# Patient Record
Sex: MALE | Race: WHITE | URBAN - METROPOLITAN AREA
[De-identification: names, ages, dates, MRNs, and addresses within clinical notes are randomized per-mention and may not be internally consistent; named-entity substitution may affect disease eponyms.]

---

## 2017-01-23 ENCOUNTER — APPOINTMENT (RX ONLY)
Dept: URBAN - METROPOLITAN AREA CLINIC 141 | Facility: CLINIC | Age: 72
Setting detail: DERMATOLOGY
End: 2017-01-23

## 2017-01-23 DIAGNOSIS — D18.0 HEMANGIOMA: ICD-10-CM

## 2017-01-23 DIAGNOSIS — Z85.820 PERSONAL HISTORY OF MALIGNANT MELANOMA OF SKIN: ICD-10-CM

## 2017-01-23 DIAGNOSIS — L21.8 OTHER SEBORRHEIC DERMATITIS: ICD-10-CM

## 2017-01-23 DIAGNOSIS — L81.4 OTHER MELANIN HYPERPIGMENTATION: ICD-10-CM

## 2017-01-23 DIAGNOSIS — D22 MELANOCYTIC NEVI: ICD-10-CM

## 2017-01-23 DIAGNOSIS — L82.1 OTHER SEBORRHEIC KERATOSIS: ICD-10-CM

## 2017-01-23 PROBLEM — L85.3 XEROSIS CUTIS: Status: ACTIVE | Noted: 2017-01-23

## 2017-01-23 PROBLEM — J44.9 CHRONIC OBSTRUCTIVE PULMONARY DISEASE, UNSPECIFIED: Status: ACTIVE | Noted: 2017-01-23

## 2017-01-23 PROBLEM — D18.01 HEMANGIOMA OF SKIN AND SUBCUTANEOUS TISSUE: Status: ACTIVE | Noted: 2017-01-23

## 2017-01-23 PROBLEM — I10 ESSENTIAL (PRIMARY) HYPERTENSION: Status: ACTIVE | Noted: 2017-01-23

## 2017-01-23 PROBLEM — L57.0 ACTINIC KERATOSIS: Status: ACTIVE | Noted: 2017-01-23

## 2017-01-23 PROBLEM — E78.5 HYPERLIPIDEMIA, UNSPECIFIED: Status: ACTIVE | Noted: 2017-01-23

## 2017-01-23 PROBLEM — D22.61 MELANOCYTIC NEVI OF RIGHT UPPER LIMB, INCLUDING SHOULDER: Status: ACTIVE | Noted: 2017-01-23

## 2017-01-23 PROCEDURE — ? COUNSELING

## 2017-01-23 PROCEDURE — ? PRESCRIPTION

## 2017-01-23 PROCEDURE — 99203 OFFICE O/P NEW LOW 30 MIN: CPT

## 2017-01-23 RX ORDER — KETOCONAZOLE 20.5 MG/ML
SHAMPOO, SUSPENSION TOPICAL
Qty: 1 | Refills: 5 | Status: ERX

## 2017-01-23 ASSESSMENT — LOCATION ZONE DERM
LOCATION ZONE: SCALP
LOCATION ZONE: ARM
LOCATION ZONE: FACE
LOCATION ZONE: TRUNK

## 2017-01-23 ASSESSMENT — LOCATION SIMPLE DESCRIPTION DERM
LOCATION SIMPLE: LEFT CLAVICULAR SKIN
LOCATION SIMPLE: RIGHT SHOULDER
LOCATION SIMPLE: RIGHT FOREARM
LOCATION SIMPLE: ABDOMEN
LOCATION SIMPLE: LEFT SCALP
LOCATION SIMPLE: LEFT FOREHEAD

## 2017-01-23 ASSESSMENT — LOCATION DETAILED DESCRIPTION DERM
LOCATION DETAILED: RIGHT POSTERIOR SHOULDER
LOCATION DETAILED: LEFT CLAVICULAR SKIN
LOCATION DETAILED: LEFT MEDIAL FRONTAL SCALP
LOCATION DETAILED: LEFT RIB CAGE
LOCATION DETAILED: RIGHT DISTAL DORSAL FOREARM
LOCATION DETAILED: LEFT SUPERIOR MEDIAL FOREHEAD

## 2017-01-23 NOTE — HPI: EVALUATION OF SKIN LESION(S)
How Severe Are Your Spot(S)?: mild
Have Your Spot(S) Been Treated In The Past?: has not been treated
Hpi Title: Evaluation of Skin Lesions
Location: right forearm
Year Removed: 2013

## 2017-05-01 ENCOUNTER — HOSPITAL ENCOUNTER (OUTPATIENT)
Dept: HOSPITAL 35 - PAIN | Age: 72
Discharge: HOME | End: 2017-05-01
Attending: ANESTHESIOLOGY
Payer: COMMERCIAL

## 2017-05-01 VITALS — WEIGHT: 215.6 LBS | HEIGHT: 64.02 IN | BODY MASS INDEX: 36.81 KG/M2

## 2017-05-01 VITALS — DIASTOLIC BLOOD PRESSURE: 59 MMHG | SYSTOLIC BLOOD PRESSURE: 115 MMHG

## 2017-05-01 DIAGNOSIS — H35.30: ICD-10-CM

## 2017-05-01 DIAGNOSIS — M47.896: ICD-10-CM

## 2017-05-01 DIAGNOSIS — M54.9: Primary | ICD-10-CM

## 2017-05-01 DIAGNOSIS — E11.9: ICD-10-CM

## 2017-05-01 DIAGNOSIS — Z87.311: ICD-10-CM

## 2017-05-01 DIAGNOSIS — J44.9: ICD-10-CM

## 2017-05-01 DIAGNOSIS — Z87.891: ICD-10-CM

## 2017-05-01 DIAGNOSIS — G89.29: ICD-10-CM

## 2017-05-01 DIAGNOSIS — M81.0: ICD-10-CM

## 2017-05-01 DIAGNOSIS — J45.909: ICD-10-CM

## 2017-05-25 ENCOUNTER — APPOINTMENT (RX ONLY)
Dept: URBAN - METROPOLITAN AREA CLINIC 141 | Facility: CLINIC | Age: 72
Setting detail: DERMATOLOGY
End: 2017-05-25

## 2017-05-25 DIAGNOSIS — L57.0 ACTINIC KERATOSIS: ICD-10-CM

## 2017-05-25 DIAGNOSIS — L82.1 OTHER SEBORRHEIC KERATOSIS: ICD-10-CM

## 2017-05-25 DIAGNOSIS — Z85.820 PERSONAL HISTORY OF MALIGNANT MELANOMA OF SKIN: ICD-10-CM

## 2017-05-25 PROCEDURE — 99213 OFFICE O/P EST LOW 20 MIN: CPT

## 2017-05-25 PROCEDURE — ? COUNSELING

## 2017-05-25 ASSESSMENT — LOCATION SIMPLE DESCRIPTION DERM
LOCATION SIMPLE: RIGHT FOREARM
LOCATION SIMPLE: LEFT EAR
LOCATION SIMPLE: LEFT FOREARM

## 2017-05-25 ASSESSMENT — LOCATION DETAILED DESCRIPTION DERM
LOCATION DETAILED: RIGHT DISTAL DORSAL FOREARM
LOCATION DETAILED: RIGHT PROXIMAL DORSAL FOREARM
LOCATION DETAILED: LEFT PROXIMAL DORSAL FOREARM
LOCATION DETAILED: LEFT SUPERIOR CRUS OF ANTIHELIX

## 2017-05-25 ASSESSMENT — LOCATION ZONE DERM
LOCATION ZONE: ARM
LOCATION ZONE: EAR

## 2017-05-25 NOTE — HPI: EVALUATION OF SKIN LESION(S)
How Severe Are Your Spot(S)?: mild
Have Your Spot(S) Been Treated In The Past?: has not been treated
Hpi Title: Evaluation of Skin Lesions
Location: right forearm
Year Removed: 2011

## 2017-06-13 ENCOUNTER — HOSPITAL ENCOUNTER (INPATIENT)
Dept: HOSPITAL 35 - ER | Age: 72
LOS: 5 days | Discharge: HOME HEALTH | DRG: 871 | End: 2017-06-18
Attending: FAMILY MEDICINE | Admitting: FAMILY MEDICINE
Payer: COMMERCIAL

## 2017-06-13 VITALS — BODY MASS INDEX: 37.63 KG/M2 | HEIGHT: 64.02 IN | WEIGHT: 220.4 LBS

## 2017-06-13 VITALS — SYSTOLIC BLOOD PRESSURE: 105 MMHG | DIASTOLIC BLOOD PRESSURE: 66 MMHG

## 2017-06-13 VITALS — DIASTOLIC BLOOD PRESSURE: 52 MMHG | SYSTOLIC BLOOD PRESSURE: 78 MMHG

## 2017-06-13 VITALS — DIASTOLIC BLOOD PRESSURE: 70 MMHG | SYSTOLIC BLOOD PRESSURE: 116 MMHG

## 2017-06-13 VITALS — DIASTOLIC BLOOD PRESSURE: 67 MMHG | SYSTOLIC BLOOD PRESSURE: 127 MMHG

## 2017-06-13 VITALS — DIASTOLIC BLOOD PRESSURE: 67 MMHG | SYSTOLIC BLOOD PRESSURE: 118 MMHG

## 2017-06-13 VITALS — SYSTOLIC BLOOD PRESSURE: 123 MMHG | DIASTOLIC BLOOD PRESSURE: 66 MMHG

## 2017-06-13 VITALS — DIASTOLIC BLOOD PRESSURE: 57 MMHG | SYSTOLIC BLOOD PRESSURE: 102 MMHG

## 2017-06-13 VITALS — DIASTOLIC BLOOD PRESSURE: 69 MMHG | SYSTOLIC BLOOD PRESSURE: 151 MMHG

## 2017-06-13 DIAGNOSIS — F32.9: ICD-10-CM

## 2017-06-13 DIAGNOSIS — J96.01: ICD-10-CM

## 2017-06-13 DIAGNOSIS — Z87.891: ICD-10-CM

## 2017-06-13 DIAGNOSIS — J44.0: ICD-10-CM

## 2017-06-13 DIAGNOSIS — Z98.52: ICD-10-CM

## 2017-06-13 DIAGNOSIS — M48.00: ICD-10-CM

## 2017-06-13 DIAGNOSIS — Z88.6: ICD-10-CM

## 2017-06-13 DIAGNOSIS — Z79.899: ICD-10-CM

## 2017-06-13 DIAGNOSIS — Z88.8: ICD-10-CM

## 2017-06-13 DIAGNOSIS — R65.21: ICD-10-CM

## 2017-06-13 DIAGNOSIS — I12.9: ICD-10-CM

## 2017-06-13 DIAGNOSIS — G89.29: ICD-10-CM

## 2017-06-13 DIAGNOSIS — Z93.3: ICD-10-CM

## 2017-06-13 DIAGNOSIS — A41.9: Primary | ICD-10-CM

## 2017-06-13 DIAGNOSIS — E78.5: ICD-10-CM

## 2017-06-13 DIAGNOSIS — E11.22: ICD-10-CM

## 2017-06-13 DIAGNOSIS — M81.0: ICD-10-CM

## 2017-06-13 DIAGNOSIS — N17.9: ICD-10-CM

## 2017-06-13 DIAGNOSIS — N18.9: ICD-10-CM

## 2017-06-13 DIAGNOSIS — J18.9: ICD-10-CM

## 2017-06-13 DIAGNOSIS — K57.90: ICD-10-CM

## 2017-06-13 DIAGNOSIS — Z87.442: ICD-10-CM

## 2017-06-13 DIAGNOSIS — J44.1: ICD-10-CM

## 2017-06-13 DIAGNOSIS — I25.10: ICD-10-CM

## 2017-06-13 DIAGNOSIS — Z88.1: ICD-10-CM

## 2017-06-13 DIAGNOSIS — Z96.659: ICD-10-CM

## 2017-06-13 DIAGNOSIS — M19.90: ICD-10-CM

## 2017-06-13 LAB
ABG SAMPLE TYPE: (no result)
ALBUMIN SERPL-MCNC: 3.5 G/DL (ref 3.4–5)
ALP SERPL-CCNC: 88 U/L (ref 46–116)
ALT SERPL-CCNC: 30 U/L (ref 30–65)
ANION GAP SERPL CALC-SCNC: 11 MMOL/L (ref 7–16)
AST SERPL-CCNC: 13 U/L (ref 15–37)
BASOPHILS NFR BLD AUTO: 0.7 % (ref 0–2)
BE(VIVO): -1.3 MMOL/L
BILIRUB DIRECT SERPL-MCNC: 0.1 MG/DL
BILIRUB SERPL-MCNC: 0.5 MG/DL
BILIRUB UR-MCNC: NEGATIVE MG/DL
BUN SERPL-MCNC: 40 MG/DL (ref 7–18)
CALCIUM SERPL-MCNC: 8.8 MG/DL (ref 8.5–10.1)
CHLORIDE SERPL-SCNC: 100 MMOL/L (ref 98–107)
CO2 SERPL-SCNC: 25 MMOL/L (ref 21–32)
COLOR UR: YELLOW
CREAT SERPL-MCNC: 2.7 MG/DL (ref 0.7–1.3)
EOSINOPHIL NFR BLD: 1.3 % (ref 0–3)
ERYTHROCYTE [DISTWIDTH] IN BLOOD BY AUTOMATED COUNT: 15.7 % (ref 10.5–14.5)
GLUCOSE SERPL-MCNC: 179 MG/DL (ref 74–106)
GRANULOCYTES NFR BLD MANUAL: 78.4 % (ref 36–66)
HCO3 BLD-SCNC: 23.3 MMOL/L (ref 22–26)
HCT VFR BLD CALC: 34.6 % (ref 42–52)
HGB BLD-MCNC: 11.4 GM/DL (ref 14–18)
KETONES UR STRIP-MCNC: NEGATIVE MG/DL
LACTATE SERPL-SCNC: 3.51 MMOL/L (ref 0.5–2)
LYMPHOCYTES NFR BLD AUTO: 10.5 % (ref 24–44)
MANUAL DIFFERENTIAL PERFORMED BLD QL: NO
MCH RBC QN AUTO: 29.1 PG (ref 26–34)
MCHC RBC AUTO-ENTMCNC: 32.9 G/DL (ref 28–37)
MCV RBC: 88.3 FL (ref 80–100)
MONOCYTES NFR BLD: 9.1 % (ref 1–8)
NEUTROPHILS # BLD: 12.9 THOU/UL (ref 1.4–8.2)
NITRITE UR QL STRIP: NEGATIVE
O2(CT): 16.3 ML/DL (ref 15–23)
PCO2 BLD: 38.8 MMHG (ref 35–45)
PLATELET # BLD: 196 THOU/UL (ref 150–400)
PO2 BLD: 89.3 MMHG (ref 80–100)
POTASSIUM SERPL-SCNC: 4 MMOL/L (ref 3.5–5.1)
PROT SERPL-MCNC: 6.7 G/DL (ref 6.4–8.2)
RBC # BLD AUTO: 3.92 MIL/UL (ref 4.5–6)
RBC # UR STRIP: (no result) /UL
SODIUM SERPL-SCNC: 136 MMOL/L (ref 136–145)
SP GR UR STRIP: 1.02 (ref 1–1.03)
STICK SITE: (no result)
URINE GLUCOSE-RANDOM*: NEGATIVE
URINE PROTEIN (DIPSTICK): NEGATIVE
UROBILINOGEN UR STRIP-ACNC: 0.2 E.U./DL (ref 0.2–1)
WBC # BLD AUTO: 16.4 THOU/UL (ref 4–11)

## 2017-06-13 PROCEDURE — 10078: CPT

## 2017-06-13 PROCEDURE — 10047: CPT

## 2017-06-14 VITALS — DIASTOLIC BLOOD PRESSURE: 57 MMHG | SYSTOLIC BLOOD PRESSURE: 106 MMHG

## 2017-06-14 VITALS — DIASTOLIC BLOOD PRESSURE: 58 MMHG | SYSTOLIC BLOOD PRESSURE: 96 MMHG

## 2017-06-14 VITALS — DIASTOLIC BLOOD PRESSURE: 57 MMHG | SYSTOLIC BLOOD PRESSURE: 112 MMHG

## 2017-06-14 VITALS — DIASTOLIC BLOOD PRESSURE: 58 MMHG | SYSTOLIC BLOOD PRESSURE: 103 MMHG

## 2017-06-14 VITALS — DIASTOLIC BLOOD PRESSURE: 58 MMHG | SYSTOLIC BLOOD PRESSURE: 118 MMHG

## 2017-06-14 VITALS — DIASTOLIC BLOOD PRESSURE: 56 MMHG | SYSTOLIC BLOOD PRESSURE: 93 MMHG

## 2017-06-14 VITALS — DIASTOLIC BLOOD PRESSURE: 56 MMHG | SYSTOLIC BLOOD PRESSURE: 99 MMHG

## 2017-06-14 VITALS — SYSTOLIC BLOOD PRESSURE: 115 MMHG | DIASTOLIC BLOOD PRESSURE: 64 MMHG

## 2017-06-14 VITALS — SYSTOLIC BLOOD PRESSURE: 115 MMHG | DIASTOLIC BLOOD PRESSURE: 56 MMHG

## 2017-06-14 VITALS — SYSTOLIC BLOOD PRESSURE: 111 MMHG | DIASTOLIC BLOOD PRESSURE: 55 MMHG

## 2017-06-14 VITALS — SYSTOLIC BLOOD PRESSURE: 112 MMHG | DIASTOLIC BLOOD PRESSURE: 60 MMHG

## 2017-06-14 VITALS — DIASTOLIC BLOOD PRESSURE: 69 MMHG | SYSTOLIC BLOOD PRESSURE: 118 MMHG

## 2017-06-14 VITALS — SYSTOLIC BLOOD PRESSURE: 106 MMHG | DIASTOLIC BLOOD PRESSURE: 57 MMHG

## 2017-06-14 VITALS — SYSTOLIC BLOOD PRESSURE: 110 MMHG | DIASTOLIC BLOOD PRESSURE: 57 MMHG

## 2017-06-14 VITALS — DIASTOLIC BLOOD PRESSURE: 56 MMHG | SYSTOLIC BLOOD PRESSURE: 127 MMHG

## 2017-06-14 VITALS — DIASTOLIC BLOOD PRESSURE: 48 MMHG | SYSTOLIC BLOOD PRESSURE: 78 MMHG

## 2017-06-14 VITALS — SYSTOLIC BLOOD PRESSURE: 107 MMHG | DIASTOLIC BLOOD PRESSURE: 55 MMHG

## 2017-06-14 VITALS — SYSTOLIC BLOOD PRESSURE: 120 MMHG | DIASTOLIC BLOOD PRESSURE: 62 MMHG

## 2017-06-14 VITALS — DIASTOLIC BLOOD PRESSURE: 85 MMHG | SYSTOLIC BLOOD PRESSURE: 111 MMHG

## 2017-06-14 VITALS — DIASTOLIC BLOOD PRESSURE: 54 MMHG | SYSTOLIC BLOOD PRESSURE: 94 MMHG

## 2017-06-14 VITALS — SYSTOLIC BLOOD PRESSURE: 136 MMHG | DIASTOLIC BLOOD PRESSURE: 66 MMHG

## 2017-06-14 VITALS — DIASTOLIC BLOOD PRESSURE: 54 MMHG | SYSTOLIC BLOOD PRESSURE: 111 MMHG

## 2017-06-14 VITALS — DIASTOLIC BLOOD PRESSURE: 56 MMHG | SYSTOLIC BLOOD PRESSURE: 107 MMHG

## 2017-06-14 LAB
ANION GAP SERPL CALC-SCNC: 10 MMOL/L (ref 7–16)
BUN SERPL-MCNC: 36 MG/DL (ref 7–18)
CALCIUM SERPL-MCNC: 7.7 MG/DL (ref 8.5–10.1)
CHLORIDE SERPL-SCNC: 106 MMOL/L (ref 98–107)
CO2 SERPL-SCNC: 24 MMOL/L (ref 21–32)
CREAT SERPL-MCNC: 2.2 MG/DL (ref 0.7–1.3)
ERYTHROCYTE [DISTWIDTH] IN BLOOD BY AUTOMATED COUNT: 15.4 % (ref 10.5–14.5)
GLUCOSE SERPL-MCNC: 303 MG/DL (ref 74–106)
HCT VFR BLD CALC: 30.5 % (ref 42–52)
HGB BLD-MCNC: 10.2 GM/DL (ref 14–18)
MCH RBC QN AUTO: 29.8 PG (ref 26–34)
MCHC RBC AUTO-ENTMCNC: 33.5 G/DL (ref 28–37)
MCV RBC: 89.1 FL (ref 80–100)
PLATELET # BLD: 160 THOU/UL (ref 150–400)
POTASSIUM SERPL-SCNC: 5.2 MMOL/L (ref 3.5–5.1)
RBC # BLD AUTO: 3.42 MIL/UL (ref 4.5–6)
SODIUM SERPL-SCNC: 140 MMOL/L (ref 136–145)
WBC # BLD AUTO: 12 THOU/UL (ref 4–11)

## 2017-06-15 VITALS — DIASTOLIC BLOOD PRESSURE: 62 MMHG | SYSTOLIC BLOOD PRESSURE: 112 MMHG

## 2017-06-15 VITALS — DIASTOLIC BLOOD PRESSURE: 69 MMHG | SYSTOLIC BLOOD PRESSURE: 128 MMHG

## 2017-06-15 VITALS — SYSTOLIC BLOOD PRESSURE: 126 MMHG | DIASTOLIC BLOOD PRESSURE: 65 MMHG

## 2017-06-15 VITALS — DIASTOLIC BLOOD PRESSURE: 65 MMHG | SYSTOLIC BLOOD PRESSURE: 130 MMHG

## 2017-06-15 VITALS — SYSTOLIC BLOOD PRESSURE: 116 MMHG | DIASTOLIC BLOOD PRESSURE: 56 MMHG

## 2017-06-15 VITALS — DIASTOLIC BLOOD PRESSURE: 65 MMHG | SYSTOLIC BLOOD PRESSURE: 128 MMHG

## 2017-06-15 VITALS — DIASTOLIC BLOOD PRESSURE: 65 MMHG | SYSTOLIC BLOOD PRESSURE: 113 MMHG

## 2017-06-15 VITALS — DIASTOLIC BLOOD PRESSURE: 69 MMHG | SYSTOLIC BLOOD PRESSURE: 132 MMHG

## 2017-06-15 VITALS — SYSTOLIC BLOOD PRESSURE: 116 MMHG | DIASTOLIC BLOOD PRESSURE: 63 MMHG

## 2017-06-15 VITALS — SYSTOLIC BLOOD PRESSURE: 122 MMHG | DIASTOLIC BLOOD PRESSURE: 64 MMHG

## 2017-06-15 VITALS — SYSTOLIC BLOOD PRESSURE: 81 MMHG | DIASTOLIC BLOOD PRESSURE: 70 MMHG

## 2017-06-15 VITALS — SYSTOLIC BLOOD PRESSURE: 132 MMHG | DIASTOLIC BLOOD PRESSURE: 73 MMHG

## 2017-06-15 VITALS — SYSTOLIC BLOOD PRESSURE: 132 MMHG | DIASTOLIC BLOOD PRESSURE: 55 MMHG

## 2017-06-15 VITALS — SYSTOLIC BLOOD PRESSURE: 108 MMHG | DIASTOLIC BLOOD PRESSURE: 65 MMHG

## 2017-06-15 VITALS — DIASTOLIC BLOOD PRESSURE: 67 MMHG | SYSTOLIC BLOOD PRESSURE: 111 MMHG

## 2017-06-15 LAB
ANION GAP SERPL CALC-SCNC: 10 MMOL/L (ref 7–16)
BUN SERPL-MCNC: 28 MG/DL (ref 7–18)
CALCIUM SERPL-MCNC: 7.7 MG/DL (ref 8.5–10.1)
CHLORIDE SERPL-SCNC: 107 MMOL/L (ref 98–107)
CO2 SERPL-SCNC: 25 MMOL/L (ref 21–32)
CREAT SERPL-MCNC: 1.7 MG/DL (ref 0.7–1.3)
GLUCOSE SERPL-MCNC: 297 MG/DL (ref 74–106)
POTASSIUM SERPL-SCNC: 4.9 MMOL/L (ref 3.5–5.1)
SODIUM SERPL-SCNC: 142 MMOL/L (ref 136–145)

## 2017-06-16 VITALS — DIASTOLIC BLOOD PRESSURE: 71 MMHG | SYSTOLIC BLOOD PRESSURE: 123 MMHG

## 2017-06-16 VITALS — SYSTOLIC BLOOD PRESSURE: 116 MMHG | DIASTOLIC BLOOD PRESSURE: 58 MMHG

## 2017-06-16 VITALS — DIASTOLIC BLOOD PRESSURE: 62 MMHG | SYSTOLIC BLOOD PRESSURE: 105 MMHG

## 2017-06-16 VITALS — DIASTOLIC BLOOD PRESSURE: 55 MMHG | SYSTOLIC BLOOD PRESSURE: 121 MMHG

## 2017-06-16 VITALS — DIASTOLIC BLOOD PRESSURE: 58 MMHG | SYSTOLIC BLOOD PRESSURE: 116 MMHG

## 2017-06-17 VITALS — SYSTOLIC BLOOD PRESSURE: 129 MMHG | DIASTOLIC BLOOD PRESSURE: 65 MMHG

## 2017-06-17 VITALS — SYSTOLIC BLOOD PRESSURE: 128 MMHG | DIASTOLIC BLOOD PRESSURE: 76 MMHG

## 2017-06-17 VITALS — SYSTOLIC BLOOD PRESSURE: 149 MMHG | DIASTOLIC BLOOD PRESSURE: 75 MMHG

## 2017-06-17 VITALS — DIASTOLIC BLOOD PRESSURE: 73 MMHG | SYSTOLIC BLOOD PRESSURE: 127 MMHG

## 2017-06-17 VITALS — SYSTOLIC BLOOD PRESSURE: 144 MMHG | DIASTOLIC BLOOD PRESSURE: 76 MMHG

## 2017-06-17 LAB
ANION GAP SERPL CALC-SCNC: 8 MMOL/L (ref 7–16)
BUN SERPL-MCNC: 20 MG/DL (ref 7–18)
CALCIUM SERPL-MCNC: 7.5 MG/DL (ref 8.5–10.1)
CHLORIDE SERPL-SCNC: 111 MMOL/L (ref 98–107)
CO2 SERPL-SCNC: 26 MMOL/L (ref 21–32)
CREAT SERPL-MCNC: 1.3 MG/DL (ref 0.7–1.3)
ERYTHROCYTE [DISTWIDTH] IN BLOOD BY AUTOMATED COUNT: 15.9 % (ref 10.5–14.5)
GLUCOSE SERPL-MCNC: 204 MG/DL (ref 74–106)
HCT VFR BLD CALC: 27.2 % (ref 42–52)
HGB BLD-MCNC: 9.3 GM/DL (ref 14–18)
MCH RBC QN AUTO: 30.1 PG (ref 26–34)
MCHC RBC AUTO-ENTMCNC: 34.2 G/DL (ref 28–37)
MCV RBC: 88.2 FL (ref 80–100)
PLATELET # BLD: 164 THOU/UL (ref 150–400)
POTASSIUM SERPL-SCNC: 3.6 MMOL/L (ref 3.5–5.1)
RBC # BLD AUTO: 3.09 MIL/UL (ref 4.5–6)
SODIUM SERPL-SCNC: 145 MMOL/L (ref 136–145)
WBC # BLD AUTO: 8.5 THOU/UL (ref 4–11)

## 2017-06-18 VITALS — DIASTOLIC BLOOD PRESSURE: 52 MMHG | SYSTOLIC BLOOD PRESSURE: 122 MMHG

## 2017-06-18 VITALS — SYSTOLIC BLOOD PRESSURE: 116 MMHG | DIASTOLIC BLOOD PRESSURE: 58 MMHG

## 2017-06-18 VITALS — SYSTOLIC BLOOD PRESSURE: 121 MMHG | DIASTOLIC BLOOD PRESSURE: 69 MMHG

## 2017-06-27 ENCOUNTER — HOSPITAL ENCOUNTER (OUTPATIENT)
Dept: HOSPITAL 35 - RAD | Age: 72
End: 2017-06-27
Attending: FAMILY MEDICINE
Payer: COMMERCIAL

## 2017-06-27 DIAGNOSIS — R05: Primary | ICD-10-CM

## 2017-07-01 ENCOUNTER — HOSPITAL ENCOUNTER (OUTPATIENT)
Dept: HOSPITAL 35 - ER | Age: 72
Setting detail: OBSERVATION
LOS: 2 days | Discharge: HOME HEALTH | End: 2017-07-03
Attending: FAMILY MEDICINE | Admitting: FAMILY MEDICINE
Payer: COMMERCIAL

## 2017-07-01 VITALS — SYSTOLIC BLOOD PRESSURE: 118 MMHG | DIASTOLIC BLOOD PRESSURE: 54 MMHG

## 2017-07-01 VITALS — SYSTOLIC BLOOD PRESSURE: 122 MMHG | DIASTOLIC BLOOD PRESSURE: 45 MMHG

## 2017-07-01 VITALS — HEIGHT: 64.02 IN | BODY MASS INDEX: 36.7 KG/M2 | WEIGHT: 215 LBS

## 2017-07-01 DIAGNOSIS — I95.1: ICD-10-CM

## 2017-07-01 DIAGNOSIS — E78.5: ICD-10-CM

## 2017-07-01 DIAGNOSIS — I12.9: ICD-10-CM

## 2017-07-01 DIAGNOSIS — E11.22: ICD-10-CM

## 2017-07-01 DIAGNOSIS — N18.9: ICD-10-CM

## 2017-07-01 DIAGNOSIS — E86.0: Primary | ICD-10-CM

## 2017-07-01 DIAGNOSIS — M19.90: ICD-10-CM

## 2017-07-01 DIAGNOSIS — Z23: ICD-10-CM

## 2017-07-01 DIAGNOSIS — J44.9: ICD-10-CM

## 2017-07-01 DIAGNOSIS — N17.9: ICD-10-CM

## 2017-07-01 LAB
ANION GAP SERPL CALC-SCNC: 8 MMOL/L (ref 7–16)
BUN SERPL-MCNC: 36 MG/DL (ref 7–18)
CALCIUM SERPL-MCNC: 8.8 MG/DL (ref 8.5–10.1)
CHLORIDE SERPL-SCNC: 103 MMOL/L (ref 98–107)
CO2 SERPL-SCNC: 26 MMOL/L (ref 21–32)
CREAT SERPL-MCNC: 2.2 MG/DL (ref 0.7–1.3)
ERYTHROCYTE [DISTWIDTH] IN BLOOD BY AUTOMATED COUNT: 15.8 % (ref 10.5–14.5)
GLUCOSE SERPL-MCNC: 191 MG/DL (ref 74–106)
HCT VFR BLD CALC: 26.5 % (ref 42–52)
HGB BLD-MCNC: 9.1 GM/DL (ref 14–18)
MCH RBC QN AUTO: 30.2 PG (ref 26–34)
MCHC RBC AUTO-ENTMCNC: 34.3 G/DL (ref 28–37)
MCV RBC: 88.2 FL (ref 80–100)
PLATELET # BLD: 155 THOU/UL (ref 150–400)
POTASSIUM SERPL-SCNC: 4.5 MMOL/L (ref 3.5–5.1)
RBC # BLD AUTO: 3.01 MIL/UL (ref 4.5–6)
SODIUM SERPL-SCNC: 137 MMOL/L (ref 136–145)
TROPONIN I SERPL-MCNC: < 0.04 NG/ML
WBC # BLD AUTO: 10.1 THOU/UL (ref 4–11)

## 2017-07-02 VITALS — DIASTOLIC BLOOD PRESSURE: 56 MMHG | SYSTOLIC BLOOD PRESSURE: 100 MMHG

## 2017-07-02 VITALS — SYSTOLIC BLOOD PRESSURE: 90 MMHG | DIASTOLIC BLOOD PRESSURE: 51 MMHG

## 2017-07-02 VITALS — DIASTOLIC BLOOD PRESSURE: 50 MMHG | SYSTOLIC BLOOD PRESSURE: 90 MMHG

## 2017-07-02 VITALS — SYSTOLIC BLOOD PRESSURE: 113 MMHG | DIASTOLIC BLOOD PRESSURE: 57 MMHG

## 2017-07-02 VITALS — DIASTOLIC BLOOD PRESSURE: 65 MMHG | SYSTOLIC BLOOD PRESSURE: 130 MMHG

## 2017-07-03 VITALS — SYSTOLIC BLOOD PRESSURE: 102 MMHG | DIASTOLIC BLOOD PRESSURE: 50 MMHG

## 2017-07-03 VITALS — SYSTOLIC BLOOD PRESSURE: 127 MMHG | DIASTOLIC BLOOD PRESSURE: 78 MMHG

## 2017-07-03 VITALS — DIASTOLIC BLOOD PRESSURE: 70 MMHG | SYSTOLIC BLOOD PRESSURE: 119 MMHG

## 2017-07-03 VITALS — DIASTOLIC BLOOD PRESSURE: 51 MMHG | SYSTOLIC BLOOD PRESSURE: 97 MMHG

## 2017-07-03 LAB
ANION GAP SERPL CALC-SCNC: 6 MMOL/L (ref 7–16)
BUN SERPL-MCNC: 23 MG/DL (ref 7–18)
CALCIUM SERPL-MCNC: 7.6 MG/DL (ref 8.5–10.1)
CHLORIDE SERPL-SCNC: 107 MMOL/L (ref 98–107)
CO2 SERPL-SCNC: 28 MMOL/L (ref 21–32)
CREAT SERPL-MCNC: 1.7 MG/DL (ref 0.7–1.3)
ERYTHROCYTE [DISTWIDTH] IN BLOOD BY AUTOMATED COUNT: 16.5 % (ref 10.5–14.5)
GLUCOSE SERPL-MCNC: 130 MG/DL (ref 74–106)
HCT VFR BLD CALC: 25.2 % (ref 42–52)
HGB BLD-MCNC: 8.6 GM/DL (ref 14–18)
MCH RBC QN AUTO: 30.3 PG (ref 26–34)
MCHC RBC AUTO-ENTMCNC: 34.3 G/DL (ref 28–37)
MCV RBC: 88.3 FL (ref 80–100)
PLATELET # BLD: 158 THOU/UL (ref 150–400)
POTASSIUM SERPL-SCNC: 4.2 MMOL/L (ref 3.5–5.1)
RBC # BLD AUTO: 2.85 MIL/UL (ref 4.5–6)
SODIUM SERPL-SCNC: 141 MMOL/L (ref 136–145)
WBC # BLD AUTO: 7.3 THOU/UL (ref 4–11)

## 2017-08-17 ENCOUNTER — HOSPITAL ENCOUNTER (OUTPATIENT)
Dept: HOSPITAL 35 - PAIN | Age: 72
Discharge: HOME | End: 2017-08-17
Attending: ANESTHESIOLOGY
Payer: COMMERCIAL

## 2017-08-17 VITALS — DIASTOLIC BLOOD PRESSURE: 56 MMHG | SYSTOLIC BLOOD PRESSURE: 109 MMHG

## 2017-08-17 DIAGNOSIS — M54.9: ICD-10-CM

## 2017-08-17 DIAGNOSIS — G89.29: ICD-10-CM

## 2017-08-17 DIAGNOSIS — Z79.891: ICD-10-CM

## 2017-08-17 DIAGNOSIS — Z76.0: Primary | ICD-10-CM

## 2017-08-17 DIAGNOSIS — Z88.8: ICD-10-CM

## 2017-08-17 DIAGNOSIS — M47.896: ICD-10-CM

## 2017-08-17 DIAGNOSIS — M19.90: ICD-10-CM

## 2017-08-17 DIAGNOSIS — E11.319: ICD-10-CM

## 2017-08-17 DIAGNOSIS — J44.9: ICD-10-CM

## 2017-08-17 DIAGNOSIS — H35.30: ICD-10-CM

## 2017-09-26 ENCOUNTER — HOSPITAL ENCOUNTER (OUTPATIENT)
Dept: HOSPITAL 35 - RAD | Age: 72
End: 2017-09-26
Attending: FAMILY MEDICINE
Payer: COMMERCIAL

## 2017-09-26 DIAGNOSIS — J44.9: ICD-10-CM
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
## 2017-09-26 DIAGNOSIS — J18.9: ICD-10-CM

## 2017-09-26 DIAGNOSIS — S22.32XA: Primary | ICD-10-CM
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
## 2017-09-26 DIAGNOSIS — J98.11: ICD-10-CM
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
## 2017-09-30 ENCOUNTER — HOSPITAL ENCOUNTER (INPATIENT)
Dept: HOSPITAL 35 - ER | Age: 72
LOS: 4 days | Discharge: HOME HEALTH | DRG: 542 | End: 2017-10-04
Attending: FAMILY MEDICINE | Admitting: FAMILY MEDICINE
Payer: COMMERCIAL

## 2017-09-30 VITALS — SYSTOLIC BLOOD PRESSURE: 88 MMHG | DIASTOLIC BLOOD PRESSURE: 45 MMHG

## 2017-09-30 VITALS — SYSTOLIC BLOOD PRESSURE: 119 MMHG | DIASTOLIC BLOOD PRESSURE: 53 MMHG

## 2017-09-30 VITALS — DIASTOLIC BLOOD PRESSURE: 43 MMHG | SYSTOLIC BLOOD PRESSURE: 119 MMHG

## 2017-09-30 VITALS — DIASTOLIC BLOOD PRESSURE: 43 MMHG | SYSTOLIC BLOOD PRESSURE: 97 MMHG

## 2017-09-30 VITALS — HEIGHT: 62.99 IN | WEIGHT: 212 LBS | BODY MASS INDEX: 37.56 KG/M2

## 2017-09-30 VITALS — DIASTOLIC BLOOD PRESSURE: 47 MMHG | SYSTOLIC BLOOD PRESSURE: 122 MMHG

## 2017-09-30 VITALS — DIASTOLIC BLOOD PRESSURE: 61 MMHG | SYSTOLIC BLOOD PRESSURE: 132 MMHG

## 2017-09-30 DIAGNOSIS — Z88.8: ICD-10-CM

## 2017-09-30 DIAGNOSIS — M19.90: ICD-10-CM

## 2017-09-30 DIAGNOSIS — Z93.3: ICD-10-CM

## 2017-09-30 DIAGNOSIS — M80.08XA: Primary | ICD-10-CM

## 2017-09-30 DIAGNOSIS — Z85.828: ICD-10-CM

## 2017-09-30 DIAGNOSIS — F32.9: ICD-10-CM

## 2017-09-30 DIAGNOSIS — G89.29: ICD-10-CM

## 2017-09-30 DIAGNOSIS — Z88.5: ICD-10-CM

## 2017-09-30 DIAGNOSIS — Z87.442: ICD-10-CM

## 2017-09-30 DIAGNOSIS — Z88.1: ICD-10-CM

## 2017-09-30 DIAGNOSIS — H91.93: ICD-10-CM

## 2017-09-30 DIAGNOSIS — Z87.01: ICD-10-CM

## 2017-09-30 DIAGNOSIS — Z23: ICD-10-CM

## 2017-09-30 DIAGNOSIS — I10: ICD-10-CM

## 2017-09-30 DIAGNOSIS — Z98.52: ICD-10-CM

## 2017-09-30 DIAGNOSIS — J44.9: ICD-10-CM

## 2017-09-30 DIAGNOSIS — N17.0: ICD-10-CM

## 2017-09-30 DIAGNOSIS — Z79.52: ICD-10-CM

## 2017-09-30 DIAGNOSIS — E78.5: ICD-10-CM

## 2017-09-30 DIAGNOSIS — H54.8: ICD-10-CM
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
## 2017-09-30 DIAGNOSIS — T38.0X5A: ICD-10-CM

## 2017-09-30 DIAGNOSIS — E11.9: ICD-10-CM

## 2017-09-30 LAB
ANION GAP SERPL CALC-SCNC: 8 MMOL/L (ref 7–16)
ANISOCYTOSIS BLD QL SMEAR: (no result)
BASOPHILS NFR BLD AUTO: 1 % (ref 0–2)
BUN SERPL-MCNC: 38 MG/DL (ref 7–18)
CALCIUM SERPL-MCNC: 8.8 MG/DL (ref 8.5–10.1)
CHLORIDE SERPL-SCNC: 100 MMOL/L (ref 98–107)
CO2 SERPL-SCNC: 24 MMOL/L (ref 21–32)
CREAT SERPL-MCNC: 2.3 MG/DL (ref 0.7–1.3)
EOSINOPHIL NFR BLD: 8 % (ref 0–3)
ERYTHROCYTE [DISTWIDTH] IN BLOOD BY AUTOMATED COUNT: 15.4 % (ref 10.5–14.5)
GLUCOSE SERPL-MCNC: 103 MG/DL (ref 74–106)
GRANULOCYTES NFR BLD MANUAL: 63 % (ref 36–66)
HCT VFR BLD CALC: 31.3 % (ref 42–52)
HGB BLD-MCNC: 10.5 GM/DL (ref 14–18)
LYMPHOCYTES NFR BLD AUTO: 15 % (ref 24–44)
MANUAL DIFFERENTIAL PERFORMED BLD QL: YES
MCH RBC QN AUTO: 29.5 PG (ref 26–34)
MCHC RBC AUTO-ENTMCNC: 33.5 G/DL (ref 28–37)
MCV RBC: 88.2 FL (ref 80–100)
METAMYELOCYTES NFR BLD: 1 %
MONOCYTES NFR BLD: 11 % (ref 1–8)
NEUTROPHILS # BLD: 8.9 THOU/UL (ref 1.4–8.2)
NEUTS BAND NFR BLD: 1 % (ref 0–8)
PLATELET # BLD: 256 THOU/UL (ref 150–400)
POLYCHROMASIA BLD QL SMEAR: (no result)
POTASSIUM SERPL-SCNC: 4.7 MMOL/L (ref 3.5–5.1)
RBC # BLD AUTO: 3.55 MIL/UL (ref 4.5–6)
SODIUM SERPL-SCNC: 132 MMOL/L (ref 136–145)
TOTAL CELL COUNT: 100
TROPONIN I SERPL-MCNC: < 0.04 NG/ML
WBC # BLD AUTO: 13.9 THOU/UL (ref 4–11)

## 2017-09-30 PROCEDURE — 10790: CPT

## 2017-10-01 VITALS — SYSTOLIC BLOOD PRESSURE: 111 MMHG | DIASTOLIC BLOOD PRESSURE: 43 MMHG

## 2017-10-01 VITALS — DIASTOLIC BLOOD PRESSURE: 56 MMHG | SYSTOLIC BLOOD PRESSURE: 107 MMHG

## 2017-10-01 VITALS — DIASTOLIC BLOOD PRESSURE: 52 MMHG | SYSTOLIC BLOOD PRESSURE: 97 MMHG

## 2017-10-01 VITALS — SYSTOLIC BLOOD PRESSURE: 97 MMHG | DIASTOLIC BLOOD PRESSURE: 52 MMHG

## 2017-10-01 LAB
ANION GAP SERPL CALC-SCNC: 7 MMOL/L (ref 7–16)
ANISOCYTOSIS BLD QL SMEAR: SLIGHT
BUN SERPL-MCNC: 38 MG/DL (ref 7–18)
CALCIUM SERPL-MCNC: 8.6 MG/DL (ref 8.5–10.1)
CHLORIDE SERPL-SCNC: 103 MMOL/L (ref 98–107)
CO2 SERPL-SCNC: 26 MMOL/L (ref 21–32)
CREAT SERPL-MCNC: 2.1 MG/DL (ref 0.7–1.3)
EOSINOPHIL NFR BLD: 6 % (ref 0–3)
ERYTHROCYTE [DISTWIDTH] IN BLOOD BY AUTOMATED COUNT: 14.9 % (ref 10.5–14.5)
GLUCOSE SERPL-MCNC: 123 MG/DL (ref 74–106)
GRANULOCYTES NFR BLD MANUAL: 75 % (ref 36–66)
HCT VFR BLD CALC: 28.5 % (ref 42–52)
HGB BLD-MCNC: 9.5 GM/DL (ref 14–18)
LYMPHOCYTES NFR BLD AUTO: 8 % (ref 24–44)
MANUAL DIFFERENTIAL PERFORMED BLD QL: YES
MCH RBC QN AUTO: 29.2 PG (ref 26–34)
MCHC RBC AUTO-ENTMCNC: 33.3 G/DL (ref 28–37)
MCV RBC: 87.6 FL (ref 80–100)
MONOCYTES NFR BLD: 11 % (ref 1–8)
NEUTROPHILS # BLD: 8.5 THOU/UL (ref 1.4–8.2)
PLATELET # BLD: 227 THOU/UL (ref 150–400)
POTASSIUM SERPL-SCNC: 4.7 MMOL/L (ref 3.5–5.1)
RBC # BLD AUTO: 3.25 MIL/UL (ref 4.5–6)
SODIUM SERPL-SCNC: 136 MMOL/L (ref 136–145)
TOTAL CELL COUNT: 100
WBC # BLD AUTO: 11.3 THOU/UL (ref 4–11)

## 2017-10-02 VITALS — SYSTOLIC BLOOD PRESSURE: 125 MMHG | DIASTOLIC BLOOD PRESSURE: 47 MMHG

## 2017-10-02 VITALS — SYSTOLIC BLOOD PRESSURE: 120 MMHG | DIASTOLIC BLOOD PRESSURE: 62 MMHG

## 2017-10-02 VITALS — SYSTOLIC BLOOD PRESSURE: 163 MMHG | DIASTOLIC BLOOD PRESSURE: 75 MMHG

## 2017-10-02 VITALS — DIASTOLIC BLOOD PRESSURE: 60 MMHG | SYSTOLIC BLOOD PRESSURE: 114 MMHG

## 2017-10-02 VITALS — DIASTOLIC BLOOD PRESSURE: 55 MMHG | SYSTOLIC BLOOD PRESSURE: 121 MMHG

## 2017-10-02 LAB
ANION GAP SERPL CALC-SCNC: 5 MMOL/L (ref 7–16)
ANISOCYTOSIS BLD QL SMEAR: (no result)
BASOPHILS NFR BLD AUTO: 0 % (ref 0–2)
BUN SERPL-MCNC: 34 MG/DL (ref 7–18)
CALCIUM SERPL-MCNC: 8.7 MG/DL (ref 8.5–10.1)
CHLORIDE SERPL-SCNC: 103 MMOL/L (ref 98–107)
CO2 SERPL-SCNC: 29 MMOL/L (ref 21–32)
CREAT SERPL-MCNC: 1.6 MG/DL (ref 0.7–1.3)
EOSINOPHIL NFR BLD: 5 % (ref 0–3)
ERYTHROCYTE [DISTWIDTH] IN BLOOD BY AUTOMATED COUNT: 14.8 % (ref 10.5–14.5)
GLUCOSE SERPL-MCNC: 133 MG/DL (ref 74–106)
GRANULOCYTES NFR BLD MANUAL: 70 % (ref 36–66)
HCT VFR BLD CALC: 27.5 % (ref 42–52)
HGB BLD-MCNC: 9.4 GM/DL (ref 14–18)
LYMPHOCYTES NFR BLD AUTO: 10 % (ref 24–44)
MANUAL DIFFERENTIAL PERFORMED BLD QL: YES
MCH RBC QN AUTO: 29.7 PG (ref 26–34)
MCHC RBC AUTO-ENTMCNC: 34.2 G/DL (ref 28–37)
MCV RBC: 86.8 FL (ref 80–100)
METAMYELOCYTES NFR BLD: 2 %
MONOCYTES NFR BLD: 13 % (ref 1–8)
NEUTROPHILS # BLD: 6.9 THOU/UL (ref 1.4–8.2)
NEUTS BAND NFR BLD: 0 % (ref 0–8)
OVALOCYTES BLD QL SMEAR: (no result)
PLATELET # BLD: 235 THOU/UL (ref 150–400)
POTASSIUM SERPL-SCNC: 4.4 MMOL/L (ref 3.5–5.1)
RBC # BLD AUTO: 3.17 MIL/UL (ref 4.5–6)
SODIUM SERPL-SCNC: 137 MMOL/L (ref 136–145)
TOTAL CELL COUNT: 100
WBC # BLD AUTO: 9.8 THOU/UL (ref 4–11)

## 2017-10-03 VITALS — DIASTOLIC BLOOD PRESSURE: 62 MMHG | SYSTOLIC BLOOD PRESSURE: 137 MMHG

## 2017-10-03 VITALS — DIASTOLIC BLOOD PRESSURE: 54 MMHG | SYSTOLIC BLOOD PRESSURE: 132 MMHG

## 2017-10-03 VITALS — SYSTOLIC BLOOD PRESSURE: 152 MMHG | DIASTOLIC BLOOD PRESSURE: 58 MMHG

## 2017-10-03 VITALS — SYSTOLIC BLOOD PRESSURE: 124 MMHG | DIASTOLIC BLOOD PRESSURE: 63 MMHG

## 2017-10-04 VITALS — DIASTOLIC BLOOD PRESSURE: 62 MMHG | SYSTOLIC BLOOD PRESSURE: 139 MMHG

## 2017-10-04 VITALS — DIASTOLIC BLOOD PRESSURE: 60 MMHG | SYSTOLIC BLOOD PRESSURE: 145 MMHG

## 2017-10-04 VITALS — SYSTOLIC BLOOD PRESSURE: 126 MMHG | DIASTOLIC BLOOD PRESSURE: 59 MMHG

## 2017-10-04 VITALS — SYSTOLIC BLOOD PRESSURE: 145 MMHG | DIASTOLIC BLOOD PRESSURE: 60 MMHG

## 2017-10-12 ENCOUNTER — HOSPITAL ENCOUNTER (INPATIENT)
Dept: HOSPITAL 35 - SPEC | Age: 72
LOS: 7 days | Discharge: NURSING, REHAB OR LONG-TERM CARE | DRG: 515 | End: 2017-10-19
Attending: FAMILY MEDICINE | Admitting: FAMILY MEDICINE
Payer: COMMERCIAL

## 2017-10-12 VITALS — DIASTOLIC BLOOD PRESSURE: 47 MMHG | SYSTOLIC BLOOD PRESSURE: 130 MMHG

## 2017-10-12 VITALS — WEIGHT: 208.01 LBS | BODY MASS INDEX: 35.51 KG/M2 | HEIGHT: 64 IN

## 2017-10-12 VITALS — DIASTOLIC BLOOD PRESSURE: 53 MMHG | SYSTOLIC BLOOD PRESSURE: 126 MMHG

## 2017-10-12 VITALS — DIASTOLIC BLOOD PRESSURE: 62 MMHG | SYSTOLIC BLOOD PRESSURE: 139 MMHG

## 2017-10-12 DIAGNOSIS — M81.0: ICD-10-CM

## 2017-10-12 DIAGNOSIS — Z96.659: ICD-10-CM

## 2017-10-12 DIAGNOSIS — E87.6: ICD-10-CM

## 2017-10-12 DIAGNOSIS — Z87.01: ICD-10-CM

## 2017-10-12 DIAGNOSIS — I13.0: ICD-10-CM

## 2017-10-12 DIAGNOSIS — J96.21: ICD-10-CM

## 2017-10-12 DIAGNOSIS — Z93.3: ICD-10-CM

## 2017-10-12 DIAGNOSIS — Z83.3: ICD-10-CM

## 2017-10-12 DIAGNOSIS — M19.90: ICD-10-CM

## 2017-10-12 DIAGNOSIS — Z88.8: ICD-10-CM

## 2017-10-12 DIAGNOSIS — Z85.828: ICD-10-CM

## 2017-10-12 DIAGNOSIS — Z84.1: ICD-10-CM

## 2017-10-12 DIAGNOSIS — F32.9: ICD-10-CM

## 2017-10-12 DIAGNOSIS — J96.22: ICD-10-CM

## 2017-10-12 DIAGNOSIS — I27.81: ICD-10-CM

## 2017-10-12 DIAGNOSIS — N17.9: ICD-10-CM

## 2017-10-12 DIAGNOSIS — D64.9: ICD-10-CM

## 2017-10-12 DIAGNOSIS — G89.29: ICD-10-CM

## 2017-10-12 DIAGNOSIS — Z79.899: ICD-10-CM

## 2017-10-12 DIAGNOSIS — N18.9: ICD-10-CM

## 2017-10-12 DIAGNOSIS — K21.9: ICD-10-CM

## 2017-10-12 DIAGNOSIS — Z87.442: ICD-10-CM

## 2017-10-12 DIAGNOSIS — Z88.6: ICD-10-CM

## 2017-10-12 DIAGNOSIS — I50.43: ICD-10-CM

## 2017-10-12 DIAGNOSIS — M48.54XA: Primary | ICD-10-CM

## 2017-10-12 DIAGNOSIS — Z97.4: ICD-10-CM

## 2017-10-12 DIAGNOSIS — E78.5: ICD-10-CM

## 2017-10-12 DIAGNOSIS — Z88.1: ICD-10-CM

## 2017-10-12 DIAGNOSIS — J44.9: ICD-10-CM

## 2017-10-12 DIAGNOSIS — E86.0: ICD-10-CM

## 2017-10-12 DIAGNOSIS — Z87.891: ICD-10-CM

## 2017-10-12 DIAGNOSIS — E11.22: ICD-10-CM

## 2017-10-12 DIAGNOSIS — H54.8: ICD-10-CM

## 2017-10-12 DIAGNOSIS — Z91.81: ICD-10-CM

## 2017-10-12 DIAGNOSIS — Z98.52: ICD-10-CM

## 2017-10-12 DIAGNOSIS — M10.9: ICD-10-CM

## 2017-10-12 DIAGNOSIS — J98.11: ICD-10-CM

## 2017-10-12 LAB
ABG SAMPLE TYPE: (no result)
ALBUMIN SERPL-MCNC: 3.2 G/DL (ref 3.4–5)
ALP SERPL-CCNC: 88 U/L (ref 46–116)
ALT SERPL-CCNC: 23 U/L (ref 30–65)
ANION GAP SERPL CALC-SCNC: 6 MMOL/L (ref 7–16)
ANION GAP SERPL CALC-SCNC: 8 MMOL/L (ref 7–16)
APTT BLD: 26.1 SECONDS (ref 24.5–32.8)
AST SERPL-CCNC: 21 U/L (ref 15–37)
BE(VIVO): 2.7 MMOL/L
BILIRUB SERPL-MCNC: 0.3 MG/DL
BUN SERPL-MCNC: 10 MG/DL (ref 7–18)
BUN SERPL-MCNC: 10 MG/DL (ref 7–18)
CALCIUM SERPL-MCNC: 8.8 MG/DL (ref 8.5–10.1)
CALCIUM SERPL-MCNC: 9.2 MG/DL (ref 8.5–10.1)
CHLORIDE SERPL-SCNC: 101 MMOL/L (ref 98–107)
CHLORIDE SERPL-SCNC: 102 MMOL/L (ref 98–107)
CO2 SERPL-SCNC: 26 MMOL/L (ref 21–32)
CO2 SERPL-SCNC: 30 MMOL/L (ref 21–32)
CREAT SERPL-MCNC: 1.4 MG/DL (ref 0.7–1.3)
CREAT SERPL-MCNC: 1.4 MG/DL (ref 0.7–1.3)
ERYTHROCYTE [DISTWIDTH] IN BLOOD BY AUTOMATED COUNT: 14.4 % (ref 10.5–14.5)
ERYTHROCYTE [DISTWIDTH] IN BLOOD BY AUTOMATED COUNT: 14.5 % (ref 10.5–14.5)
GLUCOSE SERPL-MCNC: 105 MG/DL (ref 74–106)
GLUCOSE SERPL-MCNC: 108 MG/DL (ref 74–106)
HCO3 BLD-SCNC: 29.3 MMOL/L (ref 22–26)
HCT VFR BLD CALC: 29 % (ref 42–52)
HCT VFR BLD CALC: 30.4 % (ref 42–52)
HGB BLD-MCNC: 10.2 GM/DL (ref 14–18)
HGB BLD-MCNC: 9.8 GM/DL (ref 14–18)
INR PPP: 1
LACTATE SERPL-SCNC: 1.32 MMOL/L (ref 0.5–2)
MCH RBC QN AUTO: 28.9 PG (ref 26–34)
MCH RBC QN AUTO: 29.3 PG (ref 26–34)
MCHC RBC AUTO-ENTMCNC: 33.4 G/DL (ref 28–37)
MCHC RBC AUTO-ENTMCNC: 33.8 G/DL (ref 28–37)
MCV RBC: 86.5 FL (ref 80–100)
MCV RBC: 86.7 FL (ref 80–100)
O2(CT): 14.9 ML/DL (ref 15–23)
PCO2 BLD: 54.9 MMHG (ref 35–45)
PLATELET # BLD: 265 THOU/UL (ref 150–400)
PLATELET # BLD: 275 THOU/UL (ref 150–400)
PO2 BLD: 66.8 MMHG (ref 80–100)
POTASSIUM SERPL-SCNC: 4.2 MMOL/L (ref 3.5–5.1)
POTASSIUM SERPL-SCNC: 4.4 MMOL/L (ref 3.5–5.1)
PROT SERPL-MCNC: 6.4 G/DL (ref 6.4–8.2)
PROTHROMBIN TIME: 10.2 SECONDS (ref 9.3–11.4)
RBC # BLD AUTO: 3.34 MIL/UL (ref 4.5–6)
RBC # BLD AUTO: 3.52 MIL/UL (ref 4.5–6)
SODIUM SERPL-SCNC: 136 MMOL/L (ref 136–145)
SODIUM SERPL-SCNC: 137 MMOL/L (ref 136–145)
STICK SITE: (no result)
WBC # BLD AUTO: 8.4 THOU/UL (ref 4–11)
WBC # BLD AUTO: 8.8 THOU/UL (ref 4–11)

## 2017-10-12 PROCEDURE — 62110: CPT

## 2017-10-12 PROCEDURE — 10102: CPT

## 2017-10-12 PROCEDURE — 70005: CPT

## 2017-10-13 VITALS — SYSTOLIC BLOOD PRESSURE: 119 MMHG | DIASTOLIC BLOOD PRESSURE: 56 MMHG

## 2017-10-13 VITALS — DIASTOLIC BLOOD PRESSURE: 85 MMHG | SYSTOLIC BLOOD PRESSURE: 147 MMHG

## 2017-10-13 VITALS — DIASTOLIC BLOOD PRESSURE: 56 MMHG | SYSTOLIC BLOOD PRESSURE: 119 MMHG

## 2017-10-13 VITALS — DIASTOLIC BLOOD PRESSURE: 40 MMHG | SYSTOLIC BLOOD PRESSURE: 90 MMHG

## 2017-10-13 VITALS — DIASTOLIC BLOOD PRESSURE: 50 MMHG | SYSTOLIC BLOOD PRESSURE: 114 MMHG

## 2017-10-13 LAB
ABG SAMPLE TYPE: (no result)
BE(VIVO): 2.8 MMOL/L
HCO3 BLD-SCNC: 29.5 MMOL/L (ref 22–26)
LACTATE SERPL-SCNC: 1.32 MMOL/L (ref 0.5–2)
O2(CT): 14.9 ML/DL (ref 15–23)
PCO2 BLD: 55.8 MMHG (ref 35–45)
PO2 BLD: 73.7 MMHG (ref 80–100)
STICK SITE: (no result)

## 2017-10-14 VITALS — DIASTOLIC BLOOD PRESSURE: 50 MMHG | SYSTOLIC BLOOD PRESSURE: 109 MMHG

## 2017-10-14 VITALS — SYSTOLIC BLOOD PRESSURE: 124 MMHG | DIASTOLIC BLOOD PRESSURE: 84 MMHG

## 2017-10-14 VITALS — SYSTOLIC BLOOD PRESSURE: 102 MMHG | DIASTOLIC BLOOD PRESSURE: 46 MMHG

## 2017-10-14 VITALS — SYSTOLIC BLOOD PRESSURE: 120 MMHG | DIASTOLIC BLOOD PRESSURE: 53 MMHG

## 2017-10-15 VITALS — SYSTOLIC BLOOD PRESSURE: 96 MMHG | DIASTOLIC BLOOD PRESSURE: 43 MMHG

## 2017-10-15 VITALS — DIASTOLIC BLOOD PRESSURE: 45 MMHG | SYSTOLIC BLOOD PRESSURE: 107 MMHG

## 2017-10-15 VITALS — SYSTOLIC BLOOD PRESSURE: 91 MMHG | DIASTOLIC BLOOD PRESSURE: 41 MMHG

## 2017-10-15 VITALS — SYSTOLIC BLOOD PRESSURE: 106 MMHG | DIASTOLIC BLOOD PRESSURE: 49 MMHG

## 2017-10-15 VITALS — DIASTOLIC BLOOD PRESSURE: 47 MMHG | SYSTOLIC BLOOD PRESSURE: 99 MMHG

## 2017-10-15 LAB
ANION GAP SERPL CALC-SCNC: 9 MMOL/L (ref 7–16)
BUN SERPL-MCNC: 27 MG/DL (ref 7–18)
CALCIUM SERPL-MCNC: 8.7 MG/DL (ref 8.5–10.1)
CHLORIDE SERPL-SCNC: 101 MMOL/L (ref 98–107)
CO2 SERPL-SCNC: 29 MMOL/L (ref 21–32)
CREAT SERPL-MCNC: 3.1 MG/DL (ref 0.7–1.3)
GLUCOSE SERPL-MCNC: 120 MG/DL (ref 74–106)
POTASSIUM SERPL-SCNC: 3.7 MMOL/L (ref 3.5–5.1)
SODIUM SERPL-SCNC: 139 MMOL/L (ref 136–145)

## 2017-10-16 VITALS — SYSTOLIC BLOOD PRESSURE: 123 MMHG | DIASTOLIC BLOOD PRESSURE: 47 MMHG

## 2017-10-16 VITALS — SYSTOLIC BLOOD PRESSURE: 118 MMHG | DIASTOLIC BLOOD PRESSURE: 61 MMHG

## 2017-10-16 VITALS — DIASTOLIC BLOOD PRESSURE: 60 MMHG | SYSTOLIC BLOOD PRESSURE: 114 MMHG

## 2017-10-16 VITALS — DIASTOLIC BLOOD PRESSURE: 90 MMHG | SYSTOLIC BLOOD PRESSURE: 136 MMHG

## 2017-10-16 VITALS — SYSTOLIC BLOOD PRESSURE: 136 MMHG | DIASTOLIC BLOOD PRESSURE: 90 MMHG

## 2017-10-16 LAB
ANION GAP SERPL CALC-SCNC: 8 MMOL/L (ref 7–16)
BUN SERPL-MCNC: 29 MG/DL (ref 7–18)
CALCIUM SERPL-MCNC: 7.8 MG/DL (ref 8.5–10.1)
CHLORIDE SERPL-SCNC: 100 MMOL/L (ref 98–107)
CO2 SERPL-SCNC: 31 MMOL/L (ref 21–32)
CREAT SERPL-MCNC: 3.3 MG/DL (ref 0.7–1.3)
GLUCOSE SERPL-MCNC: 99 MG/DL (ref 74–106)
POTASSIUM SERPL-SCNC: 3.7 MMOL/L (ref 3.5–5.1)
SODIUM SERPL-SCNC: 139 MMOL/L (ref 136–145)

## 2017-10-17 VITALS — DIASTOLIC BLOOD PRESSURE: 59 MMHG | SYSTOLIC BLOOD PRESSURE: 128 MMHG

## 2017-10-17 VITALS — SYSTOLIC BLOOD PRESSURE: 115 MMHG | DIASTOLIC BLOOD PRESSURE: 58 MMHG

## 2017-10-17 VITALS — SYSTOLIC BLOOD PRESSURE: 139 MMHG | DIASTOLIC BLOOD PRESSURE: 64 MMHG

## 2017-10-17 VITALS — SYSTOLIC BLOOD PRESSURE: 130 MMHG | DIASTOLIC BLOOD PRESSURE: 65 MMHG

## 2017-10-17 LAB
ALBUMIN SERPL-MCNC: 2.8 G/DL (ref 3.4–5)
ANION GAP SERPL CALC-SCNC: 9 MMOL/L (ref 7–16)
BUN SERPL-MCNC: 31 MG/DL (ref 7–18)
CALCIUM SERPL-MCNC: 7.9 MG/DL (ref 8.5–10.1)
CHLORIDE SERPL-SCNC: 101 MMOL/L (ref 98–107)
CO2 SERPL-SCNC: 29 MMOL/L (ref 21–32)
CREAT SERPL-MCNC: 2.3 MG/DL (ref 0.7–1.3)
GLUCOSE SERPL-MCNC: 106 MG/DL (ref 74–106)
PHOSPHATE SERPL-MCNC: 4.1 MG/DL (ref 2.5–4.9)
POTASSIUM SERPL-SCNC: 4.1 MMOL/L (ref 3.5–5.1)
SODIUM SERPL-SCNC: 139 MMOL/L (ref 136–145)

## 2017-10-18 VITALS — DIASTOLIC BLOOD PRESSURE: 71 MMHG | SYSTOLIC BLOOD PRESSURE: 143 MMHG

## 2017-10-18 VITALS — DIASTOLIC BLOOD PRESSURE: 76 MMHG | SYSTOLIC BLOOD PRESSURE: 149 MMHG

## 2017-10-18 VITALS — SYSTOLIC BLOOD PRESSURE: 122 MMHG | DIASTOLIC BLOOD PRESSURE: 66 MMHG

## 2017-10-18 VITALS — DIASTOLIC BLOOD PRESSURE: 66 MMHG | SYSTOLIC BLOOD PRESSURE: 122 MMHG

## 2017-10-18 VITALS — SYSTOLIC BLOOD PRESSURE: 110 MMHG | DIASTOLIC BLOOD PRESSURE: 49 MMHG

## 2017-10-18 LAB
ALBUMIN SERPL-MCNC: 2.9 G/DL (ref 3.4–5)
ANION GAP SERPL CALC-SCNC: 8 MMOL/L (ref 7–16)
BUN SERPL-MCNC: 26 MG/DL (ref 7–18)
CALCIUM SERPL-MCNC: 8.2 MG/DL (ref 8.5–10.1)
CHLORIDE SERPL-SCNC: 102 MMOL/L (ref 98–107)
CO2 SERPL-SCNC: 33 MMOL/L (ref 21–32)
CREAT SERPL-MCNC: 2 MG/DL (ref 0.7–1.3)
GLUCOSE SERPL-MCNC: 114 MG/DL (ref 74–106)
PHOSPHATE SERPL-MCNC: 3.2 MG/DL (ref 2.5–4.9)
POTASSIUM SERPL-SCNC: 3.4 MMOL/L (ref 3.5–5.1)
SODIUM SERPL-SCNC: 143 MMOL/L (ref 136–145)

## 2017-10-19 VITALS — SYSTOLIC BLOOD PRESSURE: 126 MMHG | DIASTOLIC BLOOD PRESSURE: 67 MMHG

## 2017-10-19 VITALS — DIASTOLIC BLOOD PRESSURE: 52 MMHG | SYSTOLIC BLOOD PRESSURE: 118 MMHG

## 2017-10-19 LAB
ALBUMIN SERPL-MCNC: 2.9 G/DL (ref 3.4–5)
ANION GAP SERPL CALC-SCNC: 4 MMOL/L (ref 7–16)
BUN SERPL-MCNC: 24 MG/DL (ref 7–18)
CALCIUM SERPL-MCNC: 8.7 MG/DL (ref 8.5–10.1)
CHLORIDE SERPL-SCNC: 105 MMOL/L (ref 98–107)
CO2 SERPL-SCNC: 35 MMOL/L (ref 21–32)
CREAT SERPL-MCNC: 1.6 MG/DL (ref 0.7–1.3)
GLUCOSE SERPL-MCNC: 169 MG/DL (ref 74–106)
PHOSPHATE SERPL-MCNC: 2.5 MG/DL (ref 2.5–4.9)
POTASSIUM SERPL-SCNC: 4.2 MMOL/L (ref 3.5–5.1)
SODIUM SERPL-SCNC: 144 MMOL/L (ref 136–145)

## 2017-10-25 ENCOUNTER — HOSPITAL ENCOUNTER (INPATIENT)
Dept: HOSPITAL 35 - ER | Age: 72
LOS: 6 days | Discharge: HOME HEALTH | DRG: 871 | End: 2017-10-31
Attending: FAMILY MEDICINE | Admitting: FAMILY MEDICINE
Payer: COMMERCIAL

## 2017-10-25 VITALS — HEIGHT: 64 IN | BODY MASS INDEX: 34.54 KG/M2 | WEIGHT: 202.3 LBS

## 2017-10-25 VITALS — SYSTOLIC BLOOD PRESSURE: 116 MMHG | DIASTOLIC BLOOD PRESSURE: 62 MMHG

## 2017-10-25 VITALS — DIASTOLIC BLOOD PRESSURE: 62 MMHG | SYSTOLIC BLOOD PRESSURE: 114 MMHG

## 2017-10-25 VITALS — SYSTOLIC BLOOD PRESSURE: 145 MMHG | DIASTOLIC BLOOD PRESSURE: 94 MMHG

## 2017-10-25 VITALS — DIASTOLIC BLOOD PRESSURE: 48 MMHG | SYSTOLIC BLOOD PRESSURE: 99 MMHG

## 2017-10-25 VITALS — SYSTOLIC BLOOD PRESSURE: 115 MMHG | DIASTOLIC BLOOD PRESSURE: 47 MMHG

## 2017-10-25 VITALS — SYSTOLIC BLOOD PRESSURE: 116 MMHG | DIASTOLIC BLOOD PRESSURE: 64 MMHG

## 2017-10-25 VITALS — DIASTOLIC BLOOD PRESSURE: 56 MMHG | SYSTOLIC BLOOD PRESSURE: 110 MMHG

## 2017-10-25 DIAGNOSIS — Z96.659: ICD-10-CM

## 2017-10-25 DIAGNOSIS — M81.0: ICD-10-CM

## 2017-10-25 DIAGNOSIS — Z93.3: ICD-10-CM

## 2017-10-25 DIAGNOSIS — M54.9: ICD-10-CM

## 2017-10-25 DIAGNOSIS — Z87.311: ICD-10-CM

## 2017-10-25 DIAGNOSIS — Z98.52: ICD-10-CM

## 2017-10-25 DIAGNOSIS — J44.1: ICD-10-CM

## 2017-10-25 DIAGNOSIS — I10: ICD-10-CM

## 2017-10-25 DIAGNOSIS — E11.9: ICD-10-CM

## 2017-10-25 DIAGNOSIS — K21.9: ICD-10-CM

## 2017-10-25 DIAGNOSIS — J18.9: ICD-10-CM

## 2017-10-25 DIAGNOSIS — J44.0: ICD-10-CM

## 2017-10-25 DIAGNOSIS — G47.33: ICD-10-CM

## 2017-10-25 DIAGNOSIS — H91.90: ICD-10-CM

## 2017-10-25 DIAGNOSIS — G89.29: ICD-10-CM

## 2017-10-25 DIAGNOSIS — Z88.1: ICD-10-CM

## 2017-10-25 DIAGNOSIS — E78.5: ICD-10-CM

## 2017-10-25 DIAGNOSIS — H54.8: ICD-10-CM

## 2017-10-25 DIAGNOSIS — J96.22: ICD-10-CM

## 2017-10-25 DIAGNOSIS — M19.90: ICD-10-CM

## 2017-10-25 DIAGNOSIS — Z87.442: ICD-10-CM

## 2017-10-25 DIAGNOSIS — Z88.6: ICD-10-CM

## 2017-10-25 DIAGNOSIS — J96.21: ICD-10-CM

## 2017-10-25 DIAGNOSIS — Z90.49: ICD-10-CM

## 2017-10-25 DIAGNOSIS — Z88.8: ICD-10-CM

## 2017-10-25 DIAGNOSIS — F32.9: ICD-10-CM

## 2017-10-25 DIAGNOSIS — Z85.828: ICD-10-CM

## 2017-10-25 DIAGNOSIS — E66.9: ICD-10-CM

## 2017-10-25 DIAGNOSIS — A41.9: Primary | ICD-10-CM

## 2017-10-25 LAB
ABG SAMPLE TYPE: (no result)
ANION GAP SERPL CALC-SCNC: 2 MMOL/L (ref 7–16)
BASOPHILS NFR BLD AUTO: 1 % (ref 0–2)
BE(VIVO): 7.8 MMOL/L
BUN SERPL-MCNC: 17 MG/DL (ref 7–18)
CALCIUM SERPL-MCNC: 9.1 MG/DL (ref 8.5–10.1)
CHLORIDE SERPL-SCNC: 100 MMOL/L (ref 98–107)
CO2 SERPL-SCNC: 37 MMOL/L (ref 21–32)
CREAT SERPL-MCNC: 1.9 MG/DL (ref 0.7–1.3)
EOSINOPHIL NFR BLD: 14 % (ref 0–3)
ERYTHROCYTE [DISTWIDTH] IN BLOOD BY AUTOMATED COUNT: 14.3 % (ref 10.5–14.5)
GLUCOSE SERPL-MCNC: 128 MG/DL (ref 74–106)
GRANULOCYTES NFR BLD MANUAL: 46 % (ref 36–66)
HCO3 BLD-SCNC: 35.6 MMOL/L (ref 22–26)
HCT VFR BLD CALC: 31 % (ref 42–52)
HGB BLD-MCNC: 10 GM/DL (ref 14–18)
LACTATE SERPL-SCNC: 1.01 MMOL/L (ref 0.5–2)
LYMPHOCYTES NFR BLD AUTO: 16 % (ref 24–44)
MANUAL DIFFERENTIAL PERFORMED BLD QL: YES
MCH RBC QN AUTO: 28 PG (ref 26–34)
MCHC RBC AUTO-ENTMCNC: 32.2 G/DL (ref 28–37)
MCV RBC: 87 FL (ref 80–100)
METAMYELOCYTES NFR BLD: 1 %
MONOCYTES NFR BLD: 20 % (ref 1–8)
NEUTROPHILS # BLD: 5.6 THOU/UL (ref 1.4–8.2)
NEUTS BAND NFR BLD: 2 % (ref 0–8)
O2(CT): 14.6 ML/DL (ref 15–23)
PCO2 BLD: 67.9 MMHG (ref 35–45)
PLATELET # BLD: 333 THOU/UL (ref 150–400)
PO2 BLD: 75.6 MMHG (ref 80–100)
POTASSIUM SERPL-SCNC: 4.4 MMOL/L (ref 3.5–5.1)
RBC # BLD AUTO: 3.56 MIL/UL (ref 4.5–6)
RBC MORPH BLD: NORMAL
SODIUM SERPL-SCNC: 139 MMOL/L (ref 136–145)
STICK SITE: (no result)
TOTAL CELL COUNT: 100
TROPONIN I SERPL-MCNC: < 0.04 NG/ML
WBC # BLD AUTO: 11.6 THOU/UL (ref 4–11)

## 2017-10-25 PROCEDURE — 5A09557 ASSISTANCE WITH RESPIRATORY VENTILATION, GREATER THAN 96 CONSECUTIVE HOURS, CONTINUOUS POSITIVE AIRWAY PRESSURE: ICD-10-PCS | Performed by: FAMILY MEDICINE

## 2017-10-25 PROCEDURE — 10081 I&D PILONIDAL CYST COMP: CPT

## 2017-10-26 VITALS — DIASTOLIC BLOOD PRESSURE: 62 MMHG | SYSTOLIC BLOOD PRESSURE: 127 MMHG

## 2017-10-26 VITALS — DIASTOLIC BLOOD PRESSURE: 68 MMHG | SYSTOLIC BLOOD PRESSURE: 108 MMHG

## 2017-10-26 VITALS — DIASTOLIC BLOOD PRESSURE: 52 MMHG | SYSTOLIC BLOOD PRESSURE: 116 MMHG

## 2017-10-26 VITALS — DIASTOLIC BLOOD PRESSURE: 63 MMHG | SYSTOLIC BLOOD PRESSURE: 97 MMHG

## 2017-10-26 VITALS — SYSTOLIC BLOOD PRESSURE: 104 MMHG | DIASTOLIC BLOOD PRESSURE: 57 MMHG

## 2017-10-27 VITALS — SYSTOLIC BLOOD PRESSURE: 114 MMHG | DIASTOLIC BLOOD PRESSURE: 55 MMHG

## 2017-10-27 VITALS — SYSTOLIC BLOOD PRESSURE: 126 MMHG | DIASTOLIC BLOOD PRESSURE: 71 MMHG

## 2017-10-27 VITALS — DIASTOLIC BLOOD PRESSURE: 56 MMHG | SYSTOLIC BLOOD PRESSURE: 111 MMHG

## 2017-10-27 VITALS — SYSTOLIC BLOOD PRESSURE: 100 MMHG | DIASTOLIC BLOOD PRESSURE: 54 MMHG

## 2017-10-27 VITALS — SYSTOLIC BLOOD PRESSURE: 122 MMHG | DIASTOLIC BLOOD PRESSURE: 59 MMHG

## 2017-10-27 LAB
ANION GAP SERPL CALC-SCNC: 7 MMOL/L (ref 7–16)
BUN SERPL-MCNC: 25 MG/DL (ref 7–18)
CALCIUM SERPL-MCNC: 9 MG/DL (ref 8.5–10.1)
CHLORIDE SERPL-SCNC: 98 MMOL/L (ref 98–107)
CO2 SERPL-SCNC: 36 MMOL/L (ref 21–32)
CREAT SERPL-MCNC: 2.1 MG/DL (ref 0.7–1.3)
ERYTHROCYTE [DISTWIDTH] IN BLOOD BY AUTOMATED COUNT: 14.6 % (ref 10.5–14.5)
GLUCOSE SERPL-MCNC: 155 MG/DL (ref 74–106)
HCT VFR BLD CALC: 29.6 % (ref 42–52)
HGB BLD-MCNC: 9.6 GM/DL (ref 14–18)
MAGNESIUM SERPL-MCNC: 1.7 MG/DL (ref 1.8–2.4)
MCH RBC QN AUTO: 27.7 PG (ref 26–34)
MCHC RBC AUTO-ENTMCNC: 32.5 G/DL (ref 28–37)
MCV RBC: 85.1 FL (ref 80–100)
PLATELET # BLD: 371 THOU/UL (ref 150–400)
POTASSIUM SERPL-SCNC: 4 MMOL/L (ref 3.5–5.1)
RBC # BLD AUTO: 3.47 MIL/UL (ref 4.5–6)
SODIUM SERPL-SCNC: 141 MMOL/L (ref 136–145)
WBC # BLD AUTO: 11.7 THOU/UL (ref 4–11)

## 2017-10-28 VITALS — SYSTOLIC BLOOD PRESSURE: 110 MMHG | DIASTOLIC BLOOD PRESSURE: 55 MMHG

## 2017-10-28 VITALS — DIASTOLIC BLOOD PRESSURE: 59 MMHG | SYSTOLIC BLOOD PRESSURE: 101 MMHG

## 2017-10-28 VITALS — DIASTOLIC BLOOD PRESSURE: 63 MMHG | SYSTOLIC BLOOD PRESSURE: 104 MMHG

## 2017-10-28 VITALS — SYSTOLIC BLOOD PRESSURE: 129 MMHG | DIASTOLIC BLOOD PRESSURE: 69 MMHG

## 2017-10-28 VITALS — SYSTOLIC BLOOD PRESSURE: 124 MMHG | DIASTOLIC BLOOD PRESSURE: 60 MMHG

## 2017-10-28 VITALS — SYSTOLIC BLOOD PRESSURE: 147 MMHG | DIASTOLIC BLOOD PRESSURE: 74 MMHG

## 2017-10-28 LAB
ABG SAMPLE TYPE: (no result)
BE(VIVO): 10.2 MMOL/L
HCO3 BLD-SCNC: 35.3 MMOL/L (ref 22–26)
LACTATE SERPL-SCNC: 1.91 MMOL/L (ref 0.5–2)
O2(CT): 15.1 ML/DL (ref 15–23)
O2/TOTAL GAS SETTING VFR VENT: 40 %
PCO2 BLD: 50 MMHG (ref 35–45)
PO2 BLD: 100.4 MMHG (ref 80–100)
STICK SITE: (no result)

## 2017-10-29 VITALS — SYSTOLIC BLOOD PRESSURE: 120 MMHG | DIASTOLIC BLOOD PRESSURE: 64 MMHG

## 2017-10-29 VITALS — SYSTOLIC BLOOD PRESSURE: 120 MMHG | DIASTOLIC BLOOD PRESSURE: 53 MMHG

## 2017-10-29 VITALS — DIASTOLIC BLOOD PRESSURE: 73 MMHG | SYSTOLIC BLOOD PRESSURE: 120 MMHG

## 2017-10-29 VITALS — DIASTOLIC BLOOD PRESSURE: 59 MMHG | SYSTOLIC BLOOD PRESSURE: 99 MMHG

## 2017-10-29 VITALS — DIASTOLIC BLOOD PRESSURE: 58 MMHG | SYSTOLIC BLOOD PRESSURE: 103 MMHG

## 2017-10-30 VITALS — SYSTOLIC BLOOD PRESSURE: 148 MMHG | DIASTOLIC BLOOD PRESSURE: 78 MMHG

## 2017-10-30 VITALS — DIASTOLIC BLOOD PRESSURE: 63 MMHG | SYSTOLIC BLOOD PRESSURE: 121 MMHG

## 2017-10-30 VITALS — SYSTOLIC BLOOD PRESSURE: 91 MMHG | DIASTOLIC BLOOD PRESSURE: 52 MMHG

## 2017-10-30 VITALS — SYSTOLIC BLOOD PRESSURE: 113 MMHG | DIASTOLIC BLOOD PRESSURE: 59 MMHG

## 2017-10-30 VITALS — SYSTOLIC BLOOD PRESSURE: 110 MMHG | DIASTOLIC BLOOD PRESSURE: 56 MMHG

## 2017-10-30 LAB
ABG SAMPLE TYPE: (no result)
ANION GAP SERPL CALC-SCNC: 6 MMOL/L (ref 7–16)
BE(VIVO): 6 MMOL/L
BUN SERPL-MCNC: 45 MG/DL (ref 7–18)
CALCIUM SERPL-MCNC: 8.7 MG/DL (ref 8.5–10.1)
CHLORIDE SERPL-SCNC: 105 MMOL/L (ref 98–107)
CO2 SERPL-SCNC: 32 MMOL/L (ref 21–32)
CREAT SERPL-MCNC: 2.2 MG/DL (ref 0.7–1.3)
ERYTHROCYTE [DISTWIDTH] IN BLOOD BY AUTOMATED COUNT: 15.3 % (ref 10.5–14.5)
GLUCOSE SERPL-MCNC: 154 MG/DL (ref 74–106)
HCO3 BLD-SCNC: 29.9 MMOL/L (ref 22–26)
HCT VFR BLD CALC: 30 % (ref 42–52)
HGB BLD-MCNC: 9.9 GM/DL (ref 14–18)
LACTATE SERPL-SCNC: 2.15 MMOL/L (ref 0.5–2)
MAGNESIUM SERPL-MCNC: 2.3 MG/DL (ref 1.8–2.4)
MCH RBC QN AUTO: 28.2 PG (ref 26–34)
MCHC RBC AUTO-ENTMCNC: 32.8 G/DL (ref 28–37)
MCV RBC: 85.9 FL (ref 80–100)
O2(CT): 14.8 ML/DL (ref 15–23)
O2/TOTAL GAS SETTING VFR VENT: 40 %
PCO2 BLD: 40.7 MMHG (ref 35–45)
PLATELET # BLD: 311 THOU/UL (ref 150–400)
PO2 BLD: 92 MMHG (ref 80–100)
POTASSIUM SERPL-SCNC: 3.6 MMOL/L (ref 3.5–5.1)
RBC # BLD AUTO: 3.49 MIL/UL (ref 4.5–6)
SODIUM SERPL-SCNC: 143 MMOL/L (ref 136–145)
STICK SITE: (no result)
WBC # BLD AUTO: 14.5 THOU/UL (ref 4–11)

## 2017-10-31 VITALS — SYSTOLIC BLOOD PRESSURE: 98 MMHG | DIASTOLIC BLOOD PRESSURE: 60 MMHG

## 2017-10-31 VITALS — DIASTOLIC BLOOD PRESSURE: 64 MMHG | SYSTOLIC BLOOD PRESSURE: 123 MMHG

## 2017-10-31 VITALS — SYSTOLIC BLOOD PRESSURE: 123 MMHG | DIASTOLIC BLOOD PRESSURE: 64 MMHG

## 2017-10-31 VITALS — DIASTOLIC BLOOD PRESSURE: 57 MMHG | SYSTOLIC BLOOD PRESSURE: 1052 MMHG

## 2017-10-31 VITALS — SYSTOLIC BLOOD PRESSURE: 116 MMHG | DIASTOLIC BLOOD PRESSURE: 67 MMHG

## 2017-12-14 ENCOUNTER — APPOINTMENT (RX ONLY)
Dept: URBAN - METROPOLITAN AREA CLINIC 141 | Facility: CLINIC | Age: 72
Setting detail: DERMATOLOGY
End: 2017-12-14

## 2017-12-14 DIAGNOSIS — L82.1 OTHER SEBORRHEIC KERATOSIS: ICD-10-CM

## 2017-12-14 DIAGNOSIS — L57.0 ACTINIC KERATOSIS: ICD-10-CM

## 2017-12-14 DIAGNOSIS — D22 MELANOCYTIC NEVI: ICD-10-CM

## 2017-12-14 DIAGNOSIS — L82.0 INFLAMED SEBORRHEIC KERATOSIS: ICD-10-CM

## 2017-12-14 DIAGNOSIS — Z85.820 PERSONAL HISTORY OF MALIGNANT MELANOMA OF SKIN: ICD-10-CM

## 2017-12-14 DIAGNOSIS — L81.4 OTHER MELANIN HYPERPIGMENTATION: ICD-10-CM

## 2017-12-14 PROBLEM — D22.5 MELANOCYTIC NEVI OF TRUNK: Status: ACTIVE | Noted: 2017-12-14

## 2017-12-14 PROCEDURE — ? LIQUID NITROGEN

## 2017-12-14 PROCEDURE — 17000 DESTRUCT PREMALG LESION: CPT | Mod: 59

## 2017-12-14 PROCEDURE — 17003 DESTRUCT PREMALG LES 2-14: CPT

## 2017-12-14 PROCEDURE — 99214 OFFICE O/P EST MOD 30 MIN: CPT | Mod: 25

## 2017-12-14 PROCEDURE — 17110 DESTRUCTION B9 LES UP TO 14: CPT

## 2017-12-14 PROCEDURE — ? COUNSELING

## 2017-12-14 ASSESSMENT — LOCATION DETAILED DESCRIPTION DERM
LOCATION DETAILED: LEFT SUPERIOR LATERAL FOREHEAD
LOCATION DETAILED: LEFT SUPERIOR FOREHEAD
LOCATION DETAILED: RIGHT SUPERIOR TEMPLE
LOCATION DETAILED: RIGHT POSTERIOR NECK
LOCATION DETAILED: LEFT CENTRAL FRONTAL SCALP
LOCATION DETAILED: RIGHT MEDIAL UPPER BACK
LOCATION DETAILED: RIGHT SUPERIOR LATERAL MALAR CHEEK
LOCATION DETAILED: SUPERIOR THORACIC SPINE
LOCATION DETAILED: RIGHT LATERAL FOREHEAD
LOCATION DETAILED: LEFT SUPERIOR ANTERIOR NECK
LOCATION DETAILED: SUPERIOR LUMBAR SPINE
LOCATION DETAILED: RIGHT VENTRAL DISTAL FOREARM
LOCATION DETAILED: LEFT SUPERIOR MEDIAL FOREHEAD

## 2017-12-14 ASSESSMENT — LOCATION SIMPLE DESCRIPTION DERM
LOCATION SIMPLE: LOWER BACK
LOCATION SIMPLE: POSTERIOR NECK
LOCATION SIMPLE: LEFT ANTERIOR NECK
LOCATION SIMPLE: LEFT FOREHEAD
LOCATION SIMPLE: UPPER BACK
LOCATION SIMPLE: RIGHT TEMPLE
LOCATION SIMPLE: RIGHT CHEEK
LOCATION SIMPLE: RIGHT FOREHEAD
LOCATION SIMPLE: RIGHT FOREARM
LOCATION SIMPLE: LEFT SCALP
LOCATION SIMPLE: RIGHT UPPER BACK

## 2017-12-14 ASSESSMENT — LOCATION ZONE DERM
LOCATION ZONE: ARM
LOCATION ZONE: FACE
LOCATION ZONE: SCALP
LOCATION ZONE: TRUNK
LOCATION ZONE: NECK

## 2017-12-14 ASSESSMENT — PAIN INTENSITY VAS: HOW INTENSE IS YOUR PAIN 0 BEING NO PAIN, 10 BEING THE MOST SEVERE PAIN POSSIBLE?: NO PAIN

## 2017-12-14 NOTE — PROCEDURE: LIQUID NITROGEN
Consent: The patient's consent was obtained including but not limited to risks of crusting, scabbing, blistering, scarring, darker or lighter pigmentary change, recurrence, incomplete removal and infection.
Number Of Freeze-Thaw Cycles: 2 freeze-thaw cycles
Render Post-Care Instructions In Note?: yes
Post-Care Instructions: I reviewed with the patient in detail post-care instructions. Patient is to wear sunprotection, and avoid picking at any of the treated lesions. Pt may apply Vaseline to crusted or scabbing areas.
Duration Of Freeze Thaw-Cycle (Seconds): 10
Detail Level: Detailed
Detail Level: Zone
Total Number Of Lesions Treated: 5
Medical Necessity Information: It is in your best interest to select a reason for this procedure from the list below. All of these items fulfill various CMS LCD requirements except the new and changing color options.
Include Z78.9 (Other Specified Conditions Influencing Health Status) As An Associated Diagnosis?: No
Medical Necessity Clause: This procedure was medically necessary because the lesions that were treated were:painful , bleeding on the patient face and worring the patient

## 2018-11-19 ENCOUNTER — APPOINTMENT (RX ONLY)
Dept: URBAN - METROPOLITAN AREA CLINIC 141 | Facility: CLINIC | Age: 73
Setting detail: DERMATOLOGY
End: 2018-11-19

## 2018-11-19 DIAGNOSIS — D18.0 HEMANGIOMA: ICD-10-CM

## 2018-11-19 DIAGNOSIS — L57.0 ACTINIC KERATOSIS: ICD-10-CM

## 2018-11-19 DIAGNOSIS — L82.0 INFLAMED SEBORRHEIC KERATOSIS: ICD-10-CM

## 2018-11-19 DIAGNOSIS — L81.4 OTHER MELANIN HYPERPIGMENTATION: ICD-10-CM

## 2018-11-19 DIAGNOSIS — Z85.820 PERSONAL HISTORY OF MALIGNANT MELANOMA OF SKIN: ICD-10-CM

## 2018-11-19 DIAGNOSIS — D22 MELANOCYTIC NEVI: ICD-10-CM

## 2018-11-19 DIAGNOSIS — L72.0 EPIDERMAL CYST: ICD-10-CM

## 2018-11-19 DIAGNOSIS — L82.1 OTHER SEBORRHEIC KERATOSIS: ICD-10-CM

## 2018-11-19 PROBLEM — D18.01 HEMANGIOMA OF SKIN AND SUBCUTANEOUS TISSUE: Status: ACTIVE | Noted: 2018-11-19

## 2018-11-19 PROBLEM — D22.5 MELANOCYTIC NEVI OF TRUNK: Status: ACTIVE | Noted: 2018-11-19

## 2018-11-19 PROCEDURE — 17000 DESTRUCT PREMALG LESION: CPT | Mod: 59

## 2018-11-19 PROCEDURE — 17110 DESTRUCTION B9 LES UP TO 14: CPT

## 2018-11-19 PROCEDURE — 99214 OFFICE O/P EST MOD 30 MIN: CPT | Mod: 25

## 2018-11-19 PROCEDURE — ? COUNSELING

## 2018-11-19 PROCEDURE — 17003 DESTRUCT PREMALG LES 2-14: CPT

## 2018-11-19 PROCEDURE — ? LIQUID NITROGEN

## 2018-11-19 ASSESSMENT — LOCATION SIMPLE DESCRIPTION DERM
LOCATION SIMPLE: RIGHT FOREARM
LOCATION SIMPLE: ABDOMEN
LOCATION SIMPLE: LEFT EAR
LOCATION SIMPLE: RIGHT ANTERIOR NECK
LOCATION SIMPLE: LEFT UPPER BACK
LOCATION SIMPLE: LEFT FOREARM
LOCATION SIMPLE: RIGHT SCALP
LOCATION SIMPLE: RIGHT CHEEK
LOCATION SIMPLE: LEFT CLAVICULAR SKIN

## 2018-11-19 ASSESSMENT — LOCATION ZONE DERM
LOCATION ZONE: NECK
LOCATION ZONE: ARM
LOCATION ZONE: EAR
LOCATION ZONE: TRUNK
LOCATION ZONE: FACE
LOCATION ZONE: SCALP

## 2018-11-19 ASSESSMENT — LOCATION DETAILED DESCRIPTION DERM
LOCATION DETAILED: RIGHT VENTRAL PROXIMAL FOREARM
LOCATION DETAILED: LEFT DISTAL DORSAL FOREARM
LOCATION DETAILED: LEFT CLAVICULAR SKIN
LOCATION DETAILED: LEFT MEDIAL UPPER BACK
LOCATION DETAILED: LEFT CRUS OF HELIX
LOCATION DETAILED: RIGHT CENTRAL FRONTAL SCALP
LOCATION DETAILED: PERIUMBILICAL SKIN
LOCATION DETAILED: RIGHT INFERIOR LATERAL NECK
LOCATION DETAILED: RIGHT INFERIOR CENTRAL MALAR CHEEK
LOCATION DETAILED: LEFT INFERIOR MEDIAL UPPER BACK

## 2018-11-19 ASSESSMENT — PAIN INTENSITY VAS: HOW INTENSE IS YOUR PAIN 0 BEING NO PAIN, 10 BEING THE MOST SEVERE PAIN POSSIBLE?: NO PAIN

## 2018-11-19 NOTE — PROCEDURE: LIQUID NITROGEN
Render Post-Care Instructions In Note?: yes
Duration Of Freeze Thaw-Cycle (Seconds): 10
Number Of Freeze-Thaw Cycles: 2 freeze-thaw cycles
Detail Level: Detailed
Post-Care Instructions: I reviewed with the patient in detail post-care instructions. Patient is to wear sunprotection, and avoid picking at any of the treated lesions. Pt may apply Vaseline to crusted or scabbing areas.
Consent: The patient's consent was obtained including but not limited to risks of crusting, scabbing, blistering, scarring, darker or lighter pigmentary change, recurrence, incomplete removal and infection.
Total Number Of Aks Treated: 2
Add 52 Modifier (Optional): no
Medical Necessity Information: It is in your best interest to select a reason for this procedure from the list below. All of these items fulfill various CMS LCD requirements except the new and changing color options.
Medical Necessity Clause: This procedure was medically necessary because the lesions that were treated were:
Detail Level: Simple

## 2019-01-01 ENCOUNTER — APPOINTMENT (RX ONLY)
Dept: URBAN - METROPOLITAN AREA CLINIC 39 | Facility: CLINIC | Age: 74
Setting detail: DERMATOLOGY
End: 2019-01-01

## 2019-01-01 DIAGNOSIS — L82.1 OTHER SEBORRHEIC KERATOSIS: ICD-10-CM

## 2019-01-01 DIAGNOSIS — Z85.820 PERSONAL HISTORY OF MALIGNANT MELANOMA OF SKIN: ICD-10-CM

## 2019-01-01 DIAGNOSIS — L57.0 ACTINIC KERATOSIS: ICD-10-CM

## 2019-01-01 PROCEDURE — 17003 DESTRUCT PREMALG LES 2-14: CPT

## 2019-01-01 PROCEDURE — ? COUNSELING

## 2019-01-01 PROCEDURE — 99214 OFFICE O/P EST MOD 30 MIN: CPT | Mod: 25

## 2019-01-01 PROCEDURE — ? LIQUID NITROGEN

## 2019-01-01 PROCEDURE — 17000 DESTRUCT PREMALG LESION: CPT

## 2019-01-01 ASSESSMENT — LOCATION SIMPLE DESCRIPTION DERM
LOCATION SIMPLE: LEFT SCALP
LOCATION SIMPLE: SUPERIOR FOREHEAD
LOCATION SIMPLE: LEFT CHEEK
LOCATION SIMPLE: LEFT PRETIBIAL REGION
LOCATION SIMPLE: RIGHT SCALP
LOCATION SIMPLE: LEFT TEMPLE
LOCATION SIMPLE: RIGHT CHEEK

## 2019-01-01 ASSESSMENT — LOCATION ZONE DERM
LOCATION ZONE: LEG
LOCATION ZONE: SCALP
LOCATION ZONE: FACE

## 2019-01-01 ASSESSMENT — PAIN INTENSITY VAS
HOW INTENSE IS YOUR PAIN 0 BEING NO PAIN, 10 BEING THE MOST SEVERE PAIN POSSIBLE?: 3/10 PAIN
HOW INTENSE IS YOUR PAIN 0 BEING NO PAIN, 10 BEING THE MOST SEVERE PAIN POSSIBLE?: NO PAIN

## 2019-01-01 ASSESSMENT — LOCATION DETAILED DESCRIPTION DERM
LOCATION DETAILED: LEFT INFERIOR CENTRAL MALAR CHEEK
LOCATION DETAILED: LEFT LATERAL TEMPLE
LOCATION DETAILED: RIGHT INFERIOR MEDIAL MALAR CHEEK
LOCATION DETAILED: RIGHT CENTRAL FRONTAL SCALP
LOCATION DETAILED: LEFT SUPERIOR LATERAL MALAR CHEEK
LOCATION DETAILED: LEFT PROXIMAL PRETIBIAL REGION
LOCATION DETAILED: SUPERIOR MID FOREHEAD
LOCATION DETAILED: LEFT CENTRAL FRONTAL SCALP
LOCATION DETAILED: RIGHT LATERAL FRONTAL SCALP

## 2019-02-26 NOTE — NUR
VSS REMAINS ST/BBB, -120 DEPENDING ON ACTIVITY. LUNGS DIMINISHED WITH
INTERMITTENT WHEEZES, PT BREATHING LABORED, O2 SAT HOME DOSE/4L IS 94% PT DOES
GET MILLER WITH MINIMAL EXERTION. PT UP TO CHAIR WITH PT WITH WALKER AND CONTACT
GUARD ASSIST, STEADY BUT INCREASE IN HR AND MILLER.WILL CONTINUE TO MONITER AND
CARE FOR PTPER PLANOF CARE

## 2019-02-26 NOTE — NUR
ASSESSMENTS AS CHARTED. PATIENT ARRIVED ON UNIT AFTER SHIFT CHANGE FROM THE
ED. PATIENT IS SHORT OF BREATH AND VERY WEAK. C/O CHRONIC PAIN REQUESTING PAIN
MEDS.  PATIENT ADMITTED INTO THE COMPUTER, SETTLED INTO BED. PLAN OF CARE TO
DIURESE, GIVE BREATHING TREATMENTS AND STEROIDS.

## 2019-02-26 NOTE — NUR
Nutrition: Pt seen due to high risk screen for poor intake, weight loss.
Admit with SOB x 1 week, COPD. UBW reported as 210#. Current 208# on lasix.
Follow trends post diuresis. Pt admits to eating TV dinners at home and
understands these are high sodium. Voices no questions on diet. Encouraged
smaller more frequent meals due to breathing difficulties. Follow for improved
intake of meals with diuresis. Consider low nutrition risk at this time.

## 2019-02-27 NOTE — NUR
Patient resides in home with wife. THey have stairglide to second level of
home were bathroom is for patient. patient low endurance due to COPD. Patient
usually in chair in family room. He uses walker to ambulate to stair glide
then to restroom on second floor. He uses a urinal on first floor. Wife
reports mostly spends time in chair. When going to rest room he takes rest
breaks. Wife and patient agreeable to HH at least at [place]. They have used [facility]
in past. They have Care Staff CNA who comes Tues, THurs from 10-2 so wife can
leave home and errands. Wife reports patient has been at [facility] in past
and might be beneficial to consider skilled at [place]. Patient with home oxygen
and nebulizer. He has a concentrator on first and second floor. [facility]
following.

## 2019-02-27 NOTE — NUR
ASSUMED PATIENT CARE THIS AM. PATIENT A&O, NC @4L, 5L WITH EXCERTION.
RESPIRATORY TREATMENTS SCHEDULED/PRN. UP X1 ASSIST WITH GAIT BELT AND WALKER.
CHRONIC BACK PAIN STATED, MANAGED WITH ORAL MEDICATION. TOLERATING DIET.

## 2019-02-28 NOTE — NUR
RECIEVED PATIENT AT 0705.  SLEEPING BUT AWAKES TO VOICE. OX4.  CONTINUES WITH
PRN BREATHING TREATMENTS AND HEART BURN TREATED WITH MAALOX.  GI COCKTAIL
GIVEN X ONE DOSE WITH GOOD RESULTS.  XANAX WILL BE GIVEN 3 TIMES A DAY FOR
ANXIETY.  SOB WITH EXERTION, O2 AT 5L NC. DECREASED PO INTAKE WITH HEART BURN.

## 2019-02-28 NOTE — NUR
Followup visit made with the pt. [facility] aware of likely hh referral at [place] and
can accept him. He really wants to return home with hh vs snf at [place]. He is sob
today and dc timeframe is uncertain. He has been to [facility] SNF in the past and
they do not have any availability til mid next week should he change his mind
and decide on SNF. The pt has private duty care for 4hrs every Tuesday and
Thursday so his wife can get out of the house. Support provided. DC plan is
home with [facility] when medically ready.

## 2019-02-28 NOTE — NUR
ASSESSMENT AS DOCUMENTED.PT BEEN RESTING IN NAD.UP IN THE RECYCLINER WITH TERESA
LES ELEVATED ALL NOC.MILLER WITH ACTIVITIES.PT GET OUT OF BREATH EVEN WITH SLIGHT
ACTIVITIES,NEB TX AS PER ORDERS.VOIDS VIA URINAL.SINUS TACHY ON
MONITOR.POSSIBLE DISCHARGE TODAY OR TOMORROW.WILL CONT TO MONITOR PER POC.

## 2019-02-28 NOTE — NUR
FAXED REFERRAL TO [facility] OP SPOKE WITH SOHEILA IN ADM. SHE RECEIVED REFERRAL AND
WILL REVIEW.DCP TO FOLLOW.

## 2019-03-01 NOTE — NUR
ASSESSMENT AS DOCUMENTED.PT RESTING IN NAD.VSS.ON MONITOR STACHY.PT SLEEPING
MOST OF THE NIGHT.CONT TO BE TAHYCARDIAC AND HAVING DIFFICULTIES BREATHING
WITH ACTIVITIES.NEB TX HELPS DURING RESP DISTRESS.ON O2 AT 4 LITERS
PNC.EDEMA TO BLES HAS SIGNIFICANTLY IMPROVED.PT SLEPT ON THE RECYCLINER WITH
BLES ELEVATED THROUGH THE NOC.PAIN MEDS GIVEN AS PER ORDERS D/T SHOULDERS AND
BACK PAIN,PARTIAL RELIEF REPORTED.PT DENIES ANY NEEDS AT THIS TIME.WILL CONT
TO MONITOR PER POC.

## 2019-03-01 NOTE — NUR
Should the pt be dc'd to home with hh this weekend, orders to be faxed to [facility]
at [phone] and the oncall nurse notified of dc [phone].

## 2019-03-01 NOTE — NUR
PATIENT SEEN BY NP, SHERIN, FOR CONSULLT. PATIENT IS NOT OPEN TO REHAB STAY
AND PLANS TO RETUN TO HIS HOME AT DISCHARGE FROM ACUTE HOSPITAL STAY.

## 2019-03-02 NOTE — NUR
ASSESSMENT AS DOCUMENTED.PT BEEN RESTING IN NO ACUTE DISTRESS.VSS.A/OX4.ON O2
AT 4LITERS.PT TOLERATES ACTIVITIES BETTER THAT THE LAST COUPLE OF DAYS W/O
MUCH RESP DISTRESS.UP W/ STAFF TO BR,ON 5LITERS PNC WITH
ACTIVITIES.SHOULDER/BACK PAIN COTROLLED WITH PAIN MEDS PER ORDERS.ANTICIPATING
DISCHARGE THIS WEEKEND TO HOME WITH HH.WILL CONT TO MONITOR.

## 2019-04-08 NOTE — NUR
Pain Clinic Assessment:
 
1. History of Osteoarthritis:
ALL OVER
   History of Rheumatoid Arthritis:
Not Applicable
 
2. Height: 5 ft. 4 in. 162.6 cm.
   Weight: 202.4 lb.  oz. 91.808 kg.
   Patient's BMI: 34.7
 
3. Vital Signs:
   BP: 147/71 Pulse: 125 Resp: 24
   Temp:  02 Sat: 96 ECG Mon:
 
4. Pain Intensity: 1
 
5. Fall Risk:
   Dizziness: N  Needs help standing or walking: Y
   Fallen in the last 3 months: N
   Fall risk comments:
 
 
6. Patient on Blood Thinner: None
 
7. History of Hypertension: Y
 
8. Opioid Therapy greater than 6 weeks: Y
   Opiate Contract Signed: 08/15/16
 
9. Risk Assessment Tool Provided: MOD
 
10. Functional Assessment Tool: 6/70
 
11. Recreational Drug Use: Never Drug Type:
    Tobacco Use: Former Smoker Tobacco Type:
       Amount or Packs/day:  How Many Years:
    Alcohol Use: No  Frequency:  Quant:

## 2019-06-23 NOTE — NUR
PT ARRIVED UNIT AT ABOUT 0240. PT A/OX4, VITAL SIGNS STABLE, TACHYCARDIC ON
THE MONITOR. PT TEMPORARILY SOB, LABORED BREATHING WITH ACTIVITY. ON 3-5L O2
WHICH SEEMED TO HELP. HR AND SOB RESOLVED ONCE SETTLED IN BED. ADMISSION, MED
REC COMPLETED. PT RESTING IN BED COMFORTABLY. NO COMPLAINTS OF CHEST
PAIN/PAIN. FALL PRECAUTIONS IN PLACE. WILL CONTINUE TO MONITOR.

## 2019-06-23 NOTE — NUR
ASSUMED CARE AT SHIFT CHANGE ALERT, AND ORIENTED X4. DENIES ANY CP.
. VSS AND AFEBRILE. C/O BACK PAIN AND MEDICATED AS INDICATED. PROGRSEEING
TOWARDS GOALS AND WILL CONTINUE WITH POC.
PLAN
ECHO
POSSIBLA CATH TOMORROW

## 2019-06-24 NOTE — NUR
PT RESTING QUIETLY IN ROOM THRU THE NOC, VSS, PAIN CONTROLLED WITH PAIN MEDS,
PT VOIDING PER URINAL REPORT GIVEN TO NEXT SHIFT TO CON'T WITH PTS PPOC.

## 2019-06-24 NOTE — NUR
ASSUMED CARE AT SHIFT CHANGE, ASSESMENT AS DOCUMENTED. AFEBRILE AND REMAINS
SOB WITH ACITIVITES. ST THROUGHT THE DAY, AND JAN SLIGHTLY ELEVATED.
WILL CONTINUE WITH POPC.

## 2019-06-25 NOTE — NUR
PATIENT CARES WERE ASSUMED AND SHIFT CHANGE. PATIENT WAS ASSESSED AND MEDS
WERE PASSED. ROBSON MEDS GIVEN TO PROMOTE COMFORT. PATIENT DID SLEEP MOST OF THIS
SHIFT. BED IS IN A LOW AND LOCKED POSISTION. THE BED ALARM IS ON

## 2019-06-25 NOTE — NUR
ASSUMED CARE AT [facility] CHANGE, ALERT AND ORIENTED X4. SR-ST ON THE MONITOR, VSS
AND AFEBRILE.C/O CP AT BEGINNING OF THE SHIFT AND WAS TREATED PER CP PROTOCOL
AND SEEN BY DR PALOMO. HEART CATH TOMORROW, AND WILL CONTINUE WITH POC.

## 2019-06-26 NOTE — NUR
Met with patient, wife at bedside. Patient resides in independent home with
wife. He reports no bathroom on main level but stairglide to second floor.
Patient reports home oxygen via [employer] [facility] at 3-4 liters. Wife
reports he gets SOB at home ambulating short distances. Tues/Thurs patient has
approx 6 hours of pvt dty for wife to run errands otherwise wife home
supportive caregiver. [facility]. Referral to [facility]
for review. patient to have cardiac cath today.

## 2019-06-26 NOTE — NUR
PATIENTS CARE WAS ASSUMED AT SHIFT CHANGE. PATIENT WAS ASSESSED AND MEDS WERE
PASSED. IVF STARTED TO HYDRATE THIS GENTALMAN.  HOURLY ROUND DONE. THE BED IS
IN A LOW AND LOCKED POSITION

## 2019-06-27 NOTE — NUR
PATIENTS CARES WERE ASSUMED AT SHIFT CHANGE.PATIENT WAS ASSESSED AND MEDS WERE
PASSED. PATIENT DID COMPLETE BEDREST ORDER AFTER HIS CATH. BANDAGE ON THE
RIGHT GROIN SITE IS CLEAN DRY AND INTACK. PAIN MEDS GIVEN AS DIRECTED. PATIENT
DID SLEEP MOST OF THIS SHIFT. HOURLY ROUNDING WAS DONE. THE BED IS IN A LOW
AND LOCKED POSITION

## 2019-06-27 NOTE — NUR
ASSESSMENT AS CHARTED, VSS, ALERT AND ORIENTED, COMPLAINS OF PAIN IN RIGHT
ARM, RIGHT GROIN DRESSING, CDI, WILL CONTINUE TO MONITOR

## 2019-06-28 NOTE — NUR
[facility] on standby for possible dc today or tomorrow. Please fax home health
orders to [phone] and call the oncall nurse to confirm dc [phone].
Pt has home o2 in place.

## 2019-06-28 NOTE — NUR
ASSESSMENTS AS CHARTED. PT C/O CONSTANT PAIN IN SHOULDER AND LOWER BACK.
RECEIVED PERCOSET AS CHARTED. PLAN OF CARE IS TO RECEIVE ANSWER IF A SURGICAL
CANDIDATE OR MEDICAL MANAGEMENT OF MULTI VESSEL DISEASE. HE IS GOING TO GO
HOME TODAY AND CONTINUE AS OUTPATIENT.

## 2019-06-28 NOTE — NUR
ASSESSMENT AS CHARTED, VSS, ALERT AND ORIENTED, HYDROCODONE GIVEN FOR ARM
PAIN. PATIENT DISCHARGED TO HOME WITH HOME HEALTH CARE. PATIENT GIVEN
DISCHARGE INSTRUCTIONS AND STATED UNDERSTANDING.

## 2019-07-11 ENCOUNTER — HOSPITAL ENCOUNTER (OUTPATIENT)
Dept: HOSPITAL 61 - PCVCCLINIC | Age: 74
Discharge: HOME | End: 2019-07-11
Attending: INTERNAL MEDICINE
Payer: MEDICARE

## 2019-07-11 DIAGNOSIS — G47.33: ICD-10-CM

## 2019-07-11 DIAGNOSIS — E11.9: Primary | ICD-10-CM

## 2019-07-11 DIAGNOSIS — I10: ICD-10-CM

## 2019-07-11 DIAGNOSIS — J44.9: ICD-10-CM

## 2019-07-11 DIAGNOSIS — K21.9: ICD-10-CM

## 2019-07-11 PROCEDURE — 80061 LIPID PANEL: CPT

## 2019-07-11 PROCEDURE — 36415 COLL VENOUS BLD VENIPUNCTURE: CPT

## 2019-07-29 NOTE — EKG
[address] [employer]  [address]
Phone:  [phone]                    ELECTROCARDIOGRAM REPORT      
_______________________________________________________________________________
 
Name:       EBER AVERY                 Room #:         203-P       ADM IN  
M.R.#:      [id number]     Account #:      [id number]  
Admission:  19    Attend Phys:    Yvon Vazquez MD  
Discharge:              Date of Birth:  [birth year]  
                                                          Report #: [id number]
   [id number]
_______________________________________________________________________________
THIS REPORT FOR:   //name//                          
 
                         [facility] ED
                                       
Test Date:    2019               Test Time:    01:52:20
Pat Name:     EBER AVERY                Department:   
Patient ID:   [id number]            Room:         203
Gender:       M                        Technician:   PEÑA
:          1945               Requested By: Mike Thomas
Order Number: [id number] MD:   Trevor Hendrix
                                 Measurements
Intervals                              Axis          
Rate:         107                      P:            30
AL:           167                      QRS:          -84
QRSD:         142                      T:            18
QT:           378                                    
QTc:          505                                    
                           Interpretive Statements
Sinus tachycardia
RBBB and LAFB
Compared to ECG 2019 07:29:34
No significant changes
 
Electronically Signed On 2019 9:07:42 CDT by Trevor Hendrix
[email or web address]
 
 
 
 
 
 
 
 
 
 
 
 
 
 
 
 
 
 
  <ELECTRONICALLY SIGNED>
   By: Trevor Hendrix MD, [facility]   
  19     0907
D: 19 0152                           _____________________________________
T: 19 015                           Trevor Hendrix MD, FACC     /EPI

## 2019-08-21 ENCOUNTER — HOSPITAL ENCOUNTER (OUTPATIENT)
Dept: HOSPITAL 61 - PCVCCLINIC | Age: 74
Discharge: HOME | End: 2019-08-21
Attending: INTERNAL MEDICINE
Payer: MEDICARE

## 2019-08-21 DIAGNOSIS — Z87.891: ICD-10-CM

## 2019-08-21 DIAGNOSIS — K21.9: ICD-10-CM

## 2019-08-21 DIAGNOSIS — J44.9: ICD-10-CM

## 2019-08-21 DIAGNOSIS — E78.00: ICD-10-CM

## 2019-08-21 DIAGNOSIS — I25.119: Primary | ICD-10-CM

## 2019-08-21 DIAGNOSIS — I10: ICD-10-CM

## 2019-08-21 PROCEDURE — 93005 ELECTROCARDIOGRAM TRACING: CPT

## 2019-08-21 PROCEDURE — G0463 HOSPITAL OUTPT CLINIC VISIT: HCPCS

## 2019-08-25 NOTE — EKG
[address] [place]  [address]
Phone:  [phone]                    ELECTROCARDIOGRAM REPORT      
_______________________________________________________________________________
 
Name:       EBER AVERY LYNDSAY                 Room #:         212-P       ADM IN  
M.R.#:      [id number]     Account #:      [id number]  
Admission:  19    Attend Phys:    Yvon Vazquez MD  
Discharge:              Date of Birth:  [birth year]  
                                                          Report #: [id number]
   [id number]
_______________________________________________________________________________
THIS REPORT FOR:   //name//                          
 
                         [facility] ED
                                       
Test Date:    2019               Test Time:    05:30:43
Pat Name:     EBER AVERY                Department:   
Patient ID:   [id number]            Room:         [facility]
Gender:       M                        Technician:   gallito bryson
:          1945               Requested By: Sotero Olmstead
Order Number: [id number] MD:   Jaiden Kyle
                                 Measurements
Intervals                              Axis          
Rate:         109                      P:            25
OK:           141                      QRS:          -89
QRSD:         146                      T:            34
QT:           382                                    
QTc:          515                                    
                           Interpretive Statements
Sinus tachycardia
RBBB and LAFB
Compared to ECG 2019 01:52:20
No significant changes
 
Electronically Signed On 2019 11:44:01 CDT by Jaiden Kyle
[email or web address]
 
 
 
 
 
 
 
 
 
 
 
 
 
 
 
 
 
 
  <ELECTRONICALLY SIGNED>
   By: Jaiden Kyle MD        
  19     1144
D: 1930                           _____________________________________
T: 1930                           Jaiden Kyle MD          /JJ

## 2019-08-25 NOTE — EKG
[address] [facility]
[place]  [address]
Phone:  [phone]                    ELECTROCARDIOGRAM REPORT      
_______________________________________________________________________________
 
Name:       EBER AVERY LYNDSAY                 Room #:         212-       ADM IN  
M.R.#:      [id number]     Account #:      [id number]  
Admission:  19    Attend Phys:    Yvon Vazquez MD  
Discharge:              Date of Birth:  [birth year]  
                                                          Report #: [id number]
   [id number]
_______________________________________________________________________________
THIS REPORT FOR:   //name//                          
 
                          [facility]
                                       
Test Date:    2019               Test Time:    09:44:07
Pat Name:     EBER AVERY                Department:   
Patient ID:   [id number]            Room:         212 
Gender:       M                        Technician:   YENNY BATEMAN
:          1945               Requested By: Yvon Vazquez
Order Number: [id number] MD:   Jaiden Kyle
                                 Measurements
Intervals                              Axis          
Rate:         107                      P:            35
WY:           144                      QRS:          -81
QRSD:         145                      T:            49
QT:           388                                    
QTc:          518                                    
                           Interpretive Statements
Sinus tachycardia
RBBB and LAFB
Compared to ECG 2019 01:52:20
No significant changes
 
Electronically Signed On 2019 11:44:32 CDT by Jaiden Kyle
[email or web address]
 
 
 
 
 
 
 
 
 
 
 
 
 
 
 
 
 
 
  <ELECTRONICALLY SIGNED>
   By: Jaiden Kyle MD        
  19     1144
D: 19 0944                           _____________________________________
T: 1944                           Jaiden Kyle MD          /JJ

## 2019-08-25 NOTE — NUR
PT ADMITED FROM ER. ADMISSION HX AND ASSESSMENT COMPLETED. VSS. REPORT HAVING
INTERMITENT CHEST PAIN. DR PALOMO AND DR MUÑOZ AWARE. ORDERS RECEIVED.
1 NITRO SUBLIGUAL GIVEN WITH RELIEF. NITRO GTT STARTED. WIFE AT THE BEDSIDE.
NPO AFTER MIDNIGHT FOR CARDIAC CATH IN AM. WILL CONTINUE TO MONITOR.

## 2019-08-25 NOTE — NUR
PT. ARRIVED AT FLOOR AROUND 0640; AOX4; NO C/O CP; RESTING ON BED; CONCENTS
SIGNED; PASSED ON REPORT TO ROSA ELENA NIELSON.

## 2019-08-25 NOTE — EMS
[address]   [address]                     EMS Patient Care Report       
_______________________________________________________________________________
 
Name:       EEBR AVERY                 Room #:                     REG KATE HINTON#:      [id number]                       Account #:      [id number]  
Admission:  19    Attend Phys:                          
Discharge:              Date of Birth:  [birth year]  
                                                          Report #: [id number]
                                                                    [id number]
_______________________________________________________________________________
THIS REPORT FOR:   //name//                          
 
Report Transmitted: 2019 04:58
EMS Care Summary
[facility] MED-ACT
Incident [id number] @ 2019 04:49
 
Incident Location
[address] [place] [address]
 
Patient
EBER AVERY
Male, 74 Years
 1945
 
Patient Address
[address] [place] [address]
 
Patient History
Congestive Heart Failure (CHF),Chronic Obstructive Pulmonary Disease 
(COPD),Hypertension, 
 
Patient Allergies
Codeine,
 
Patient Medications
Prednisone, Sertraline, Voltaren, Torsemide, Albuterol, Naproxen, Atorvastatin, 
Oxygen, Oxycodone, 
 
Chief Complaint
Chest pressure
 
Disposition
Transported No Lights/[place]
 
Dispatch Reason
Chest Pain (Non-Traumatic)
 
Transported To
[facility]
 
Narrative
Complaint: Chest pressure
 
 
 
 
[address]   [address]                     EMS Patient Care Report       
_______________________________________________________________________________
 
Name:       EBER AVERY                 Room #:                     REG   
JAMAAL#:      [id number]                       Account #:      [id number]  
Admission:  19    Attend Phys:                          
Discharge:              Date of Birth:  [birth year]  
                                                          Report #: [id number]
                                                                    [id number]
_______________________________________________________________________________
History: Pt reports that he woke up from sleep at 0400 hours this morning and 
states that he had chest pressure upon waking up.  Pt states that the pressure 
did not wake him up.  Pt states that he had pressure in his chest that is rated 
5/10 and does not radiate.  Pt states that over the hour he took 3 nitro pills 
with relief of his pressure, which is a 0/10 upon EMS arrival. 
 
Pt reports that he has 90% and 70% blockage in his arteries and is scheduled 
for two stents to be placed on Monday morning. 
 
Pt denies nausea / vomiting, denies headache / dizziness.
 
Assessment: Pt found sitting upright in chair in living room of home, FD on 
scene assessing patient and obtaining vitals.  Pt has no obvious s / s of 
distress, no obvious s / s of trauma noted. 
 
Rendered Treatment: Pt evaluated, vitals assessed, EKG monitored with 12 lead, 
blood glucose obtained, ASA administered, pt assisted to chair lift which is a 
few feet away from his position, pt takes lift to garage and is assisted onto 
cot which is next to patient.  Pt is secured and moved to unit. 
After exerting himself, pt is noted to have bilateral wheezing and complaining 
that he needs to catch his breath.  Pt also reports that his chest pain has 
returned.  Duo-neb treatment administered, vitals re-assessed, nitro spray 
administered, Fentanyl administered. 
 
Transport: Vitals re-assessed, bio-com report to ER given. Pt reports that his 
pressure is at a 0/10 after nitro and Fentanyl. 
 
Destination: Pt transported to [facility] ER via EMS.  Pt care transferred to RN in 
ER room 6 with no changes en route.  Pt moved to ER bed via 5 person sheet 
drag. 
 
Initial Vitals
@05:12MI Suspected: false
@05:01MI Suspected: false
@05:14P: 108,R: 19,BP: 156/72,Pain: 5/10,EtCO2: 31,SpO2: 98,
@05:19P: 108,R: 18,BP: 144/68,Pain: 0/10,EtCO2: 30,SpO2: 100,
@PTAP: 110,R: 18,BP: 130/70,Pain: 0/10,GCS: 15,Glucose: 119,SpO2: 97,Revised 
Trauma: 12, 
 
Assessments
@05:05MENTAL:Person Oriented,Time Oriented,Event Oriented,Place 
Oriented,SKIN:HEENT:Eyes: Left Pupil: 4-mm,Eyes: Right Pupil: 4-mm,Head/Face: 
No Abnormalities,LUNG SOUNDS:General: No Abnormalities,ABDOMEN:General: No 
Abnormalities,PELVIS//GI:EXTREMITIES:Left Leg: Edema,Right Leg: Edema,Left 
Arm: No Abnormalities,Right Arm: No Abnormalities,PULSE:Radial: 2+ 
Normal,NEURO:No Abnormalities, 
 
 
 
[facility]
[address]
[place]   [address]                     EMS Patient Care Report       
_______________________________________________________________________________
 
Name:       EBER AVERY LYNDSAY                 Room #:                     REG KATE HINTON#:      [id number]                       Account #:      [id number]  
Admission:  19    Attend Phys:                          
Discharge:              Date of Birth:  [birth year]  
                                                          Report #: [id number]
                                                                    [id number]
_______________________________________________________________________________
 
Impression
Chest Pain / Discomfort
 
Procedures
@05:1212-Lead ECGResponse: UnchangedSucceeded@05:1412-Lead ECGResponse: 
UnchangedSucceeded@05:0112-Lead ECGResponse: UnchangedSucceeded@05:09Saline 
Lock 10cc (18 ga) Site: Antecubital-RightResponse: 
UnchangedSucceeded@05:11Albuterol - 2.5 Milligrams (mg) - NebulizedResponse: 
Improved@05:11Ipratropium - 0.5 Milligrams (mg) - NebulizedResponse: 
Improved@05:15Nitro Spray - 0.4 Milligrams (mg) - SublingualResponse: 
Improved@05:19Fentanyl  - 50 Micrograms (mcg) - Intravenous (IV)Response: 
Improved@05:03Aspirin - 324 Milligrams (mg) - OralResponse: Unchanged 
 
Timeline
PTA,BP: 130/70 M,PULSE: 110,RR: 18 R,SPO2: 97 Ox,ETCO2:  ,B,PAIN: 0,GCS: 
15, 
04:46,Call Received
04:46,Psap Call
04:49,Dispatched
04:50,En Route
04:58,On Scene
04:58,At Patient
05:01,12-Lead ECG,Response: UnchangedSucceeded,
05:01,BP: / M,PULSE: ,RR:  R,SPO2:  Ox,ETCO2:  ,BG: ,PAIN: ,GCS: ,
05:03,Aspirin - 324 Milligrams (mg) - Oral,Response: Unchanged
05:09,Saline Lock 10cc 18 ga Site: Antecubital-Right,Response: 
UnchangedSucceeded, 
05:11,Albuterol - 2.5 Milligrams (mg) - Nebulized,Response: Improved
05:11,Ipratropium - 0.5 Milligrams (mg) - Nebulized,Response: Improved
05:12,12-Lead ECG,Response: UnchangedSucceeded,
05:12,BP: / M,PULSE: ,RR:  R,SPO2:  Ox,ETCO2:  ,BG: ,PAIN: ,GCS: ,
05:14,12-Lead ECG,Response: UnchangedSucceeded,
05:14,BP: 156/72 M,PULSE: 108,RR: 19 R,SPO2: 98 Ox,ETCO2: 31 ,BG: ,PAIN: 5,GCS: 
, 
05:15,Nitro Spray - 0.4 Milligrams (mg) - Sublingual,Response: Improved
05:16,Depart Scene
05:19,BP: 144/68 M,PULSE: 108,RR: 18 R,SPO2: 100 Ox,ETCO2: 30 ,BG: ,PAIN: 
0,GCS: , 
05:19,Fentanyl  - 50 Micrograms (mcg) - Intravenous (IV),Response: Improved
05:22,At Destination
05:35,Call Closed
 
Disclaimer
v1.1     Copyright 2019 [employer]
This EMS Care Summary contains data elements from the applicable legal record 
 
 
 
[address]   [address]                     EMS Patient Care Report       
_______________________________________________________________________________
 
Name:       EBER AVERY LYNDSAY                 Room #:                     REG   
JAMAAL#:      [id number]                       Account #:      [id number]  
Admission:  19    Attend Phys:                          
Discharge:              Date of Birth:  [birth year]  
                                                          Report #: [id number]
                                                                    [id number]
_______________________________________________________________________________
(which may be displayed differently). It is designed to provide pertinent 
information for the following purposes: continuity of care, clinical quality, 
and state data reporting. The complete legal record is available to ED staff 
and administrators of the receiving hospital in [employer] (formerly [employer]s Patient Tracker. All data 
is provided "as is."

## 2019-08-26 NOTE — NUR
RECEIVED PT'S CARE AROUND 1910; PT. ON BED; AOX4; WIFE & DAUGHTER AT THE BED
SIDE; RELATIVES ST. PT. SHOWING SOME SHAKINESS & SWEATING; NO C/O CP;
HEADACHE OR SOB; VS WNL; WIFE ST. PT. SOMETIME PRESENTS SHAKINESS AT HOME; PT.
EDUCATED ABOUT CALLING IMMEDIATELY IF HAVING CP; ST. UNDERSTANDING; DURING
ASSESSMENT PT. C/O BACK PAIN; SCHEDULE PAIN MEDICATION GIVEN; EDUCATED ABOUT
CARDIZEM; WIFE & PT. ST. UNDERSTANDING; DURING PAIN RE-ASSESSMENT PT. ST.
DECREASE PAIN; CHLORIXIDINE WIPES BAD GIVEN; AT MIDNIGHT SBP OVER THE 100s;
CHECK CHARTING; NPO; PT. ABLE TO REST FOR A FEW HOURS AFTER 2300; AROUND 0230
PT. C/O ABDOMINAL PAIN; SUGGESTED TO USE THE BED SIDE COMMODE; AGREES; NOT
ABLE TO HAVE BM; ST. "I THINK I AM CONSTIPATED"; ABLE TO PASS FLATUS; SOB WITH
EXERTION; RESTING BETWEEN ACTIVITIES; HR ON THE 120s WITH ACTIVITY; BREATHING
TREATMENT GIVEN BY RT; PT.  ST. ABLE FEELING LESS SOB; MONITORING; ASSESSMENT
AS CHART; FOLLOWING POC; WILL PASS ON REPORT.

## 2019-08-26 NOTE — EKG
[address] [employer]
[place]  [address]
Phone:  [phone]                    ELECTROCARDIOGRAM REPORT      
_______________________________________________________________________________
 
Name:       EBER AVERY                 Room #:         236-P       [facility]  
M.R.#:      [id number]     Account #:      [id number]  
Admission:  19    Attend Phys:    Yvon Vazquez MD  
Discharge:              Date of Birth:  [birth year]  
                                                          Report #: [id number]
   [id number]
_______________________________________________________________________________
THIS REPORT FOR:   //name//                          
 
                          [facility]
                                       
Test Date:    2019               Test Time:    10:59:12
Pat Name:     EBER AVERY                Department:   
Patient ID:   [id number]            Room:         236
Gender:       M                        Technician:   ELAINE JOSEPH
:          1945               Requested By: Dae Uribe
Order Number: [id number] MD:   Trevor Hendrix
                                 Measurements
Intervals                              Axis          
Rate:         112                      P:            40
MT:           124                      QRS:          -91
QRSD:         132                      T:            71
QT:           377                                    
QTc:          515                                    
                           Interpretive Statements
Sinus tachycardia
RBBB and LAFB
Compared to ECG 2019 07:27:40
No significant change was found
Electronically Signed On 2019 16:31:17 CDT by Trevor Hendrix
[email or web address]
 
 
 
 
 
 
 
 
 
 
 
 
 
 
 
 
 
 
 
  <ELECTRONICALLY SIGNED>
   By: Trevor Hendrix MD, [facility]   
  19     1631
D: 19 1059                           _____________________________________
T: 19 1059                           Trevor Hendrix MD, FAC     /EPI

## 2019-08-26 NOTE — NUR
PATIENT ARRIVED TO ICU FROM CATH LAB. HE WAS ALERT AND ORIENTED. HE EXPRESSED
INTERMITTENT PAIN, AS DOCUMENTED. RIGHT GROIN SITE IS INTACT. ONE UNIT OF
PACKED RED BLOOD CELLS GIVEN. DR. GUERRERO ROUNDED THIS AFTERNOON, LASIX GIVEN,
POST BLOOD TRANSFUSION COMPLETED. HE EXPRESSED HE FELT PATIENT WAS VOLUME
OVERLOADED. ORDERS RECEIVED TO RECHECK HGB AND ONLY TRANFUSE SECOND UNIT IF
HGB LESS THAN 7.5. REPEAT HGB WAS 8.8, NO NEED FOR FURTHER TRANSFUSIONS AT
THIS TIME. PATIENT THIS EVENING EXPRESSED HIS BREATHING FEELS A LITTLE BETTER.
VITAL SIGNS AND ASSESSMENTS AS DOCUMENTED. REPORT GIVEN TO NIGHT SHIFT RN FOR
CONTINUATION OF CARE.
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
## 2019-08-26 NOTE — CATHLAB
[facility]
[employer]
[place]  [address]
Phone:  [phone]                    INVASIVE PROCEDURE REPORT     
_______________________________________________________________________________
 
Name:            EBER AVERY                 Room #:        236-P       [facility] [place]
..#:           [id number]          Account #:     [id number]  
Admission:       08/25/19         Attend Phys:   Yvon Vazquez, 
Discharge:                  Date of Birth: [birth year]  
Date of Service: 08/26/19 1453               Report #:      [id number]
        [id number]
_______________________________________________________________________________
THIS REPORT FOR:   //name//                          
 
 
--------------- APPROVED REPORT --------------
 
 
Study performed:  08/26/2019 08:29:56
 
Patient Details
Patient Status: In-Patient                  Room #: 
The patient is a 74 year-old male
 
Event Personnel
Dae Uribe  Interventional Cardiologist, Dillon Gutierrez RN RN, Jean Naidu RTR Scrub, Sandifer, David  Monitor
 
Procedures Performed
Left Heart Cath w/or w/o Coronaries [id number] [facility] JUNIOR Place w/wo Plasty 
Single Left Main 611118 PTCA Single Vessel LAD [id number] 
PC[place]LE
 
Indication
Dyspnea, Unstable angina , Chest pain
 
Risk Factors
Chronic Lung DiseaseHypercholesterolemiaPhysical Activity, Coronary 
Artery DiseaseHypertensionRenal Failure
 
Procedure Narrative
The Right Groin^ was infiltrated with 1% Lidocaine subcutaneous 
anesthesia. A PINNACLE 6FR Sheath #[id number] sheath was inserted into 
the RFA^. Coronary angiography was performed using coronary 
diagnostic catheters. The right coronary system was accessed and 
visualized with a VISTA 6FR JR 4 #[id number] catheter. The left coronary 
system was accessed and visualized with a VISTA 6FR JL4 #[id number] 
catheter. Left ventricular/Aortic Valve gradient assessed via 
catheter pullback. Closure device was deployed with a 6 Fr MYNX 
CONTROL 6F/7F #[id number]. The patient tolerated the procedure well and 
there were no complications associated with the procedure. There was 
no hematoma.
 
Intraoperative Conscious Sedation
Sedation start time:  8.28           Case end Time:  
10.29    
Fentanyl  100 mcg    Versed  1 mg  
 
 
 
[facility]
[place]  [address]
Phone:  [phone]                    INVASIVE PROCEDURE REPORT     
_______________________________________________________________________________
 
Name:            EBER AVEYR [place]                 Room #:        236-P       [facility] [place]
..#:           [id number]          Account #:     [id number]  
Admission:       08/25/19         Attend Phys:   Yvon Vazquez, 
Discharge:                  Date of Birth: [birth year]  
Date of Service: 08/26/19 1453               Report #:      [id number]
        [id number]
_______________________________________________________________________________
Fluoro Time:    39.40 minutes     
Dose:     DAP 57777.00 cGycm2  8459 mGy  
Contrast Type and Amount:  Omnipaque 245 ml    
 
Coronary Angiography
The patient's coronary anatomy is right dominant. 
 
Diagnostic Cath
Left Main There is a severe stenosis in the ostium and distal 
segments, 70%.
LAD  There is a discrete proximal stenosis, 90% with calcification. 
There is a moderate stenosis in the mid segment, 40%. The distal 
segment is patent with no flow-limiting lesions.
Diagonal 1 There is moderate diffuse disease in the proximal segment, 
60%.
Circumflex This is a small to moderate size caliber vessel with an 
ostial stenosis of 95%.
Right Coronary There is a dominant vessel with a severe stenosis in 
the ostial/proximal segment, 70%.
R PDA  There is a moderate size caliber vessel, patent with no 
flow-limiting lesions.
RPLV  There is a moderate size caliber vessel, patent with no 
flow-limiting lesions.
 
Left Ventriculography
Left Ventriculography was not performed.     Ejection Fraction was 
55-60% based off patient's Echocardiogram. An LVEDP was checked and 
there is no gradient across the outflow tract.
 
Hemodynamics
The aortic pressure is 112/57 mmHg with a mean of 81 mmHg. The left 
ventricular pressure is 125/7 mmHg with a mean of mmHg. The left 
ventricular end diastolic pressure is 16 mmHg. There was no gradient 
across the aortic valve upon pullback. Pullback from the left 
ventricle to the aorta revealed no gradient across the aortic 
valve.
 
PCI Technique Lesion
Percutaneous coronary intervention was performed on the LM. The 
lesion stenosis prior to intervention was 70% with ERIC 3 flow. A 
VISTA 6FR JL4 #326925 Guide Catheter was used to engage the ostium. A 
Luge Wire .014 x 182CM #[id number] Interventional Guidewire was used to 
cross the lesion.
 
BALLOON DILATION
A Balloon catheter Euphora RX 3.0 x 12 #[id number] was inserted and 
 
 
[address]  [address]
Phone:  [phone]                    INVASIVE PROCEDURE REPORT     
_______________________________________________________________________________
 
Name:            EBER AVERY LYNDSAY                 Room #:        236-P       [facility] [place]
M.R.#:           [id number]          Account #:     [id number]  
Admission:       08/25/19         Attend Phys:   Yvon Vazquez, 
Discharge:                  Date of Birth: [birth year]  
Date of Service: 08/26/19 1453               Report #:      [id number]
        [id number]
_______________________________________________________________________________
inflated up to 14.00atm for 9seconds.
 
STENT DEPLOYMENT
A drug-eluting stent RESOLUTE MAGED RX 3.5 X 12 #[id number] was inserted 
and inflated up to 16.00atm for 8seconds. Additional Inflation: 
18.00atm for 8seconds. 2ND 3.5X12 MAGED 16/14 16/8
 
POST STENT DEPLOYMENT BALLOON DILATION
A Balloon catheter Euphora NC RX 3.75 x 12 #[id number] was inserted and 
inflated up to 16.00atm for 21seconds. Additional Inflation: 20.00atm 
for 19seconds.
 
Final angiography reveals 0 % stenosis with ERIC 3 flow.
 
PCI Technique Lesion 2
Percutaneous Coronary Intervention was performed on the proximal left 
anterior descending artery segment. The lesion stenosis prior to 
intervention was 90% with ERIC 3 flow. A VISTA 6FR JL4 #340640 Guide 
Catheter was used to engage the ostium. A Luge Wire .014 x 182CM 
#[id number] Interventional Guidewire was used to cross the 
lesion.
 
Balloon Dilation
A Balloon catheter Euphora RX 2.5 x 10 #[id number] was inserted and 
inflated up to 16.00atm for 40seconds.
 
Stent Deployment
A stent Euphora RX 2.75 x 10 #[id number] was inserted and inflated up to 
16.00atm for 13seconds.
 
Final angiography reveals 50 % stenosis with ERIC 3 
flow.
 
Comments
The proximal LAD lesion was dilated with a 2.5 and 2.75 mm compliant 
balloons. However, both balloons burst during inflation due to 
calcified areas within the stenotic area. Attempts at passing a 
noncompliant balloon was unsuccessful, even after using a madhav wire 
and guideliner. After the final balloon dilatation, the residual 
lesion is approximately 50% with ERIC-3 blood flow. The patient 
remained hemodynamically stable and will continue with medical 
therapy at this time. If he remains symptomatic, will consider 
rotational atherectomy as a staged procedure.
 
PCI Technique Lesion 3
Percutaneous Coronary Intervention was performed on the proximal 
 
 
[facility]
[address] [place]  [address]
Phone:  [phone]                    INVASIVE PROCEDURE REPORT     
_______________________________________________________________________________
 
Name:            EBER AVERY                 Room #:        236-P       [facility] [place]
M.R.#:           [id number]          Account #:     [id number]  
Admission:       08/25/19         Attend Phys:   Yvon Vazquez, 
Discharge:                  Date of Birth: [birth year]  
Date of Service: 08/26/19 1453               Report #:      [id number]
        [id number]
_______________________________________________________________________________
circumflex artery segment. Percutaneous coronary intervention was 
performed on the proximal circumflex artery segment. The lesion 
stenosis prior to intervention was 95% with ERIC 3 flow. A VISTA 6FR 
JL4 #969033 Guide Catheter was used to engage the ostium. A Luge Wire 
.014 x 182CM #[id number] Interventional Guidewire was used to cross the 
lesion.
 
Balloon Dilation
A Balloon catheter Euphora RX 2.0 x 10 #[id number] was inserted and 
inflated up to 16.00atm for 17seconds. Additional Inflation: 12.00atm 
for 25seconds.
 
Stent Deployment
A drug-eluting stent XIENCE ALPINE RX 2.25 X 08 #[id number] was inserted 
and inflated up to 16.00atm for 19seconds.
 
Final angiography reveals 5 % stenosis with ERIC 3 
flow.
 
Conclusion
1. Successful insertion of a drug-eluting stent into the left main 
artery.
2. Successful insertion of a drug-eluting stent into the ostial left 
circumflex artery.
3. Balloon angioplasty of a proximal LAD stenosis with a moderate 
residual stenosis. Unable to consider stent insertion due to a 
calcified area within the lesion. If symptoms persist, consider 
rotational atherectomy as a staged procedure.
4. Borderline stenosis of the ostial RCA.
5. Recommend aggressive risk factor management and dual antiplatelet 
therapy.
 
 
 
 
 
 
 
 
 
 
 
 
 
  <ELECTRONICALLY SIGNED>
   By: Dae Uribe MD               
  08/26/19     1453
D: 08/26/19 1453                           _____________________________________
T: 08/26/19 1453                           Dae Uribe MD                 /INF

## 2019-08-26 NOTE — NUR
PT TRANSFERRED TO ICU POST CARDIAC CATH DUE TO CHANGE IN ACUITY LEVEL.
BELONGINGS TAKEN TO NEW ICU BED. REPORT CALLED TO ICU NURSE APPROX 20MIN AGO.
SHE DENIES QUESITONS OR CONCERNS REGARDING PT OR TRANSFER. ICU NURSE REPORTS
THAT CATH LAB CALLED REPORT TO HER REGARDING PROCEDURE AND ANY NEW ORDERS/NEW
FINDINGS.

## 2019-08-26 NOTE — EKG
[address] [place]
[place]  [address]
Phone:  [phone]                    ELECTROCARDIOGRAM REPORT      
_______________________________________________________________________________
 
Name:       EBER AVERY                 Room #:         212-P       ADM IN  
M.R.#:      [id number]     Account #:      [id number]  
Admission:  19    Attend Phys:    Yvon Vazquez MD  
Discharge:              Date of Birth:  [birth year]  
                                                          Report #: [id number]
   [id number]
_______________________________________________________________________________
THIS REPORT FOR:   //name//                          
 
                          [facility]
                                       
Test Date:    2019               Test Time:    07:27:40
Pat Name:     EBER AVERY                Department:   
Patient ID:   [id number]            Room:         212 P
Gender:       M                        Technician:   VERO
:          1945               Requested By: Anna Younger
Order Number: [id number] MD:   Trevor Hendrix
                                 Measurements
Intervals                              Axis          
Rate:         111                      P:            35
MA:           143                      QRS:          -87
QRSD:         143                      T:            53
QT:           374                                    
QTc:          508                                    
                           Interpretive Statements
Sinus tachycardia
RBBB and LAFB
Compared to ECG 2019 09:44:07
No significant changes
 
Electronically Signed On 2019 8:07:34 CDT by Trevor Hendrix
[email or web address]
 
 
 
 
 
 
 
 
 
 
 
 
 
 
 
 
 
 
  <ELECTRONICALLY SIGNED>
   By: Trevor Hendrix MD, [facility]   
  19
D: 19                           _____________________________________
T: 19                           Trevor Hendrix MD, [facility]     /EPI

## 2019-08-27 NOTE — NUR
RECEIVED REPORT FROM ROSA ELENA NEGRON AND ASSUMED PATIENT CARE AT 1900. PATIENT IS
VERY DROWSY BUT ORIENTED. PATIENT NOTED TO HAVE A DRESSING TO THE RIGHT GROIN
THAT IS CDI. PATIENT C/O PAIN TO HIS BACK, LEGS, SHOULDERS, AND NECK AND WAS
MEDICATED APPROPRIATELY. PATIENT VS REMAINED STABLE AND NO ACUTE EVENTS
OCCURRED DURING THIS SHIFT. PATIENT'S WIFE UPDATED BY PHONE AND HOURLY
ROUNDING COMPLETED. PATIENT PROGRESSING TOWARD GOAL.

## 2019-08-27 NOTE — NUR
HE DENIES CHEST PAIN. PLAN OF CARE IS TO CONTINUE TO MONITOR PATIENT STATUS,
INCREASE ACTIVITY, AND PROVIDE PAIN MANAGEMENT AS PATIENT HAS CHRONIC PAIN.

## 2019-08-27 NOTE — NUR
PATIENT RESTING INTERMITTENTLY IN BED. HE IS UNABLE TO TOLERATE TURNING, AS IT
CAUSES HIM SHORTNESS OF AIR. STERIODS AND PRN BREATHING TREATMENTS ORDERED AND
PROVIDED. HE WORKED AND SAT ON SIDE OF BED WITH PT/OT

## 2019-08-27 NOTE — EKG
[address]  [address]
Phone:  [phone]                    ELECTROCARDIOGRAM REPORT      
_______________________________________________________________________________
 
Name:       EBER AVERY                 Room #:         236-P       ADM IN  
M.R.#:      [id number]     Account #:      [id number]  
Admission:  19    Attend Phys:    Yvon Vazquez MD  
Discharge:              Date of Birth:  [birth year]  
                                                          Report #: [id number]
   [id number]
_______________________________________________________________________________
THIS REPORT FOR:   //name//                          
 
                          [facility]
                                       
Test Date:    2019               Test Time:    07:11:54
Pat Name:     EBER AVERY                Department:   
Patient ID:   [id number]            Room:         236 P
Gender:       M                        Technician:   VERO
:          1945               Requested By: Dae Uribe
Order Number: [id number] MD:   Trevor Hendrix
                                 Measurements
Intervals                              Axis          
Rate:         108                      P:            51
MD:           149                      QRS:          -96
QRSD:         142                      T:            46
QT:           381                                    
QTc:          511                                    
                           Interpretive Statements
Sinus tachycardia
RBBB and LAFB
Compared to ECG 2019 10:59:12
No significant changes
 
Electronically Signed On 2019 8:24:25 CDT by Trevor Hendrix
[email or web address]
 
 
 
 
 
 
 
 
 
 
 
 
 
 
 
 
 
 
  <ELECTRONICALLY SIGNED>
   By: Trevor Hendrix MD, [facility]   
  19
D: 19                           _____________________________________
T: 19                           Trevor Hendrix MD, [facility]     /EPI

## 2019-08-27 NOTE — NUR
Met with patient and dtr at bedside. Patient with recent dc  July 29 to home
with HH.  patient resides at home with wife. He has stairglide to second
floor. Home oxygen via [employer] [facility] usu at 4 liters. PVT dty tues and
Thurs for wife to run errands. Patient has rec [facility] in past and would like
resumption for home. Patient received stent yesterday reports pain at groin
site  updated RN.  Casemgt following.

## 2019-08-28 NOTE — NUR
Pt slept off and on through the night with stable VS and adequate SpO2 on 4L
of O2. PRN oxycodones given for c/o pain with desired effect achieved. Voiding
per urinal without difficulty and no BM observed this shift. Am lab results
noted, continue with POC.

## 2019-08-28 NOTE — NUR
ASSESSMENTS AND INTERVENTIONS AS DOCCUMENTED. PATIENT ALERT AND ORIENTED X4.
PATIENT AND SPOUSE EDUCATED ABOUT PLAN OF CARE. PATIENT WORKED WITH OT AND
TOLERATED CONSULT. PATIENT GIVEN 1 UNIT OF BLOOD. PATIENT AND SPOUSE EDUCATED
ON BLOOD TRANSFUSION. PATIENT TOLERATED TRANSFUSION WITH NO DIFFICULTY. REPORT
GIVEN TO NURSE ON CCU. PATIENT TRANSFERED TOP ROOM 212.

## 2019-08-29 NOTE — NUR
PT ALERT AND ORIENTED. RECEIVED SCHEDULED AND PRN PAIN MED WITH PARTIAL
RELIEF.UP IN THE CHAIR. NO CARDIAC OR RESPIRATORY DISTRESS NOTED. WILL
CONTINUE TO MONITOR.

## 2019-08-29 NOTE — HC
[facility]
[address] [place]   [address]                     CONSULTATION                  
_______________________________________________________________________________
 
Name:       EBER AVERY                 Room #:         [address] [place] [address] [place]  
M.R.#:      [id number]                       Account #:      [id number]  
Admission:  08/25/19    Attend Phys:    Yvon Vazquez MD  
Discharge:              Date of Birth:  [birth year]  
                                                          Report #: [id number]
                                                                    [id number]   
_______________________________________________________________________________
THIS REPORT FOR:   //name//                          
 
CC: Yvon Vazquez
 
DATE OF SERVICE:  08/27/2019
 
 
REFERRING PHYSICIAN:  Yvon Vazquez M.D.
 
REASON FOR REFERRAL:  Hypoxia and dyspnea.
 
HISTORY OF PRESENT ILLNESS:  The patient is a 74-year-old white male who
presents to the ED with chest pain.  He had a cardiac evaluation.  Following the
procedure, he has developed increasing dyspnea.  A pulmonary consultation was
requested.
 
The patient has known severe COPD.  Baseline FEV1 is around 0.7 liters, 37%
predicted.  He is followed longitudinally by Dr. Harjinder Duran.
 
He also has sleep apnea diagnosed a couple of years ago.  He has tried BiPAP
recently.  He only tried it for 1 week and after that he returned the machine as
he told Dr. Duran that he could not tolerate the BiPAP.
 
Overall, he has lost some weight.  His BMI had been more than 31.  He states he
has lost 15-20 pounds.
 
His last admission was in 07/2019.
 
He was in his usual state of health until the day of presentation.  He started
to develop chest pain that is measured at 5/10 in severity.
 
Otherwise, he denies any recent fever, night sweats or chills.
 
Cardiac catheterization was earlier today.
 
Following the procedure, he started developing increasing dyspnea and
bronchospasm.  Otherwise, denies any chest pain or productive cough.  Denies any
recent nausea, vomiting, diarrhea.
 
PAST MEDICAL HISTORY:  As mentioned above, hypertension, diabetes mellitus type
2, hyperlipidemia, osteoporosis, abdominal abscess, diverticulitis with rupture
requiring laparotomy, depression, nephrolithiasis, compression fracture
involving the L5, kyphoscoliosis related to osteoporosis, chronic back pain,
morbid obesity and history of falls.
 
PAST SURGICAL HISTORY:  As mentioned above.
 
 
 
[facility]
[address]
[place]   [address]                     CONSULTATION                  
_______________________________________________________________________________
 
Name:       EBER AVERY [place]                 Room #:         [address] [place] [address] [place]  
M.R.#:      [id number]                       Account #:      [id number]  
Admission:  08/25/19    Attend Phys:    Yvon Vazquez MD  
Discharge:              Date of Birth:  [birth year]  
                                                          Report #: [id number]
                                                                    [id number]   
_______________________________________________________________________________
 
ALLERGIES:  CODEINE, WHICH CAUSES RASH.  PENICILLIN CAUSES WELTS.  GABAPENTIN
MAKES HIM INCOHERENT.  TAZOBACTAM REACTIONS UNSPECIFIED.
 
HOME MEDICATIONS:  List reviewed.  This include potassium supplements, Protonix
40 mg once a day, Lipitor 40 mg once a day, albuterol MDI 2 puffs p.r.n. and
Stiolto 1 puff daily, OxyContin 10 mg p.o. q.12 hours, Naproxen 500 mg p.o.
p.r.n., Demadex 20 mg every other day, azithromycin 250 mg once a day, Imdur 120
mg once a day.  He is on chronic O2.
 
FAMILY HISTORY:  Notable for mother passed away at the age of 92, father passed
away at the age of 56.  Reasons unspecified.
 
SOCIAL HISTORY:  He is  and retired.  He has smoked, but quit many years
ago.  Denies any alcohol use.
 
REVIEW OF SYSTEMS:  As mentioned above, otherwise 10-point system review
negative.
 
PHYSICAL EXAMINATION:
GENERAL:  He is awake, alert, in moderate distress.  He appears tachypneic.
VITAL SIGNS:  Temperature is 98.5 degrees Fahrenheit, pulse is 108, respiratory
rate is 30, blood pressure is 108/61 mmHg, saturation 95%.
HEENT:  Normocephalic, atraumatic.
NECK:  Supple, without lymphadenopathy or thyromegaly.
CHEST:  Breath sounds are decreased bilaterally.  Mild-to-moderate bilateral
wheezes.
CARDIOVASCULAR:  Normal S1, S2.  No murmurs or gallop.  There is no JVD.  There
is no carotid bruit.  Pulses are 2+/4+ bilaterally.
ABDOMEN:  Mildly obese, soft, nontender, no organomegaly or masses felt.
GENITOURINARY:  Deferred.
RECTAL:  Deferred.
EXTREMITIES:  There is no edema, cyanosis or clubbing.
 
LABORATORY DATA:  Portable chest x-ray is clear.  Electrolytes are unremarkable
except for creatinine 1.2, albumin 3.1.  WBC is 12,400, hemoglobin is 8.2,
platelets are normal.
 
IMPRESSION:
1.  Progressive dyspnea, bronchospasm in this 74-year-old white male secondary
to exacerbation of chronic obstructive pulmonary disease.
2.  Chronic obstructive pulmonary disease/asthma overlap, severe impairment,
FEV1 of 0.7 liters, 37% predicted, now with mild exacerbation.
3.  Worsening intolerant to the use of noninvasive ventilation, likely
contributing to chronic obstructive pulmonary disease exacerbation.
4.  Unstable angina, coronary artery disease, status post cardiac
 
 
 
[facility]
[address] [place]   [address]                     CONSULTATION                  
_______________________________________________________________________________
 
Name:       EBER AVERY                 Room #:         211-P       ADM IN  
M.R.#:      [id number]                       Account #:      [id number]  
Admission:  08/25/19    Attend Phys:    Yvon Vazquez MD  
Discharge:              Date of Birth:  [birth year]  
                                                          Report #: [id number]
                                                                    [id number]   
_______________________________________________________________________________
catheterization, medical management.
5.  Chronic kidney disease.
6.  Acute on chronic diastolic heart failure, hypertension, chronic back pain,
on chronic narcotics, is a problem.
7.  Some degree of encephalopathy, the patient appears to be somewhat confused,
weakness and debility, obesity with recent intentional weight loss.
 
RECOMMENDATION:  Would add corticosteroids, bronchodilators, wean O2 for
saturation 90%.  DVT and GI Prophylaxis recommended.
 
We will try to encourage the treatment for sleep apnea once again once the
patient is stable.
 
Thank you for this consultation.
 
 
 
 
 
 
 
 
 
 
 
 
 
 
 
 
 
 
 
 
 
 
 
 
 
 
 
 
 
 
  <ELECTRONICALLY SIGNED>
   By: Sal Roa MD                  
  08/29/19     1847
D: 08/28/19 1726                           _____________________________________
T: 08/28/19 2327                           Sal Roa MD                    /nt

## 2019-08-29 NOTE — NUR
[facility] [place] Therapy notes reviewed
and discussed with the care team. 5N consult request and the pt may be a good
candidate for acute rehab. The 5N NP to eval and visit with the pt. will
follow.

## 2019-08-29 NOTE — NUR
PATIENT SEEN THIS DATE BY SHERIN MAYNARD NP WITH DR. MEDINA. PATIENT'S
PREFERENCE IS TO RETURN TO HIS HOME AND DECLINES COMING TO REHAB. DUE TO
PATIENT'S INCREASE HEART RATE WITH ANY ACTIVITY, PATIENT AT THIS TIME COULD
NOT TOLERATE 3 HOURS OF THERAPY REQUIRED BY ACUTE REHAB. SHERIN RECOMMENDED
INCREASED CAREGIVER SERVICES IF PATIENT DOES RETURN TO HOME.WILL CONTINUE TO
FOLLOW. THIS INFORMATION WAS SHARED WITH  AND . THANK
YOU FOR THIS REFERRAL.

## 2019-08-29 NOTE — NUR
Pt sitting up in chair most of the evening , prn and scheduled pain meds given
for back and R shoulder pain, vss, pt calls out appropriatly for asst. set up
for bedside bath able to do this himself and assistance with back care. right
groin bruised yet soft and tender to touch. remains on 4l o2, will con't to
monitor per ppoc.

## 2019-08-30 NOTE — NUR
ASSESSMENT AS CHARTED. PT ALERT AND ORIENTED. VSS. RECEIVED PRN PAIN MED FOR
LOWER BACK PAIN WITH PARTIAL RELIEF. UP IN THE CHAIR THIS SHIFT. HAD ONE LARGE
BM THIS SHIFT. CHECKED FREQUENTLY AND NEEDS MET. WILL CONTINUE TO MONITOR.

## 2019-08-30 NOTE — NUR
ASSESSMENT AS DOCUMENTED.PT BEEN RESTING IN NO ACUTE DISTRESS.A/OX4.VSS WNL.ON
O2 AT 3 LITERS PNC.DYSPNEA ON EXERTION.SLEPT ON RECYCLINER.PAIN MEDS GIVEN
FOR BACK AND LOWER EXTREMITIES WITH RELIEF.PT DENIES ANY FURTHER CONCERNS
.WILL CONT TO MONITOR PER POC.

## 2019-08-30 NOTE — NUR
Pt is not interested in rehab or snf stay. He would like to go directly home
at dc but would be okay with hh referral. [facility] alerted to possible dc over the
holiday wkend.

## 2019-08-31 NOTE — NUR
PATIENT ALERT AND ORIENTED X4. COOPERATIVE AND PLEASANT. SITTING IN TIMBO-CHAIR
THROUGHOUT THE NIGHT. 02NC IN PLACE WITH 4L. SOME SOA WITH EXERTION. VERY [place]
AND LEGALY BLIND. VOIDING [facility] URINE VIA URINAL. THIS NURSE RECEIVED PATIENT
AT APPROX 2300 FROM ANOTHER RN ON THIS FLOOR.  AT HS AND INSULIN GIVEN
PER ORDER. POSITIVE OCCULT STOOL FROM AM SHIFT TODAY. WILL RECEIVE IRON IV X5
DAYS. POSSIBLE D/C ON TUESDAY. WILL MONITOR.

## 2019-08-31 NOTE — NUR
AAOX4. VSS. SR/ST PER TELE. MEDICATED FOR PAIN AS ORDERED. BG ELEVATED ON
SOLUMEDROL, SS AS ORDERED. WIFE AT BEDSIDE. APPETITE BRISK. FREQUENT CHECKS;
WILL CONTINUE TO MONITOR.

## 2019-09-01 NOTE — NUR
NO CHANGES OVERNIGHT. PT A&O, AND SLEPT IN THE RECLINER. PT C/O EARLIER IN THE
NIGHT AND WAS GIVEN OXYCONTIN AND OXYCODONE. PT HAS DENIED PAIN SINCE. PT
REMAINS ON 4L O2 NC. VSS. WILL CONTINUE TO MONITOR.

## 2019-09-02 NOTE — NUR
AAOX4. VERY [place] AND LEGALLY BLIND. MEDICATED FOR CHRONIC BACK PAIN AS ORDERED
WITH GOOD EFFECT. IRON INFUSED AS ORDERED. DOZING IN BEDSIDE CHAIR. WILL
CONTINUE TO FOLLOW.

## 2019-09-02 NOTE — NUR
ASSESSMENTS AS CHARTED. PATIENT DOES NOT SEE CLEARLY AND ACTIONS NEED TO BE
EXPLAINED PRIOR TO PERFORMING ACTIVITY.  PATIENT RESTING IN RECLINER WITH FEET
UP DURING SHIFT.  AMBULATED IN ROOM WITH POOR ENDURANCE. IN SINUS TACH DURING
SHIFT. C/O PAIN IN RIGHT KNEE, SHOULDER AND BACK. ON SCHEDULED PAIN MEDS AND
BREAKTHROUGH PAIN MEDS. FALL PRECAUTIONS IN PLACE.

## 2019-09-03 NOTE — NUR
[facility] AND SHE RECEIVED DC ORDERS AND WILL NOTIFY PT TIME OF VISITS.

## 2019-09-03 NOTE — NUR
patient to dc home today. Dr Vazquez wants patient to work with PT once more
before home. If patient not ready for home then possible stay one more day to
work with therapy. Offered skilled post acute care and patient doesnt want
skilled at [facility] has been there before and not good experience. [facility] accepting
of home with HH.

## 2019-09-03 NOTE — NUR
ASSUMED CARE AT SHIFT CHANGE, PATIENT ALERT, ORIENTED, AND FORGETFUL.
ASSESMENT AS CHARTED, AND VSS. PATIENT STATED THATS HE WANTS TO STAY ANOTHER
DAY TO GET STRONGER, WILL INFORM DR PALOMO AND WILL CONTINUE WITH POC.

## 2019-09-03 NOTE — HC
[facility]
[address]
[place]   [address]                     CONSULTATION                  
_______________________________________________________________________________
 
Name:       EBER AVERY                 Room #:         211-P       ADM IN  
M.R.#:      [id number]                       Account #:      [id number]  
Admission:  08/25/19    Attend Phys:    Yvon Vazquez MD  
Discharge:              Date of Birth:  [birth year]  
                                                          Report #: [id number]
                                                                    [id number]   
_______________________________________________________________________________
THIS REPORT FOR:   //name//                          
 
CC: Dae Duran MD
    _____ _____ 
 
DATE OF SERVICE:  08/30/2019
 
 
REASON FOR CONSULTATION:  Anemia.
 
HISTORY OF PRESENT ILLNESS:  The patient is a very pleasant 74-year-old
gentleman who was admitted back in July.  At that time, he was found to be
severely iron deficient with an iron of 21, TIBC 367, percent saturation 6 and
ferritin of 23.  I do not see Hemoccult.  His urine really had no blood.  He had
described dark stools.  The GI doctor has seen him, were considering future
scope, but the patient had a recent drug-eluting stent and was placed on Plavix
and since he did not have overt bleeding, I think we were leaning towards not
scoping him at this time.  The patient admits to only eating red meat once or
twice per week.  He has been on a proton pump inhibitor for quite some time.  He
does have a history of having anemia before and I think it is back when he had
diverticulosis and had a partial colon resection and reanastomosis by Dr. Robertson
about 12 years ago.
 
The patient recently admitted for shortness of breath and chest discomfort, had
stents placed by Dr. Saud floyd.  The patient has occasional headaches related
to medications.  No swallowing trouble, does have dry mouth.  No new vision
troubles.  Does have trouble hearing.  Does have macular degeneration.  Does
have trouble breathing and coughs quite a bit.  No recent new ankle swelling. 
He had about, by the patient description, about 45-pound intentional weight
loss.  No recent fevers or chills.
 
PAST MEDICAL HISTORY:  Quite extensive and includes the recent coronary artery
disease with drug-eluting stent placement, also COPD, obstructive sleep apnea,
trial of CPAP, but poorly tolerated in the past.  Also, history of hypertension,
hyperlipidemia, chronic kidney disease, possible heart failure, arthritis,
obesity, also some osteoporosis, skin cancers, arthritis, mood disorder, kidney
stones, kyphoplasty, cataracts, I think he has had some knee surgery.
 
FAMILY HISTORY:  Father was an alcoholic.  Mother lived to be age 90.  I think
there were 3 brothers and one sister, no specific health issues.  He has 2
daughters, 1 son.  No specific illnesses.
 
 
 
 
[address]   [address]                     CONSULTATION                  
_______________________________________________________________________________
 
Name:       EBER AVERY Sebastian                 Room #:         [facility] [place]  
M.R.#:      [id number]                       Account #:      [id number]  
Admission:  08/25/19    Attend Phys:    Yvon Vazquez MD  
Discharge:              Date of Birth:  [birth year]  
                                                          Report #: [id number]
                                                                    [id number]   
_______________________________________________________________________________
SOCIAL HISTORY:  He is originally from [place], met his wife I think up in
[place] or [place].  He was in the , specifically air [employer] as a
 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  He
is a former smoker, stopped greater than one year ago.  No significant alcohol,
no street drugs.  He and his wife do have a dog at home with some mixed
Chihuahua and rat terrier.
 
ALLERGIES:  Medicines with troubles include a rash with CODEINE, bumps with his
PIPERACILLIN, TAZOBACTAM cause unspecific difficulties, GABAPENTIN cause mental
status changes.
 
PHYSICAL EXAMINATION:
GENERAL:  The patient appears his stated age.  He was woken up on a Med/Surg
floor.  Mood, he is pleasant, was coughing quite a bit until his lungs cleared.
VITAL SIGNS:  ____, temperature 97.6 orally.
HEENT:  Face is symmetrical.
LUNGS:  Slightly distant, have a few soft rhonchi that finally clear after
coughing quite a bit.
HEART:  Regular rate.
LYMPHATICS:  No enlarged lymph nodes in the supraclavicular, cervical, axillary
or inguinal region.
ABDOMEN:  Quite obese.
EXTREMITIES:  Without clubbing, cyanosis.  There is some edema.
 
LABORATORY REVIEW:  Notable for the low iron back on 07/11/2019.  The patient
did have 3 transfusions, one on 07/11/2019, then one on 08/26/2019, one on
08/28/2019.  Creatinine recently 1.6.  Hemoglobin recently had been 7.2, bumped
with transfusion and then also down to 7.0, has bumped up to 8, currently 8
today.  White count has been in the normal range of 8.2 to 10-12.4 range,
platelets have been in the 293 range, lately.  Note that her differential has
included some abnormal cells including ____ back in 2003, it may have been
reactive in nature.  Kidney functions I mentioned had a recent creatinine of
1.6.  Liver functions normal.  Total bili not done recently, but had been
normal.  UA did not show significant red cells, do not see Hemoccult test.
 
CURRENT MEDICATIONS IN THE HOSPITAL:  Include diltiazem 180 daily, digoxin 0.125
daily, isosorbide 60 daily, furosemide 40 daily IV, potassium chloride 20 mEq
daily, aspirin 81 daily, atorvastatin 40 mg daily, clopidogrel 75 mg daily,
azithromycin 250 daily, ipratropium and albuterol respiratory therapy,
pantoprazole 40 daily, methylprednisolone 40 q.i.d., insulin on a sliding scale,
oxycodone controlled 10 b.i.d., Zoloft 100 at bedtime, Percocet p.r.n., fentanyl
p.r.n., Zofran p.r.n.
 
ASSESSMENT AND PLAN:
1.  Iron deficiency anemia, most likely this is a combination of poor dietary
input, a leak and poor absorption.  Note that his last colonoscopy was in 2007,
 
 
 
[facility]
[address] [place]   [address]                     CONSULTATION                  
_______________________________________________________________________________
 
Name:       EBER AVERY LYNDSAY                 Room #:         [address] [place] [address] [place]  
[facility].#:      [id number]                       Account #:      [id number]  
Admission:  08/25/19    Attend Phys:    Yvon Vazquez MD  
Discharge:              Date of Birth:  [birth year]  
                                                          Report #: [id number]
                                                                    [id number]   
_______________________________________________________________________________
the patient has been seen by GI ____ should consider scoping at some point when
his cardiac is more stable.  We will discontinue oral iron and give the patient
IV iron.  We will also check erythropoietin as the patient has chronic kidney
disease and also check vitamin B12, folate and TSH and peripheral smear.  I
doubt the metamyelocytes mean much, the fact that he has normal white count and
platelet count would argue against a severe bone marrow deficiency, but if
things do not improve, we might need to consider bone marrow biopsy.
2.  Chronic kidney disease.  We will check an EPO level to see if it is
inappropriately low.
3.  Coronary artery disease with recent stents, on Plavix and aspirin.
4.  Hypertension and cardiac issues, diltiazem and a number of other drugs.
5.  Hyperlipidemia, atorvastatin.
6.  Chronic obstructive pulmonary disease, oxygen and aerosol agents.
7.  Obstructive sleep apnea, was intolerant of CPAP/BiPAP in the past.
8.  Oxygen dependency, provide oxygen.
9.  Congestive heart failure issues.  Has diuretics and digoxin on board.
10.  Obesity has intentionally lost 40 pounds.  Encouraged continued weight
loss.
11.  Macular degeneration per others.
12.  Hardness of hearing per others.  We will follow with you.
 
 
 
 
 
 
 
 
 
 
 
 
 
 
 
 
 
 
 
 
 
 
 
 
  <ELECTRONICALLY SIGNED>
   By: Albert Le MD  
  09/03/19     0715
D: 08/30/19 0802                           _____________________________________
T: 08/30/19 1012                           Albert Le MD    /nt

## 2019-09-03 NOTE — NUR
ASSESSMENTS AS CHARTED. PATIENT RESTING IN CHAIR DURING SHIFT. HAD TWO BOWEL
MOVEMENTS DURING SHIFT. C/O PAIN IN JOINTS AND SORE THROAT. ORDERED LOZENGERS.
FALL PRECAUTIONS IN PLACE. PLAN OF CARE IS TO RETURN HOME TODAY WITH HOME
HEALTH.

## 2019-09-04 NOTE — NUR
ASSESSMENTS AS CHARTED. PATIENT IN RECLINER DURING SHIFT. GOT SCRIPT FOR
VOLTAREN GEL FOR HIS RIGHT SHOULDER. FEET ELEVATED TO HELP REDUCE SWELLING. ON
OXYGEN DURING SHIFT. PLAN OF CARE IS TO WORK WITH PT PRIOR TO BEING DISCHARGED
TODAY.

## 2019-09-04 NOTE — NUR
patient to dc home today. Plan home with [facility]. Updated [facility] [place]. they have
rec orders from yesterday no further needs.

## 2019-09-04 NOTE — NUR
ASSUMED CARE AT SHIFT CHANGE, ALERT AND ORIENTED X3 AND FORGETFUL. DENIES ANY
DISCOMFORT AND VSS, SR-ST ON THE MONITOR. REMIANS ON 4L NASAL CONUALA.
DISCHARGE AND MEDICATION INSRUCTIONS GIVEN TO PATIENT AND SPOUSE AND THEY
VERBALIZED UNDERSTANDING.

## 2019-09-07 NOTE — NUR
ASSUMED CARE AT SHIFT CHANGE, ALERT AND ORIENTED X4 AND FORGETFUL. PATIENT C/O
SOB, O2 AST % AND DR CASAREZ NOTIFIED. BREATHING TX ORDERED AND WAS
GIVEN TX AS INDICATED. VSS AND AFEBRILE. SR-ST WITH BBB, AND
WILL CONTINUE WITH POC.

## 2019-09-07 NOTE — NUR
PT WAS AN ER ADMIT AT 0510 WHO COMES TO THE ER WITH CHEST PAIN. PT IS COMES TO
THE UNIT WITH SPOUSE. ADMISSION ASSESSMENT AND EDUCATION COMPLETED. CONSENTS
FORM SIGNED BY SPOUSE. PT IS STABLE. NO SIGN OF DISTRESS NOTED IN PT. PT IS
SLEEPY ANG AND ADMISSION IS COMPLETED WITH SPOUSE. VITAL SIGNS STABLE. FALL
PRECAUTION IN PLACE. NO FURTHER NEEDS REQUESTED AT THIS TIME.

## 2019-09-08 NOTE — NUR
ASSUMED CARE AT SHIFT CHANGE, ALERT AND ORIENTED X4. PATIENT SLEEPING MOST OF
THE DAY, FAMILY AT BEDSIDE AND SHE SPOKE WITH DR PALOMO ABOUT HER FATHER'S
CARE. PATIENT TOOK ALL 6 HOURS TO TAKE MEDICATION. C/O BACK PAIN AND MEDICATED
WITH SCHEDULLED PAIN MEDS. WILL CONTINUE WITH POC.

## 2019-09-08 NOTE — NUR
RECEIVED PT'S CARE AT 1935; PT. ON CHAIR; SLEEPING; SLEEP INTERRUMPTED DURING
SHIFT CHANGE; SPOUSE AT THE BED SIDE; C/O BACK PAIN; DURING ASSESSMENT C/O
BACK PAIN; 5/10; SCHEDULED PAIN MEDICATION GIVEN; EDUCATED ABOUT CALLING
BEFORE STANDING FROM CHAIR; ST. UNDERSTANDING; ST. WANTS TO REST ON CHAIR;
SCDs APPLIED; NO C/O CP; EDUCATED ABOUT CALLING IMMEDIATELY IF CP PRESENTS;
ST. UNDERSTANDING; HS MEDICATION GIVEN; THROUGH THE NIGHT PT. ABLE TO REST
WITH EYES CLOSED; MONITORING; ASSESSMENT AS CHARGED; FOLLOWING POC; WILL PASS
ON REPORT.

## 2019-09-09 NOTE — NUR
ASSUMED PT CARE AT 1900 WITH NO SIGN OF DISTRESS NOTED. PT IS SITTING IN
CHAIR. NO FAMILY AT BEDSIDE. PT IS ALERT AND ORIENTED. RATES PAIN AT A 5. FALL
PRECAUTION IN PLACE. ASSESSMENT COMPLETED AND DOCUMENTED. SCHEDULED MEDS
ADMINISTERED TO PT. NO SIGN OF DISTRESS NOTED. CONITNUE TO MONITOR PATINET.
DENIES ANY FURTHER NEEDS AT THIS TIME.

## 2019-09-09 NOTE — NUR
ASSUMED CARE AT SHIFT CHANGE, ALERT AND ORIENTED X4. VSS, AND SR WITH
BBB.FAMILY AT BEDSIDE ALL DAY. PATIENT BACK FROM CATH LAB, LT GRION SITE DRY
WITH SMALL HEMATOMA, FREQUANT VS STARTED AND REPORT GIVEN SHAHIDA TO CONTINUE
WITH PATIENT CARE.

## 2019-09-09 NOTE — NUR
patient in cath lab. [facility]
patient readmits with CP. Patient lives with spouse in home with wife. He has
stair glide to second floor. He uses home oxygen via [facility]
at 4 liters. PVT duty 2 days a week. Patient has been at [facility] in past
and would not want to return. 5N in process of evaluation they will follow
post cath to determine if candidate for 5N.

## 2019-09-09 NOTE — NUR
PT DOES NOT WANT DINNER AT THIS TIME. TRAY KEPT AT BEDSIDE AND PT AWARE HE CAN
CALL TO HAVE REHEATED.

## 2019-09-09 NOTE — NUR
PT RETURNED POST CATH APPROX 1630. THIS NURSE ASSUMED CARE AT THIS TIME. PT
ALERT AND ORIENTED X4. DENIES PAIN AND SOA. VSS. LEFT GROIN IS C/D/I. POST
CATH PROTOCOL INITIATED. FAMILY AT BEDSIDE AND DENY QUESTIONS OR CONCERNS AT
THIS TIME. NO DISTRESS NOTED.

## 2019-09-10 NOTE — NUR
5N REPORTS NO BED AVAIL FOR PATIENT THEY ARE FULL. PATIENT ON WAIT LIST.
DISCUSSED ACUTE REHAB OPTIONS [facility], [facility],
[facility]. AT THIS TIME REFERRAL TO [facility]. WIFE WANTS TO
DISCUSS WITH DR PALOMO.

## 2019-09-10 NOTE — NUR
ASSUMED CARE AT SHIFT CHANGE, ALERT AND ORIENTED X4. VSS AND SR WITH BBB.
PATIENT SLEPT ON AND OFF ALL DAY, MEDICATED FOR BACK PAIN AS NEEDED. TAKEN
DOWN IR FOR PSEUDOANURYSM THROMBIN INJECTION, TO LT GRION SITE. LT GRION SITE
IS SOFT WITH BRUISES AND PATIENT DENIES ANY DISCOMFORT TO THE SITE. FAMILY AT
BEDSIDE AND WILL CONTINUE WITH POC.

## 2019-09-10 NOTE — NUR
PATIENT SEEN BY MELIDA MAYNARD NP WITH DR. MEDINA. NO 5N BED AVAILABILITY.
SUGGESTED THAT  LOOK INTO OTHER IN PATIENT REHAB OPTIONS. THANK
YOU FOR THIS REFERRAL.

## 2019-09-10 NOTE — NUR
ASSUMED PT CARE AT 1900 WITH NO SIGN OF DISTRESS NOTED. PT IS NOTED TO BE
SLEEPING. PT WAS ON BEDREST TILL 2100 AFTER CARIDAC SITE PROCEDURE. GROIN SITE
IS INTACT. NO BLEEDING OR HEMATOMA OBSERVED. ASSESSMENT COMPLETED AND CHARTED.
SCHEDULED MEDS AMDINISTERED TO PT. NO SIGN OF DISTRESS NOTED. PAIN MED
ADMINISTERED TO PT AS REQUESTED. PT DENIES ANY NEEDS AT THIS TIME. FALL
PRECUATION IN PLACE.

## 2019-09-10 NOTE — NUR
FAXED REFERRAL TO [facility] RECEIVED CONFIRMATION WILL F/U WITH
FACILITY IN THE AM. DCP TO FOLLOW.

## 2019-09-11 NOTE — NUR
RECEIVED PT'S CARE AT 1900; PT. ON BED; RESTING WITH EYES CLOSED; SLEEP
INTERRUPTED TO ASSESS L. SIDE GROIN AREA; AT SHIFT CHANGE L. GROIN AREA SHOWED
BRUISING; PUFFINESS; BANDAID ON; C/O PAIN AT TOUCH; ST. TENDERNESS; DURING
ASSESSMENT C/O BACK PAIN; HS MEDICATION GIVEN; PEDALIS PULSES PRESENTS;
THROUGH THE NIGHT PT. RESTING WITH EYES CLOSED; AT MIDNIGHT NO CHANGE ON L.
GROIN AREA; VS WNL; MONITORING; ASSESSMENT AS CHARGED; FOLLOWING POC; WILL
PASS ON REPORT.

## 2019-09-11 NOTE — NUR
ASSUMED CARE AT [facility] CHANGE, ALERT AND ORIENTED X4. VSS AND SR ON THE
MONITOR. AND UP IN CHAIR.
MEDICATED FOR BACK PAIN AS NEEDED. JERAMY VISITED WITH PATINET AND WIFE, AND
WIFE REQUASTED TO KEEP PATIENT IN THE HOSPITAL FOR ONE MORE NIGHT.
PATIENT WILL SATY ANOTHER NIGHT. AND WILL CONTINUE WITH POC.

## 2019-09-12 NOTE — NUR
Dc to [facility] on hold d/t chest pain and low hgb. Pt getting 1 unit of PRBC and
lasix today. [facility] updated. They will have a bed for the pt tomorrow if he is
stable for dc. Wife aware and here visiting this morning.

## 2019-09-12 NOTE — NUR
RECEIVED PT'S CARE AT 1915; PT. ON CHAIR; SLEEP INTERRUPTED; AOX4; NO C/O
PAIN; DURING ASSESSMENT C/O BACK PAIN AT MOVEMENT; HS MEDICATION GIVEN; ST.
WOULD LIKE TO SLEEP ON CHAIR; VS WNL; SCDs APPLIED; REQUESTED DIFFERENT NC DUE
TO BEHIND EAR IRRITATION; GAUZE APPLIED BEHIND EARS; REQUESTED NC TO RT; PT.
ABLE TO SLEEP THROUGH THE NIGHT WITH EYES CLOSED; MONITORING; ASSESSMENT AS
CHARGED; FOLLOWING POC; WILL PASS ON REPORT.

## 2019-09-12 NOTE — NUR
ASSUMED CARE OF PATIENT AT 0700. ASSESSMENTS DOCUMENTED. PATIENT C/O SOA AND
REQUIRES FREQUEST REST BREAKS. PATIENT UNABLE TO MOVE MORE THAN 2 FEET AT A
TIME USING THE WALKER WITHOUT USING A BREAK AND ONLY ABLE TO STAND FOR 2-3
MINUTES WITHOUT SITTING DOWN. ORTHOSTATIC VITALS OBTAINED AND DOCUMENTED IN
[facility]. PATIENT RECEIVED ONE UNIT OF PACKED RBC'S. TOLERATED TRANSFUSION
WITHOUT ANY SIDE EFFECTS. LEFT AC IV INFILTRATED DURING TRANSFUSION AND
CONTINUED IN NEW RIGHT CHEST SITE IV. LBM: 9/6/19, ACTIVE BS, POSITIVE
FLATUS AND DENIES
DISCOMFORT. REPEAT CBC OBTAINED. PATIENT TO CONTINUE WITH POC.

## 2019-09-13 NOTE — NUR
Pt dcing to [facility] today. They have arranged for a w/c van with o2 btwn 1 and
2pm. Pt's dtr is at bedside and updated. Care team updated. Pt's dtr is
letting her mom know as she will be bringing in a change of clothing for him.
Chart copy to be updated. Nursing to call report.

## 2019-09-13 NOTE — NUR
GAVE REPORT TO WALTER AT [facility], IV AND TELE WILL BE REMOVED, PT'S SPOUSE
AT BEDSIDE. PT WILL BE TAKEN OUT VIA FACILITY TRANSPORT, ALL SUPPLIES W/PT AND
CHART FOLDER IN [place] [employer]

## 2019-09-13 NOTE — NUR
RECEIVED PT'S CARE AT 1935; PT. ON BED; AWAKE; SPOUSE AT THE BED SIDE; NO C/O
PAIN; DURING ASSESSMENT ST. BACK PAIN; 2/10; EDUCATED ABOUT NOTIFIED
IMMEDIATELY IF FEELING SOB; EDUCATED ABOUT HAVING HS PILLS WITH YOGURT OR
PUDDIN DUE TO COUGHIN EARLIER AFTER HAVING PILLS; ST. UNDERSTANDING; PT.
COUGHING DURING SHIFT CHANGE & ASSESSMENT; BEFORE GIVEN HS MEDICATIONS SMALL
AMOUNT OF YOGURT GIVEN; PT. ABLE TO SWALLOW; MEDICATION GIVEN; THROUGH THE
NIGHT ABLE TO REST WITH EYES CLOSED; SBP RE-ASSESSMENT AT 0400; WNL; NO C/O
HEADACHE; NO C/O SOB; REMAINED THE IMPORTANCE OF TURNING GIVEN SOME REST TO
BUTTOCKS; ST. UNDERSTANDING; SYNUS RYTHM THROUGH THE NIGHT; CHECK CHARTING;
ASSESSMENT AS CHARGED; FOLLOWING POC; WILL PASS ON REPORT.

## 2019-10-07 ENCOUNTER — HOSPITAL ENCOUNTER (INPATIENT)
Dept: HOSPITAL 35 - ER | Age: 74
LOS: 2 days | Discharge: HOSPICE | DRG: 280 | End: 2019-10-09
Attending: FAMILY MEDICINE | Admitting: FAMILY MEDICINE
Payer: COMMERCIAL

## 2019-10-07 VITALS — SYSTOLIC BLOOD PRESSURE: 139 MMHG | DIASTOLIC BLOOD PRESSURE: 65 MMHG

## 2019-10-07 VITALS — DIASTOLIC BLOOD PRESSURE: 65 MMHG | SYSTOLIC BLOOD PRESSURE: 131 MMHG

## 2019-10-07 VITALS — HEIGHT: 64 IN | WEIGHT: 150 LBS | BODY MASS INDEX: 25.61 KG/M2

## 2019-10-07 VITALS — SYSTOLIC BLOOD PRESSURE: 139 MMHG | DIASTOLIC BLOOD PRESSURE: 68 MMHG

## 2019-10-07 VITALS — SYSTOLIC BLOOD PRESSURE: 126 MMHG | DIASTOLIC BLOOD PRESSURE: 62 MMHG

## 2019-10-07 DIAGNOSIS — F32.9: ICD-10-CM

## 2019-10-07 DIAGNOSIS — E11.22: ICD-10-CM

## 2019-10-07 DIAGNOSIS — I50.32: ICD-10-CM

## 2019-10-07 DIAGNOSIS — Z88.6: ICD-10-CM

## 2019-10-07 DIAGNOSIS — Z23: ICD-10-CM

## 2019-10-07 DIAGNOSIS — Z95.5: ICD-10-CM

## 2019-10-07 DIAGNOSIS — Z79.52: ICD-10-CM

## 2019-10-07 DIAGNOSIS — I25.2: ICD-10-CM

## 2019-10-07 DIAGNOSIS — Z96.659: ICD-10-CM

## 2019-10-07 DIAGNOSIS — I13.0: ICD-10-CM

## 2019-10-07 DIAGNOSIS — Z85.828: ICD-10-CM

## 2019-10-07 DIAGNOSIS — Z90.89: ICD-10-CM

## 2019-10-07 DIAGNOSIS — J96.90: ICD-10-CM

## 2019-10-07 DIAGNOSIS — J45.909: ICD-10-CM

## 2019-10-07 DIAGNOSIS — E78.5: ICD-10-CM

## 2019-10-07 DIAGNOSIS — Z98.42: ICD-10-CM

## 2019-10-07 DIAGNOSIS — Z93.3: ICD-10-CM

## 2019-10-07 DIAGNOSIS — Z87.01: ICD-10-CM

## 2019-10-07 DIAGNOSIS — Z79.899: ICD-10-CM

## 2019-10-07 DIAGNOSIS — I25.119: ICD-10-CM

## 2019-10-07 DIAGNOSIS — M54.9: ICD-10-CM

## 2019-10-07 DIAGNOSIS — Z87.442: ICD-10-CM

## 2019-10-07 DIAGNOSIS — J44.9: ICD-10-CM

## 2019-10-07 DIAGNOSIS — M19.90: ICD-10-CM

## 2019-10-07 DIAGNOSIS — Z98.52: ICD-10-CM

## 2019-10-07 DIAGNOSIS — Z98.41: ICD-10-CM

## 2019-10-07 DIAGNOSIS — M81.0: ICD-10-CM

## 2019-10-07 DIAGNOSIS — K21.9: ICD-10-CM

## 2019-10-07 DIAGNOSIS — N18.9: ICD-10-CM

## 2019-10-07 DIAGNOSIS — Z87.891: ICD-10-CM

## 2019-10-07 DIAGNOSIS — Z88.8: ICD-10-CM

## 2019-10-07 DIAGNOSIS — I21.4: Primary | ICD-10-CM

## 2019-10-07 DIAGNOSIS — G47.33: ICD-10-CM

## 2019-10-07 DIAGNOSIS — Z99.81: ICD-10-CM

## 2019-10-07 DIAGNOSIS — H54.8: ICD-10-CM

## 2019-10-07 DIAGNOSIS — R79.89: ICD-10-CM

## 2019-10-07 DIAGNOSIS — G89.29: ICD-10-CM

## 2019-10-07 DIAGNOSIS — J06.9: ICD-10-CM

## 2019-10-07 LAB
ALBUMIN SERPL-MCNC: 2.7 G/DL (ref 3.4–5)
ALT SERPL-CCNC: 17 U/L (ref 30–65)
ANION GAP SERPL CALC-SCNC: 10 MMOL/L (ref 7–16)
APTT BLD: 26.3 SECONDS (ref 24.5–32.8)
AST SERPL-CCNC: 22 U/L (ref 15–37)
BASOPHILS NFR BLD AUTO: 0.4 % (ref 0–2)
BILIRUB DIRECT SERPL-MCNC: 0.1 MG/DL
BILIRUB SERPL-MCNC: 0.3 MG/DL
BUN SERPL-MCNC: 6 MG/DL (ref 7–18)
CALCIUM SERPL-MCNC: 8.6 MG/DL (ref 8.5–10.1)
CHLORIDE SERPL-SCNC: 102 MMOL/L (ref 98–107)
CO2 SERPL-SCNC: 27 MMOL/L (ref 21–32)
CREAT SERPL-MCNC: 1.1 MG/DL (ref 0.7–1.3)
EOSINOPHIL NFR BLD: 0.7 % (ref 0–3)
ERYTHROCYTE [DISTWIDTH] IN BLOOD BY AUTOMATED COUNT: 18.3 % (ref 10.5–14.5)
GLUCOSE SERPL-MCNC: 184 MG/DL (ref 74–106)
GRANULOCYTES NFR BLD MANUAL: 92.4 % (ref 36–66)
HCT VFR BLD CALC: 28.7 % (ref 42–52)
HGB BLD-MCNC: 9.3 GM/DL (ref 14–18)
INR PPP: 1
LYMPHOCYTES NFR BLD AUTO: 4.1 % (ref 24–44)
MCH RBC QN AUTO: 29.4 PG (ref 26–34)
MCHC RBC AUTO-ENTMCNC: 32.4 G/DL (ref 28–37)
MCV RBC: 90.8 FL (ref 80–100)
MONOCYTES NFR BLD: 2.4 % (ref 1–8)
NEUTROPHILS # BLD: 8.4 THOU/UL (ref 1.4–8.2)
PLATELET # BLD: 304 THOU/UL (ref 150–400)
POTASSIUM SERPL-SCNC: 3.6 MMOL/L (ref 3.5–5.1)
PROT SERPL-MCNC: 6 G/DL (ref 6.4–8.2)
PROTHROMBIN TIME: 10.2 SECONDS (ref 9.3–11.4)
RBC # BLD AUTO: 3.16 MIL/UL (ref 4.5–6)
SODIUM SERPL-SCNC: 139 MMOL/L (ref 136–145)
TROPONIN I SERPL-MCNC: <0.06 NG/ML (ref ?–0.06)
WBC # BLD AUTO: 9.1 THOU/UL (ref 4–11)

## 2019-10-07 PROCEDURE — 10081 I&D PILONIDAL CYST COMP: CPT

## 2019-10-07 NOTE — EMS
[address]   [address]                     EMS Patient Care Report       
_______________________________________________________________________________
 
Name:       eber avery                     Room #:         213-P       ADM IN  
M.R.#:      [id number]                       Account #:      [id number]  
Admission:  10/07/19    Attend Phys:    Yvon Vazquez MD  
Discharge:              Date of Birth:  [birth year]  
                                                          Report #: [id number]
                                                                    [id number]
_______________________________________________________________________________
THIS REPORT FOR:   //name//                          
 
Report Transmitted: 10/07/2019 22:33
EMS Care Summary
[facility]
Incident [id number] @ 10/07/2019 16:45
 
Incident Location
[address] [place] [address]
 
Patient
EBER AVERY
Male, 74 Years
 1945
 
Patient Address
[address] [place] [address]
 
Patient History
Congestive Heart Failure (CHF),Chronic Obstructive Pulmonary Disease 
(COPD),Hypertension, 
 
Patient Allergies
Codeine,
 
Patient Medications
Albuterol, Naproxen, Torsemide, Oxygen, Oxycodone, Voltaren, Atorvastatin, 
Sertraline, Prednisone, Nitroglycerin, 
 
Chief Complaint
"I have chest pain"
 
Disposition
Transported No Lights/[place]
 
Dispatch Reason
Chest Pain (Non-Traumatic)
 
Transported To
[facility]
 
Narrative
74yom found sitting upright in recliner at home, alert and oriented, and 
appearing comfortable. Pt was in care of Amy SMITH and wife. Pt stated approx 
 
 
 
[address]   [address]                     EMS Patient Care Report       
_______________________________________________________________________________
 
Name:       eber avery                     Room #:         213-P       ADM IN  
M.THOM#:      [id number]                       Account #:      [id number]  
Admission:  10/07/19    Attend Phys:    Yvon Vazquez MD  
Discharge:              Date of Birth:  [birth year]  
                                                          Report #: [id number]
                                                                    [id number]
_______________________________________________________________________________
"45 minutes ago" he developed "sudden chest pressure" with pain rating at 
"8/10," which went unchanged after he took "3 Nitroglycerin" tablets. Pt 
activated 911 because his pain was unchanged after self care. Pt reported that 
the pain occurred while he was at rest sitting in his chair. Pt denied change 
in pain/pressure with palpation, deep inspiration, or movement. Pt denied pain 
radiating, denied shortness of breath, N/V/D, denied diaphoresis, but did state 
he has felt "weak" all day. Pt reported today he went to his PCP due to the 
weakness and was discharged without diagnosis. 
 
Pt reported having a "heart attack" approx 1 month ago where he had splints 
placed at [facility]. Pt stated this pain was "similar" to that episode. 
Pt was unsure of any significant changes to his post heart attack ECG. ECG was 
obtained on scene which appear to show a Sinus Tach rhythm with RBBB. EMS 
monitor noted "Acute Inferior MI," but there was minimal changes to ST in III, 
aVF. EMS informed [facility] of findings and faxed 12 Lead to ER prior to departure 
of scene. 
 
Pt was able to stand and rotate to wheelchair which was utilized to move pt 
outside front door of residence to awaiting cot in the driveway. Pt was secured 
with straps x3 and moved to ambulance for further evaluation and treatment. IV 
was established on second attempt prior to departure. Pt informed EMS his pain 
was not 0/10 upon departure. Hospital notified with pt information only. Pt 
remained alert and oriented throughout EMS care. Upon arrival to ER, pt alert 
and oriented, remaining pain free and reports no complaint. Pt care was 
transferred to ER staff without incident. 
 
Initial Vitals
@17:13P: 105,BP: 141/79,SpO2: 98,
@17:20P: 106,Pain: 0/10,SpO2: 98,MI Suspected: false
@17:25P: 105,R: 16,BP: 123/59,Pain: 0/10,GCS: 15,SpO2: 98,Revised Trauma: 12,
@16:59P: 100,R: 16,BP: 152/73,Pain: 8/10,GCS: 15,SpO2: 98,Revised Trauma: 12,MI 
Suspected: false 
@16:56P: 102,R: 16,BP: 144/69,Pain: 8/10,GCS: 15,SpO2: 97,Revised Trauma: 12,MI 
Suspected: false 
 
Assessments
@17:00MENTAL:Person Oriented,Time Oriented,Place Oriented,Event 
Oriented,SKIN:HEENT:Head/Face: No Abnormalities,LUNG SOUNDS:General: No 
Abnormalities,ABDOMEN:General: No Abnormalities,PELVIS//GI:No 
Abnormalities,EXTREMITIES:Left Arm: No Abnormalities,Right Arm: No 
Abnormalities,Left Leg: No Abnormalities,Right Leg: No 
Abnormalities,PULSE:Radial: 2+ Normal,NEURO:No Abnormalities, 
 
Impression
Chest Pain / Discomfort
 
 
 
 
[facility]
[address]
[place]   [address]                     EMS Patient Care Report       
_______________________________________________________________________________
 
Name:       eber avery                     Room #:         213-P       ADM IN  
M.R.#:      [id number]                       Account #:      [id number]  
Admission:  10/07/19    Attend Phys:    Yvon Vazquez MD  
Discharge:              Date of Birth:  [birth year]  
                                                          Report #: [id number]
                                                                    [id number]
_______________________________________________________________________________
Procedures
@17:2012-Lead ECGResponse: UnchangedSucceeded@16:5912-Lead ECGResponse: 
UnchangedSucceeded@17:13Saline Lock 0cc (20 ga) Site: 
Antecubital-RightResponse: UnchangedFailed@17:15Saline Lock 10cc (20 ga) Site: 
Antecubital-RightResponse: UnchangedSucceeded@PTAAspirin - 324 Milligrams (mg) 
- OralResponse: Unchanged 
 
Timeline
PTA,Aspirin - 324 Milligrams (mg) - Oral,Response: Unchanged
16:44,Call Received
16:44,Psap Call
16:45,Dispatched
16:46,En Route
16:55,On Scene
16:56,At Patient
16:56,BP: 144/69 M,PULSE: 102,RR: 16 R,SPO2: 97 Ox,ETCO2:  ,BG: ,PAIN: 8,GCS: 
15, 
16:59,12-Lead ECG,Response: UnchangedSucceeded,
16:59,BP: 152/73 M,PULSE: 100,RR: 16 R,SPO2: 98 Ox,ETCO2:  ,BG: ,PAIN: 8,GCS: 
15, 
17:13,Saline Lock 0cc 20 ga Site: Antecubital-Right,Response: UnchangedFailed,
17:13,BP: 141/79 M,PULSE: 105,RR:  R,SPO2: 98 Ox,ETCO2:  ,BG: ,PAIN: ,GCS: ,
17:15,Saline Lock 10cc 20 ga Site: Antecubital-Right,Response: 
UnchangedSucceeded, 
17:16,Depart Scene
17:20,12-Lead ECG,Response: UnchangedSucceeded,
17:20,BP: / M,PULSE: 106,RR:  R,SPO2: 98 Ox,ETCO2:  ,BG: ,PAIN: 0,GCS: ,
17:24,At Destination
17:25,BP: 123/59 M,PULSE: 105,RR: 16 R,SPO2: 98 Ox,ETCO2:  ,BG: ,PAIN: 0,GCS: 
15, 
17:53,Call Closed
 
Disclaimer
v1.1     Copyright 2019 [employer]
This EMS Care Summary contains data elements from the applicable legal record 
(which may be displayed differently). It is designed to provide pertinent 
information for the following purposes: continuity of care, clinical quality, 
and state data reporting. The complete legal record is available to ED staff 
and administrators of the receiving hospital in [employer]'s Patient Tracker. All data 
is provided "as is."

## 2019-10-08 VITALS — DIASTOLIC BLOOD PRESSURE: 57 MMHG | SYSTOLIC BLOOD PRESSURE: 120 MMHG

## 2019-10-08 VITALS — SYSTOLIC BLOOD PRESSURE: 150 MMHG | DIASTOLIC BLOOD PRESSURE: 59 MMHG

## 2019-10-08 VITALS — DIASTOLIC BLOOD PRESSURE: 53 MMHG | SYSTOLIC BLOOD PRESSURE: 107 MMHG

## 2019-10-08 VITALS — SYSTOLIC BLOOD PRESSURE: 143 MMHG | DIASTOLIC BLOOD PRESSURE: 71 MMHG

## 2019-10-08 VITALS — DIASTOLIC BLOOD PRESSURE: 60 MMHG | SYSTOLIC BLOOD PRESSURE: 130 MMHG

## 2019-10-08 VITALS — SYSTOLIC BLOOD PRESSURE: 110 MMHG | DIASTOLIC BLOOD PRESSURE: 61 MMHG

## 2019-10-08 PROCEDURE — 3E0234Z INTRODUCTION OF SERUM, TOXOID AND VACCINE INTO MUSCLE, PERCUTANEOUS APPROACH: ICD-10-PCS | Performed by: FAMILY MEDICINE

## 2019-10-08 NOTE — NUR
ASSUMED CARE AT SHIFT CHANGE, ALERT AND ORIENTED X4. DENIES ANY CP, MEDICATED
FOR LOWER BACK, NECK AND RT SHOULDER PAIN. VSS AND AFEBRILE. WILL CONTINUE
WITH POC.

## 2019-10-08 NOTE — NUR
[facility] IN [facility]TAKE SHE RECEIVED
REFERRAL AND WILL REVIEW. DP TO FOLLOW.

## 2019-10-08 NOTE — NUR
met with patient who is A/Ox4 admits with CP. Patient with recent dc 9/13/19
to [facility]. He reports he was there approx a week, he reports the team came to
his room and reported they do not have a program for him. Patient reports
there were times when he did not do therapy and stomach issues. Patient dc
home and rec HH via [facility] and also [facility]. Patient has
home oxygen via [facility]. He has pvt dty 2x a week to assist
wife. He lives in independent home with wife. Patient to dc home with HH care
and cont private dty. Notified HH and [facility] patient admitted to
hospital. Cont to follow for dc planning.

## 2019-10-08 NOTE — EKG
[address]
[place]  [address]
Phone:  [phone]                    ELECTROCARDIOGRAM REPORT      
_______________________________________________________________________________
 
Name:       cindy subramanian                     Room #:         213-P       ADM IN  
M.R.#:      [id number]     Account #:      [id number]  
Admission:  10/07/19    Attend Phys:    Yvon Vazquez MD  
Discharge:              Date of Birth:  [birth year]  
                                                          Report #: [id number]
   [id number]
_______________________________________________________________________________
THIS REPORT FOR:   //name//                          
 
                         [facility] ED
                                       
Test Date:    2019-10-07               Test Time:    17:33:47
Pat Name:     cindy subramanian                Department:   
Patient ID:   [id number]            Room:         213
Gender:       M                        Technician:   JORGE
:          1945               Requested By: Sandra Teague
Order Number: [id number] MD:   Trevor Hendrix
                                 Measurements
Intervals                              Axis          
Rate:         103                      P:            35
MN:           176                      QRS:          -91
QRSD:         148                      T:            79
QT:           380                                    
QTc:          498                                    
                           Interpretive Statements
Sinus tachycardia
RBBB and LAFB
Compared to ECG 09/10/2019 07:30:32
no significant change was found
Electronically Signed On 10-8-2019 8:00:17 CDT by Trevor Hendrix
[email or web address]
 
 
 
 
 
 
 
 
 
 
 
 
 
 
 
 
 
 
 
  <ELECTRONICALLY SIGNED>
   By: Trevor Hendrix MD, [facility]   
  10/08/19     0800
D: 10/07/19 1733                           _____________________________________
T: 10/07/19 1733                           Trevor Hendrix MD, [facility]     /EPI

## 2019-10-09 VITALS — DIASTOLIC BLOOD PRESSURE: 46 MMHG | SYSTOLIC BLOOD PRESSURE: 97 MMHG

## 2019-10-09 VITALS — SYSTOLIC BLOOD PRESSURE: 143 MMHG | DIASTOLIC BLOOD PRESSURE: 71 MMHG

## 2019-10-09 VITALS — SYSTOLIC BLOOD PRESSURE: 124 MMHG | DIASTOLIC BLOOD PRESSURE: 56 MMHG

## 2019-10-09 VITALS — DIASTOLIC BLOOD PRESSURE: 61 MMHG | SYSTOLIC BLOOD PRESSURE: 106 MMHG

## 2019-10-09 NOTE — NUR
ASSUMED PT CARE AT 1900. VSS. PT A&OX4. COMPLAINED OF GENERALIZED PAIN,
SCHEDULED OXYCOTIN GIVEN. GENERALIZED EDEMA, REMAINS ON 3L NC. PT
IS STABLE, TROP TRENDNG DOWN, WILL CONTINUE TO MONITOR PER POC.

## 2019-10-09 NOTE — NUR
patient to dc home. Sp with  [facility] who [facility] and they both [facility] ins for HH care. Chrissy with pallative care
reports they will be adding therapy for patient in the home. Updated patient
and wife. Wife aware of dc today and brought oxygen for trip home. They both
discussed prev stay at [facility]. Report patient could not participate fully with
therapy and he was likely dc from [facility] too soon. He rec blood transfusions at
[facility] and at [facility] from facility they reported to patient "they dont have a
program to fit his needs." Patient to dc home today with  [facility] care
program.

## 2019-10-09 NOTE — NUR
ASSUMED CARE AT SHIFT CHANGE, ALERT AND ORIENTED X4 BUT FORGETFUL. ASSESMENT
AS CHARTED. VSS, AND SR ON THE MONITOR. DISCHARGE AND MEDICATION INSTRUCTIONS
GIVEN TO PATIENT AND SPOUSE AND PATIENT DISCHARGED HOME.

## 2019-10-09 NOTE — NUR
FAXED DC ORDERS/SUMMARY [facility]  [facility] SPOKE WITH GIOVANNA IN
ADM SHE RECEIVED DC ORDERS.
[facility] SHE RECEIVED DC ORDERS
AND WILL NOTIFY PT TIME OF VISITS.

## 2019-11-07 ENCOUNTER — HOSPITAL ENCOUNTER (OUTPATIENT)
Dept: HOSPITAL 35 - PAIN | Age: 74
End: 2019-11-07
Attending: CLINICAL NURSE SPECIALIST
Payer: COMMERCIAL

## 2019-11-07 VITALS — SYSTOLIC BLOOD PRESSURE: 139 MMHG | DIASTOLIC BLOOD PRESSURE: 51 MMHG

## 2019-11-07 DIAGNOSIS — Z86.74: ICD-10-CM

## 2019-11-07 DIAGNOSIS — Z88.8: ICD-10-CM

## 2019-11-07 DIAGNOSIS — J44.9: ICD-10-CM

## 2019-11-07 DIAGNOSIS — M81.0: ICD-10-CM

## 2019-11-07 DIAGNOSIS — M19.90: ICD-10-CM

## 2019-11-07 DIAGNOSIS — Z79.891: ICD-10-CM

## 2019-11-07 DIAGNOSIS — E11.9: ICD-10-CM

## 2019-11-07 DIAGNOSIS — Z95.818: ICD-10-CM

## 2019-11-07 DIAGNOSIS — Z88.5: ICD-10-CM

## 2019-11-07 DIAGNOSIS — Z79.899: ICD-10-CM

## 2019-11-07 DIAGNOSIS — M47.816: Primary | ICD-10-CM

## 2019-11-07 DIAGNOSIS — G89.29: ICD-10-CM

## 2019-11-07 NOTE — NUR
Pain Clinic Assessment:
 
1. History of Osteoarthritis:
ALL OVER
   History of Rheumatoid Arthritis:
Not Applicable
 
2. Height:  ft.  in.  cm.
   Weight:  lb.  oz.  kg.
   Patient's BMI:
 
3. Vital Signs:
   BP:  Pulse:  Resp:
   Temp:  02 Sat:  ECG Mon:
 
4. Pain Intensity: 5
 
5. Fall Risk:
   Dizziness: N  Needs help standing or walking: N
   Fallen in the last 3 months: N
   Fall risk comments:
 
 
6. Patient on Blood Thinner: None
 
7. History of Hypertension: Y
 
8. Opioid Therapy greater than 6 weeks: Y
   Opiate Contract Signed: 08/15/16
 
9. Risk Assessment Tool Provided: MOD
 
10. Functional Assessment Tool: 6/70
 
11. Recreational Drug Use: Never Drug Type:
    Tobacco Use: Heavy Tobacco Smoker Tobacco Type:
       Amount or Packs/day:  How Many Years:
    Alcohol Use: No  Frequency:  Quant:

## 2019-11-07 NOTE — NUR
Pain Clinic Assessment:
 
1. History of Osteoarthritis:
ALL OVER
   History of Rheumatoid Arthritis:
Not Applicable
 
2. Height:  ft.  in.  cm.
   Weight:  lb.  oz.  kg.
   Patient's BMI:
 
3. Vital Signs:
   BP:  Pulse:  Resp:
   Temp:  02 Sat:  ECG Mon:
 
4. Pain Intensity: 2-3
 
5. Fall Risk:
   Dizziness:   Needs help standing or walking:
   Fallen in the last 3 months:
   Fall risk comments:
 
 
6. Patient on Blood Thinner: None
 
7. History of Hypertension: Y
 
8. Opioid Therapy greater than 6 weeks: Y
   Opiate Contract Signed: 08/15/16
 
9. Risk Assessment Tool Provided: MOD
 
10. Functional Assessment Tool: 6/70
 
11. Recreational Drug Use: Never Drug Type:
    Tobacco Use: Heavy Tobacco Smoker Tobacco Type:
       Amount or Packs/day:  How Many Years:
    Alcohol Use: No  Frequency:  Quant:

## 2019-11-11 NOTE — HPC
[facility]
[address]
[place]   [address]                     PAIN MANAGEMENT CONSULTATION  
_______________________________________________________________________________
 
Name:       cindy subramanian                     Room #:                     REG HERLINDA 
JAMAAL#:      [id number]                       Account #:      [id number]  
Admission:  11/07/19    Attend Phys:    Nancy Brown     
Discharge:              Date of Birth:  [birth year]  
                                                          Report #: [id number]
                                                                    [id number]   
_______________________________________________________________________________
THIS REPORT FOR:   //name//                          
 
CC: Nancy Randolph MD
 
DATE OF SERVICE:  11/07/2019
 
 
CHIEF COMPLAINT:  Low back pain with spondylosis and osteoarthritis.
 
HISTORY OF PRESENT ILLNESS:  This is a very pleasant 74-year-old gentleman who
returns to the pain clinic after several months of absence because he has been
in the hospital in a rehab facility.  He had a heart attack, had stents placed,
and was deconditioned.  He did go to the rehab facility where he spent a
significant amount of time.  He reports he has been home about a month now. 
While he was in the rehab facility, they did take him off all of his narcotics. 
He was having some hallucinations and they associated it with his opioids.  The
patient and family members report that once he returned home, he did start his
oxycodone 5/325 again, instead of the Tylenol Extra Strength that they have been
giving him at the rehab facility.  Then, after a week or two, he did restart his
OxyContin at 10 mg twice a day.  He is here today for refills since he is out of
his oxycodone rating his pain score at a 5/10.
 
The patient's pain is in his right shoulder, bilateral wrists, and knees and his
midthoracic back.  It is an aching feeling, worse with standing and lifting his
arms.  He feels like sitting and using his medication are beneficial.  He denies
any problems with constipation.  He is on oxygen today, though is having less
short of breath that he had at previous visits to our clinic.  His wife and
caregiver are here present as well today.
 
ALLERGIES:  CODEINE, PIPERACILLIN, GABAPENTIN, AND TAZOBACTAM.
 
CURRENT LIST OF MEDICATIONS:  Percocet 5/325 p.r.n., iron, aspirin, Plavix 75 mg
daily, Zoloft 100 mg daily, potassium 20 mEq daily, Imdur 120 mg daily,
OxyContin 10 mg b.i.d., Demadex 20 mg daily, Voltaren gel as needed, Lipitor 40
mg daily, Protonix 40 mg daily, and supplemental oxygen.
 
PQRS:
1.  He has lost significant diffuse osteoarthritis throughout his upper and
lower extremities, and spine.  Denies any rheumatoid arthritis.
2.  Height is 5 feet 4 inches.
3.  Vital signs:  Blood pressure 139/51, pulse 110, respirations 18, oxygen sat
is 93 on 4 liters of nasal cannula.
4.  The patient's pain score is 5/10.
 
 
 
[address]   [address]                     PAIN MANAGEMENT CONSULTATION  
_______________________________________________________________________________
 
Name:       cindy subramanian                     Room #:                     [facility]#:      [id number]                       Account #:      [id number]  
Admission:  11/07/19    Attend Phys:    Nancy Brown     
Discharge:              Date of Birth:  [birth year]  
                                                          Report #: [id number]
                                                                    [id number]   
_______________________________________________________________________________
5.  Denies dizziness.  He needs help walking.  He is in a wheelchair today.  He
has not fallen in the last 3 months.
6.  The patient is on Plavix and he does take medicine for hypertension.
7.  Opiate therapy is greater than 6 weeks; therefore, an opioid signed contract
is on the chart.  His risk assessment tool is moderate.  His functional
assessment is 6/7.
8.  Recreational drug use, he denies.  He is a former smoker and does not drink
alcohol.
 
According to the prescription monitoring system, he last filled his pain
medications on 10/15/2019.  They do safeguard their medications, they have a
safe at home.
 
PHYSICAL EXAMINATION:
GENERAL:  This is an alert and oriented, 74-year-old, rating his pain score at
5/10 today.  His affect is appropriate.  There are no signs of overmedication.
HEENT:  Normocephalic, atraumatic.  Extraocular eye muscles are intact.  Hearing
is decreased.  He is wearing a nasal cannula.  He has difficulty with vision due
to macular degeneration.
MUSCULOSKELETAL:  The patient moves from sitting to standing very slowly.  He is
in a wheelchair today.  He has tenderness in his right shoulder and bilateral
wrists, worse with movement, pain in his midthoracic region as well as
tenderness along his spine.  He is deconditioned in his upper and lower
extremities.
 
IMPRESSION:
1.  Severe chronic obstructive pulmonary disease with supplemental oxygen.
2.  Recent heart attack with stents, on anticoagulation therapy.
3.  Chronic low back pain with spondylosis and spinal stenosis.
4.  Osteoarthritis.
5.  Type 2 diabetic.
6.  Osteoporosis.
7.  Decreased vision due to ocular complications of macular degeneration and
cataracts.
8.  Management of high risk medications under terms of written opioid agreement.
 
PLAN:
1.  We discussed treatment options with the patient today.  While the patient
was in the rehab facility, he was weaned off his medications, and did go through
withdrawal symptoms, it does sound like, and was tolerating and controlling his
pain with only Tylenol Extra Strength.  Upon arrival home,  he has restarted his
Percocet and then gradually his oxycodone.  I explained to him our hope is to
have him take the lowest most effective dose.  He reports he was able to get by
with just his oxycodone several times a day.  After much discussion, it was
decided for him to continue at oxycodone 5/325 three tablets a day with
occasional 4 and to discontinue his OxyContin currently.  The patient and family
 
 
 
[facility]
[address]
[place]   [address]                     PAIN MANAGEMENT CONSULTATION  
_______________________________________________________________________________
 
Name:       cindy subramanian                     Room #:                     REG DIANEDONALD HINTON#:      [id number]                       Account #:      [id number]  
Admission:  11/07/19    Attend Phys:    Nancy Brown     
Discharge:              Date of Birth:  [birth year]  
                                                          Report #: [id number]
                                                                    [id number]   
_______________________________________________________________________________
are agreeable with this plan of care.
2.  Scripts given today for #100 pills of Percocet 5/325 for today and 4-week
release.  These were transmitted electronically to [facility].
3.  The patient was instructed to call us if this is not controlling his pain
and we will discuss treatment options, whether starting OxyContin again or
increasing him to 4 tablets every day.  The patient verbalizes understanding.
4.  Dr. Albert Randolph did come and collaborate care today and saw the patient
as well.  The patient will call if needed or return in 2 months for followup.
 
 
 
 
 
 
 
 
 
 
 
 
 
 
 
 
 
 
 
 
 
 
 
 
 
 
 
 
 
 
 
 
 
 
 
 
  <ELECTRONICALLY SIGNED>
   By: Nancy Brown             
  11/11/19     0752
D: 11/07/19 1421                           _____________________________________
T: 11/07/19 1822                           Nancy Brown               /nt

## 2019-11-19 PROBLEM — L82.1 OTHER SEBORRHEIC KERATOSIS: Status: ACTIVE | Noted: 2019-01-01

## 2019-11-19 PROBLEM — L57.0 ACTINIC KERATOSIS: Status: ACTIVE | Noted: 2019-01-01

## 2019-11-19 PROBLEM — Z85.820 PERSONAL HISTORY OF MALIGNANT MELANOMA OF SKIN: Status: ACTIVE | Noted: 2019-01-01

## 2019-11-19 NOTE — PROCEDURE: MIPS QUALITY
Quality 137: Melanoma: Continuity Of Care - Recall System: Patient information entered into a recall system that includes: target date for the next exam specified AND a process to follow up with patients regarding missed or unscheduled appointments
Quality 111:Pneumonia Vaccination Status For Older Adults: Pneumococcal Vaccination Previously Received
Detail Level: Detailed
Quality 110: Preventive Care And Screening: Influenza Immunization: Influenza Immunization Administered during Influenza season

## 2020-01-01 ENCOUNTER — APPOINTMENT (RX ONLY)
Dept: URBAN - METROPOLITAN AREA SURGERY CENTER 10 | Facility: SURGERY CENTER | Age: 75
Setting detail: DERMATOLOGY
End: 2020-01-01

## 2020-01-01 ENCOUNTER — APPOINTMENT (RX ONLY)
Dept: URBAN - METROPOLITAN AREA CLINIC 141 | Facility: CLINIC | Age: 75
Setting detail: DERMATOLOGY
End: 2020-01-01

## 2020-01-01 ENCOUNTER — APPOINTMENT (RX ONLY)
Dept: URBAN - METROPOLITAN AREA CLINIC 39 | Facility: CLINIC | Age: 75
Setting detail: DERMATOLOGY
End: 2020-01-01

## 2020-01-01 VITALS
WEIGHT: 170 LBS | HEART RATE: 92 BPM | DIASTOLIC BLOOD PRESSURE: 59 MMHG | HEIGHT: 64 IN | SYSTOLIC BLOOD PRESSURE: 128 MMHG

## 2020-01-01 VITALS
WEIGHT: 170 LBS | DIASTOLIC BLOOD PRESSURE: 59 MMHG | HEIGHT: 64 IN | HEART RATE: 92 BPM | SYSTOLIC BLOOD PRESSURE: 128 MMHG

## 2020-01-01 VITALS — WEIGHT: 170 LBS | HEIGHT: 64 IN

## 2020-01-01 VITALS — HEIGHT: 64 IN | WEIGHT: 170 LBS

## 2020-01-01 DIAGNOSIS — Z48.02 ENCOUNTER FOR REMOVAL OF SUTURES: ICD-10-CM

## 2020-01-01 PROCEDURE — ? PRESCRIPTION

## 2020-01-01 PROCEDURE — 11103 TANGNTL BX SKIN EA SEP/ADDL: CPT | Mod: 59

## 2020-01-01 PROCEDURE — ? TREATMENT REGIMEN

## 2020-01-01 PROCEDURE — 13120 CMPLX RPR S/A/L 1.1-2.5 CM: CPT

## 2020-01-01 PROCEDURE — ? SUTURE REMOVAL (GLOBAL PERIOD)

## 2020-01-01 PROCEDURE — ? COUNSELING

## 2020-01-01 PROCEDURE — ? BIOPSY BY SHAVE METHOD

## 2020-01-01 PROCEDURE — 99212 OFFICE O/P EST SF 10 MIN: CPT

## 2020-01-01 PROCEDURE — ? REPAIR NOTE

## 2020-01-01 PROCEDURE — 99024 POSTOP FOLLOW-UP VISIT: CPT

## 2020-01-01 PROCEDURE — ? MOHS SURGERY

## 2020-01-01 PROCEDURE — 69100 BIOPSY OF EXTERNAL EAR: CPT

## 2020-01-01 PROCEDURE — 11102 TANGNTL BX SKIN SINGLE LES: CPT | Mod: 59

## 2020-01-01 PROCEDURE — 17311 MOHS 1 STAGE H/N/HF/G: CPT

## 2020-01-01 RX ORDER — FLUOROURACIL 2 G/40G
CREAM TOPICAL BID
Qty: 1 | Refills: 3 | Status: ERX | COMMUNITY
Start: 2020-01-01

## 2020-01-01 RX ADMIN — FLUOROURACIL: 2 CREAM TOPICAL at 00:00

## 2020-01-01 ASSESSMENT — LOCATION SIMPLE DESCRIPTION DERM: LOCATION SIMPLE: LEFT SCALP

## 2020-01-01 ASSESSMENT — LOCATION DETAILED DESCRIPTION DERM: LOCATION DETAILED: LEFT CENTRAL FRONTAL SCALP

## 2020-01-01 ASSESSMENT — LOCATION ZONE DERM: LOCATION ZONE: SCALP

## 2020-01-02 ENCOUNTER — HOSPITAL ENCOUNTER (OUTPATIENT)
Dept: HOSPITAL 35 - PAIN | Age: 75
End: 2020-01-02
Attending: CLINICAL NURSE SPECIALIST
Payer: COMMERCIAL

## 2020-01-02 VITALS — HEIGHT: 62.99 IN | BODY MASS INDEX: 30.12 KG/M2 | WEIGHT: 170 LBS

## 2020-01-02 VITALS — SYSTOLIC BLOOD PRESSURE: 157 MMHG | DIASTOLIC BLOOD PRESSURE: 73 MMHG

## 2020-01-02 DIAGNOSIS — M81.0: ICD-10-CM

## 2020-01-02 DIAGNOSIS — M47.816: Primary | ICD-10-CM

## 2020-01-02 DIAGNOSIS — J44.9: ICD-10-CM

## 2020-01-02 DIAGNOSIS — Z79.891: ICD-10-CM

## 2020-01-02 DIAGNOSIS — M48.061: ICD-10-CM

## 2020-01-02 DIAGNOSIS — M19.90: ICD-10-CM

## 2020-01-02 NOTE — HPC
[facility]
[address]
[place]   [address]                     PAIN MANAGEMENT CONSULTATION  
_______________________________________________________________________________
 
Name:       EBER AVERY                     Room #:                     REG HERLINDA 
JAMAAL#:      [id number]                       Account #:      [id number]  
Admission:  01/02/20    Attend Phys:    Nancy Brown     
Discharge:              Date of Birth:  [birth year]  
                                                          Report #: [id number]
                                                                    [id number]   
_______________________________________________________________________________
THIS REPORT FOR:   //name//                          
 
CC: Nancy Randolph MD
 
DATE OF SERVICE:  01/02/2020
 
 
CHIEF COMPLAINT:  Low back pain with spondylosis and osteoarthritis.
 
HISTORY OF PRESENT ILLNESS:  This is a 74-year-old gentleman who returns to the
pain clinic today for refill of his medications.  He was recently seen in
November where we had adjusted his medications slightly, taking him off his
OxyContin, placing him only on oxycodone 5/325.  The patient feels that 3
tablets every day is not quite enough to cover most of his pain.  He did run out
of his medications earlier this week because he was taking 4 tablets a day most
days.  He feels that he does well and is controlling his pain when he does take
4 a day.  He is requesting a slight increase in that medication since he is no
longer on his OxyContin.  The patient complains of pain score of 4-5 today. 
Most of his pain is located in his shoulders and wrists and knees from his
osteoarthritis.  He does also have mid back pain.  He says that his pain is
worse with standing and lifting his arms.
  He is currently still sleeping in his recliner, gradually working to try and
sleep in his bed and his wife has gotten for him a hospital bed on the second
floor and has made adjustments in that room with a bathroom to make it easier
for him.  She has also set the room up with oxygen that he does wear on a daily
basis.
 
ALLERGIES:  CODEINE, PIPERACILLIN, GABAPENTIN AND TAZOBACTAM.
 
CURRENT LIST OF MEDICATIONS:  OxyContin 5/325, Stiolto, potassium, Protonix,
Lipitor, iron, aspirin, Plavix, Zoloft, Imdur, Demadex, Voltaren Gel and Tylenol
Extra Strength.
 
PQRS:
1.  He has osteoarthritis in his hands, knees, shoulders and spine.  Denies
rheumatoid arthritis.
2.  Height is 5 feet 3 inches, weight is 170, BMI is 30.
3.  Vital Signs:  Blood pressure 157/73, pulse is 97, respirations 20, oxygen
sat is 98.
4.  Pain score is 4-5.
5.  Denies dizziness.  He does need help walking.  He is in a wheelchair today,
has not fallen in the last 3 months.  The patient is on Plavix as well as
medicines for hypertension.
 
 
 
[facility]
[address] [place] [address]                     PAIN MANAGEMENT CONSULTATION  
_______________________________________________________________________________
 
Name:       EBER AVERY                     Room #:                     REG HERLINDA HINTON#:      [id number]                       Account #:      [id number]  
Admission:  01/02/20    Attend Phys:    Nancy Brown     
Discharge:              Date of Birth:  [birth year]  
                                                          Report #: [id number]
                                                                    [id number]   
_______________________________________________________________________________
6.  His opioid therapy is greater than 6 weeks; therefore, an opioid signed
contract is on the chart.  Risk assessment is moderate.  Functional assessment
is 6/70.
7.  Recreational drug use, he denies.  He is a former smoker and does not drink
alcohol.
 
According to the prescription monitoring system, the patient is due to fill his
medications next week.  He is filling them in a timely fashion.
 
PHYSICAL EXAMINATION:
GENERAL:  This is alert and orientated 74-year-old gentleman who appears his
stated age, placing his current pain score at 4-5.  He does not have any signs
of being overmedicated.
HEENT:  Normocephalic, atraumatic.  Extraocular eye muscles are intact.  He is
wearing a nasal cannula.  Vision is diminished due to macular degeneration.
LUNGS:  Sounds diminished with 3 liters of nasal cannula.
MUSCULOSKELETAL:  The patient is in a wheelchair today.  He has tenderness in
his right shoulder as well as bilateral hands and knees as well as tenderness
over his mid thoracic region.  He is deconditioned in his upper and lower
extremities.
 
IMPRESSION:
1.  Severe chronic obstructive pulmonary disease with supplemental oxygen.
2.  Chronic low back pain with spondylosis and spinal stenosis.
3.  Osteoarthritis.
4.  Recent heart attack with stents.  He is on anticoagulation therapy and
Plavix.
5.  Osteoporosis.
6.  Management of high risk medications under terms of written opioid agreement.
 
We reviewed the fact that opiate medications are being used to provide analgesia
adequate to support activities of daily living, not attempting to achieve a
specific pain score on the 0-10 Visual Analog Scale.  The current opiate
medications are providing sufficient analgesia to allow the patient to
participate in activities of daily living.  The patient is not exhibiting any
aberrant behavior suggestive of drug diversion.  The patient is not having any
adverse reactions to medications.  The patient is not suffering from daytime
somnolence or mental acuity changes.  The patient is managing opiate-induced
constipation with appropriate over-the-counter agents and dietary
considerations.  The patient was counseled on concern for caution with operating
a motor vehicle while using opiate medications.
 
PLAN:
1.  We discussed treatment options with the patient today.  The patient feels
that his pain is better controlled when he does take 4 oxycodone a day.  He has
run out earlier this week and started taking 1 OxyContin a day.  I encouraged
 
 
 
[facility]
[address] [place]   [address]                     PAIN MANAGEMENT CONSULTATION  
_______________________________________________________________________________
 
Name:       EBER AVERY                     Room #:                     REG HERLINDA HINTON#:      [id number]                       Account #:      [id number]  
Admission:  01/02/20    Attend Phys:    Nancy Brown     
Discharge:              Date of Birth:  [birth year]  
                                                          Report #: [id number]
                                                                    [id number]   
_______________________________________________________________________________
the patient to stop his OxyContin and to discard this medication.  We are trying
to keep him off that medication.  We will increase him slightly 1 pill a day of
his oxycodone 5/325, allowing him 4 tablets every day.  The patient believes
that this will be enough to help control his pain.  According to the CDC
guidelines, this will still place him under 30 morphine mEq a day.  We will
e-prescribe this medication to his pharmacy allowing him to pick this up today
since he is out for a total of 3 months.
2.  I encouraged the patient to take his Voltaren Gel up to 4 times a day on his
hands, shoulders and knees for his osteoarthritic changes since he is unable to
take any anti-inflammatories due to his Plavix dose.
3.  We did discuss the amount of Tylenol he is taking.  I instructed him that
every pain pill does have 325 mg of Tylenol.  The patient encouraged to take no
more than 2 Extra Strength arthritis or no more than 6 of the Extra Strength
Tylenol pills a day.  The patient verbalizes understanding.
4.  The patient will return in 3 months.  He will call if problems arise.  The
patient is seen today in collaboration with Dr. Albert Randolph.
 
 
 
 
 
 
 
 
 
 
 
 
 
 
 
 
 
 
 
 
 
 
 
 
 
 
 
 
  <ELECTRONICALLY SIGNED>
   By: Nancy Brown             
  01/02/20     1611
D: 01/02/20 1239                           _____________________________________
T: 01/02/20 1447                           Nancy manriquez

## 2020-01-25 ENCOUNTER — HOSPITAL ENCOUNTER (INPATIENT)
Dept: HOSPITAL 35 - ER | Age: 75
LOS: 2 days | Discharge: HOME | DRG: 281 | End: 2020-01-27
Attending: FAMILY MEDICINE | Admitting: FAMILY MEDICINE
Payer: COMMERCIAL

## 2020-01-25 VITALS — HEIGHT: 64.02 IN | BODY MASS INDEX: 29.19 KG/M2 | WEIGHT: 171 LBS

## 2020-01-25 VITALS — SYSTOLIC BLOOD PRESSURE: 123 MMHG | DIASTOLIC BLOOD PRESSURE: 57 MMHG

## 2020-01-25 VITALS — SYSTOLIC BLOOD PRESSURE: 133 MMHG | DIASTOLIC BLOOD PRESSURE: 53 MMHG

## 2020-01-25 VITALS — DIASTOLIC BLOOD PRESSURE: 60 MMHG | SYSTOLIC BLOOD PRESSURE: 136 MMHG

## 2020-01-25 VITALS — SYSTOLIC BLOOD PRESSURE: 139 MMHG | DIASTOLIC BLOOD PRESSURE: 72 MMHG

## 2020-01-25 VITALS — SYSTOLIC BLOOD PRESSURE: 116 MMHG | DIASTOLIC BLOOD PRESSURE: 57 MMHG

## 2020-01-25 DIAGNOSIS — J44.9: ICD-10-CM

## 2020-01-25 DIAGNOSIS — Z96.659: ICD-10-CM

## 2020-01-25 DIAGNOSIS — H35.30: ICD-10-CM

## 2020-01-25 DIAGNOSIS — Z98.52: ICD-10-CM

## 2020-01-25 DIAGNOSIS — Z88.1: ICD-10-CM

## 2020-01-25 DIAGNOSIS — Z91.81: ICD-10-CM

## 2020-01-25 DIAGNOSIS — Z79.891: ICD-10-CM

## 2020-01-25 DIAGNOSIS — Z79.82: ICD-10-CM

## 2020-01-25 DIAGNOSIS — E78.00: ICD-10-CM

## 2020-01-25 DIAGNOSIS — I25.2: ICD-10-CM

## 2020-01-25 DIAGNOSIS — H40.9: ICD-10-CM

## 2020-01-25 DIAGNOSIS — M54.9: ICD-10-CM

## 2020-01-25 DIAGNOSIS — E78.5: ICD-10-CM

## 2020-01-25 DIAGNOSIS — Z88.5: ICD-10-CM

## 2020-01-25 DIAGNOSIS — D72.829: ICD-10-CM

## 2020-01-25 DIAGNOSIS — H26.9: ICD-10-CM

## 2020-01-25 DIAGNOSIS — I13.0: ICD-10-CM

## 2020-01-25 DIAGNOSIS — Z87.442: ICD-10-CM

## 2020-01-25 DIAGNOSIS — G89.29: ICD-10-CM

## 2020-01-25 DIAGNOSIS — I21.4: Primary | ICD-10-CM

## 2020-01-25 DIAGNOSIS — Z95.5: ICD-10-CM

## 2020-01-25 DIAGNOSIS — C44.609: ICD-10-CM

## 2020-01-25 DIAGNOSIS — I50.30: ICD-10-CM

## 2020-01-25 DIAGNOSIS — C44.40: ICD-10-CM

## 2020-01-25 DIAGNOSIS — C44.602: ICD-10-CM

## 2020-01-25 DIAGNOSIS — I24.9: ICD-10-CM

## 2020-01-25 DIAGNOSIS — F32.9: ICD-10-CM

## 2020-01-25 DIAGNOSIS — Z79.899: ICD-10-CM

## 2020-01-25 DIAGNOSIS — Z88.8: ICD-10-CM

## 2020-01-25 DIAGNOSIS — M81.0: ICD-10-CM

## 2020-01-25 DIAGNOSIS — R00.0: ICD-10-CM

## 2020-01-25 DIAGNOSIS — Z87.01: ICD-10-CM

## 2020-01-25 DIAGNOSIS — C44.300: ICD-10-CM

## 2020-01-25 DIAGNOSIS — I25.110: ICD-10-CM

## 2020-01-25 DIAGNOSIS — Z99.81: ICD-10-CM

## 2020-01-25 DIAGNOSIS — N18.9: ICD-10-CM

## 2020-01-25 DIAGNOSIS — I45.10: ICD-10-CM

## 2020-01-25 DIAGNOSIS — I25.10: ICD-10-CM

## 2020-01-25 LAB
ALBUMIN SERPL-MCNC: 3.5 G/DL (ref 3.4–5)
ALT SERPL-CCNC: 26 U/L (ref 30–65)
ANION GAP SERPL CALC-SCNC: 7 MMOL/L (ref 7–16)
ANISOCYTOSIS BLD QL SMEAR: (no result)
APTT BLD: 24 SECONDS (ref 24.5–32.8)
AST SERPL-CCNC: 16 U/L (ref 15–37)
BASOPHILS NFR BLD AUTO: 0 % (ref 0–2)
BE(VIVO): 4 MMOL/L
BILIRUB SERPL-MCNC: 0.3 MG/DL
BUN SERPL-MCNC: 15 MG/DL (ref 7–18)
CALCIUM SERPL-MCNC: 9 MG/DL (ref 8.5–10.1)
CHLORIDE SERPL-SCNC: 103 MMOL/L (ref 98–107)
CO2 SERPL-SCNC: 33 MMOL/L (ref 21–32)
CREAT SERPL-MCNC: 1.4 MG/DL (ref 0.7–1.3)
EOSINOPHIL NFR BLD: 1 % (ref 0–3)
ERYTHROCYTE [DISTWIDTH] IN BLOOD BY AUTOMATED COUNT: 15.4 % (ref 10.5–14.5)
GAS PNL BLDV: 55.6 MMHG (ref 35–45)
GLUCOSE SERPL-MCNC: 107 MG/DL (ref 74–106)
GRANULOCYTES NFR BLD MANUAL: 70 % (ref 36–66)
HCO3 BLD-SCNC: 29.5 MMOL/L (ref 22–26)
HCT VFR BLD CALC: 33 % (ref 42–52)
HGB BLD-MCNC: 10.6 GM/DL (ref 14–18)
INR PPP: 1
LYMPHOCYTES NFR BLD AUTO: 15 % (ref 24–44)
MCH RBC QN AUTO: 28.6 PG (ref 26–34)
MCHC RBC AUTO-ENTMCNC: 32.2 G/DL (ref 28–37)
MCV RBC: 88.9 FL (ref 80–100)
METAMYELOCYTES NFR BLD: 1 %
MONOCYTES NFR BLD: 13 % (ref 1–8)
NEUTROPHILS # BLD: 6.9 THOU/UL (ref 1.4–8.2)
NEUTS BAND NFR BLD: 0 % (ref 0–8)
OVALOCYTES BLD QL SMEAR: (no result)
PCO2 BLDV: 48.9 MMHG (ref 41–51)
PLATELET # BLD: 221 THOU/UL (ref 150–400)
POTASSIUM SERPL-SCNC: 3.5 MMOL/L (ref 3.5–5.1)
PROT SERPL-MCNC: 6.2 G/DL (ref 6.4–8.2)
PROTHROMBIN TIME: 10 SECONDS (ref 9.3–11.4)
RBC # BLD AUTO: 3.72 MIL/UL (ref 4.5–6)
SODIUM SERPL-SCNC: 143 MMOL/L (ref 136–145)
TROPONIN I SERPL-MCNC: <0.06 NG/ML (ref ?–0.06)
WBC # BLD AUTO: 9.8 THOU/UL (ref 4–11)

## 2020-01-25 PROCEDURE — 10081 I&D PILONIDAL CYST COMP: CPT

## 2020-01-25 NOTE — EKG
[address]
[place]  [address]
Phone:  [phone]                    ELECTROCARDIOGRAM REPORT      
_______________________________________________________________________________
 
Name:       EBER AVERY                   Room #:                     PRE ER  
M.RMichael#:      [id number]     Account #:      [id number]  
Admission:              Attend Phys:                          
Discharge:              Date of Birth:  [birth year]  
                                                          Report #: [id number]
   [id number]
_______________________________________________________________________________
THIS REPORT FOR:   //name//                          
 
                         [facility] ED
                                       
Test Date:    2020               Test Time:    10:43:20
Pat Name:     EBER AVERY                Department:   
Patient ID:   [id number]            Room:          
Gender:       M                        Technician:   KASH
:          1945               Requested By: Gutierrez Castro
Order Number: [id number] MD:   Dae Uribe
                                 Measurements
Intervals                              Axis          
Rate:         103                      P:            60
KS:           164                      QRS:          -88
QRSD:         146                      T:            52
QT:           373                                    
QTc:          489                                    
                           Interpretive Statements
Sinus tachycardia
RBBB and LAFB
Compared to ECG 10/07/2019 17:33:47
No significant changes
 
Electronically Signed On 2020 10:53:55 CST by Dae Uribe
[email or web address]
 
 
 
 
 
 
 
 
 
 
 
 
 
 
 
 
 
 
  <ELECTRONICALLY SIGNED>
   By: Dae Uribe MD               
  20     1053
D: 20 1043                           _____________________________________
T: 20 1043                           Dae Uribe MD                 /EPI

## 2020-01-25 NOTE — NUR
PT. ARRIVED AROUND 1600; AOX4; NO C/O CP; ON 3L O2; EDUCATED ABOUT FALL
PRECAUTIONS; ST. UNDERSTANDING; SITTIN ON THE CHAIR; CALL LIGHT & PERSONAL
ITEMS WITHIN REACH; ADMISSION PERFORMED; DR. PALOMO CONTACTED ABOUT
MEDICATION; ORDERS RECEIVED; SR ON THE MONITOR; ASSESSMENT AS CHARGED;
FOLLOWING POC;

## 2020-01-26 VITALS — SYSTOLIC BLOOD PRESSURE: 90 MMHG | DIASTOLIC BLOOD PRESSURE: 65 MMHG

## 2020-01-26 VITALS — SYSTOLIC BLOOD PRESSURE: 133 MMHG | DIASTOLIC BLOOD PRESSURE: 60 MMHG

## 2020-01-26 VITALS — SYSTOLIC BLOOD PRESSURE: 117 MMHG | DIASTOLIC BLOOD PRESSURE: 44 MMHG

## 2020-01-26 VITALS — DIASTOLIC BLOOD PRESSURE: 52 MMHG | SYSTOLIC BLOOD PRESSURE: 111 MMHG

## 2020-01-26 VITALS — SYSTOLIC BLOOD PRESSURE: 93 MMHG | DIASTOLIC BLOOD PRESSURE: 51 MMHG

## 2020-01-26 VITALS — DIASTOLIC BLOOD PRESSURE: 59 MMHG | SYSTOLIC BLOOD PRESSURE: 136 MMHG

## 2020-01-26 VITALS — DIASTOLIC BLOOD PRESSURE: 53 MMHG | SYSTOLIC BLOOD PRESSURE: 100 MMHG

## 2020-01-26 LAB
ANION GAP SERPL CALC-SCNC: 7 MMOL/L (ref 7–16)
BUN SERPL-MCNC: 14 MG/DL (ref 7–18)
CALCIUM SERPL-MCNC: 8.9 MG/DL (ref 8.5–10.1)
CHLORIDE SERPL-SCNC: 102 MMOL/L (ref 98–107)
CO2 SERPL-SCNC: 35 MMOL/L (ref 21–32)
CREAT SERPL-MCNC: 1.3 MG/DL (ref 0.7–1.3)
ERYTHROCYTE [DISTWIDTH] IN BLOOD BY AUTOMATED COUNT: 15.5 % (ref 10.5–14.5)
GLUCOSE SERPL-MCNC: 118 MG/DL (ref 74–106)
HCT VFR BLD CALC: 34.1 % (ref 42–52)
HGB BLD-MCNC: 10.9 GM/DL (ref 14–18)
MCH RBC QN AUTO: 28.5 PG (ref 26–34)
MCHC RBC AUTO-ENTMCNC: 32.1 G/DL (ref 28–37)
MCV RBC: 88.8 FL (ref 80–100)
PLATELET # BLD: 244 THOU/UL (ref 150–400)
POTASSIUM SERPL-SCNC: 3.5 MMOL/L (ref 3.5–5.1)
RBC # BLD AUTO: 3.84 MIL/UL (ref 4.5–6)
SODIUM SERPL-SCNC: 144 MMOL/L (ref 136–145)
WBC # BLD AUTO: 12.2 THOU/UL (ref 4–11)

## 2020-01-26 NOTE — NUR
PT ADMITTED FROM ED APPROX 2215HRS. A&OX4. C/O GENERALIZED ABD PAIN PRN PAIN
MEDS ORDERED. PT ASKING FOR PAIN MEDS OCCASIONALLY. PT BP ELEVATED SCHEDULED
AND PRN MEDS GIVEN OVERNITE. CONTINUING TO RECHECK BP. DIALYSIS PT. MWF.
LABS DRAWN THIS AM K REMAINS 5.8. PT HAS NEPHOLOGY CONSULTED. PT REPORTED
MISSING DIALYSIS ON FRI. DIALYSIS FISTULA ON LUA. PT ANXIOUS OCCASIONALLY
WALKING TO THE NURSE STATION. PT COOPERATIVE WITH CARE. NO N/V SINCE ARRIVAL
TO THE FLOOR. PT REMAINS NPO R/T N/V WHILE PT PRESENTED TO ED AT HS. NPO
EXCEPT FOR MEDS WITH SMALL SIPS OF H20

## 2020-01-26 NOTE — NUR
RECEIVED PT'S CARE AROUND 0710; PT. ON CHAIR; RESTING WITH EYES CLOSED; EQUAL
CHEST RISING NOTICED; DURING AM ASSESSMENT PT. C/O L. SIDE CHEST PAIN; 5/10;
SB NITROGLYCERING GIVEN X2; ST. DECREASE CP; 2/10; ST ON THE MONITOR; EKG
ORDER AS PROTOCOL; NO CHANGES NOTICED FROM LAST EKG; AM MEDICATIONS GIVEN;
RE-ASSESSMENT PT. ST. FEELING BETTER; TROPONIN ELEVATED; PHYSICIAN NOTIFIED;
NO NEW ORDERS; CARDIOLOGY NOTIFIED; DR. CESAR PATRICK LATER ON PT.; THROUGH
THE DAY PT. ST. DECREASE CHEST PAIN; C/O BACK PAIN; ST. TO TAKE PAIN
MEDICATION QID & NOT TID; MEDICATION CHANGE LAST WEEK; HOME MEDS UPDATE; WILL
PASS ON REPORT; THROUGH THE DAY NO C/O CP; EDUCATED ABOUT PAIN MANAGEMENT; ST.
UNDERSTANDING; WIFE AT THE BED SIDE THROUGH THE DAY; HAD BM; ASSESSMENT AS
CHARGED; FOLLOWING POC; ST. ON THE MONITOR; MONITORING; WILL PASS ON REPORT;

## 2020-01-26 NOTE — EKG
[place] [address] [address] [facility] [employer]
[place]  [address]
Phone:  [phone]                    ELECTROCARDIOGRAM REPORT      
_______________________________________________________________________________
 
Name:       EBER AVERY                   Room #:         215-P       ADM [place]  
M.R.#:      [id number]     Account #:      [id number]  
Admission:  20    Attend Phys:    Yvon Vazquez MD  
Discharge:              Date of Birth:  [birth year]  
                                                          Report #: [id number]
   [id number]
_______________________________________________________________________________
THIS REPORT FOR:   //name//                          
 
                          [facility]
                                       
Test Date:    2020               Test Time:    10:00:39
Pat Name:     EBER AVERY                Department:   
Patient ID:   [id number]            Room:         215 P
Gender:       M                        Technician:   VERO
:          1945               Requested By: Yvon Vazquez
Order Number: [id number] MD:   Dae Uribe
                                 Measurements
Intervals                              Axis          
Rate:         112                      P:            6
TX:           142                      QRS:          -97
QRSD:         134                      T:            43
QT:           374                                    
QTc:          511                                    
                           Interpretive Statements
Sinus tachycardia
RBBB and LAFB
Compared to ECG 2020 10:43:20
No significant changes
 
Electronically Signed On 2020 10:30:42 CST by Dae Uribe
[email or web address]
 
 
 
 
 
 
 
 
 
 
 
 
 
 
 
 
 
 
  <ELECTRONICALLY SIGNED>
   By: Dae Uribe MD               
  20     1030
D: 20 1000                           _____________________________________
T: 20 MD SHANTA Newby

## 2020-01-26 NOTE — NUR
PT A&O X4 ABLE TO MAKE BASIC NEEDS KNOWN. [place] C/O
PAIN X1 EFFECTIVELY CONTROLLED
VIA PRN PAIN MEDS. SLEPT ON RECLINER. 2L O2 PER NC. CONT USES URINAL REQUIRES
ASSISTANCE EMPTYING. PT PROVIDED WITH A WALKER AT HIS REQUEST.

## 2020-01-27 VITALS — SYSTOLIC BLOOD PRESSURE: 109 MMHG | DIASTOLIC BLOOD PRESSURE: 57 MMHG

## 2020-01-27 VITALS — DIASTOLIC BLOOD PRESSURE: 75 MMHG | SYSTOLIC BLOOD PRESSURE: 109 MMHG

## 2020-01-27 VITALS — DIASTOLIC BLOOD PRESSURE: 54 MMHG | SYSTOLIC BLOOD PRESSURE: 111 MMHG

## 2020-01-27 NOTE — NUR
ASSESSMENTS AS CHARTED, MEDS GIVEN AS CHARTED. PATIENT IN RECLINER DURING
SHIFT.  C/O CHRONIC PAIN, GIVEN PAIN MEDS THAT WERE SCHEDULED. COUGHED
FREQUENTLY DURING THE NIGHT, POST NASAL DRIP, SINUS TACH WITH BBB ON
TELEMETRY. DIURESING. ON 2 LITERS NASAL CANULA SAME AS AT HOME. PATIENT
REQUESTED TESSLA PEARLS THIS MORNING AND SOME MUCONEX. PASSED INFO TO DAY
NURSE TO REQUEST. PLANOF CARE IS TO GO HOME TODAY.

## 2020-01-27 NOTE — HC
[facility]
[address]
[place]   [address]                     CONSULTATION                  
_______________________________________________________________________________
 
Name:       EBER AVERY                   Room #:         215-P       [facility] [place]  
M.R.#:      [id number]                       Account #:      [id number]  
Admission:  01/25/20    Attend Phys:    Yvon Vazquez MD  
Discharge:              Date of Birth:  [birth year]  
                                                          Report #: [id number]
                                                                    [id number]   
_______________________________________________________________________________
THIS REPORT FOR:   //name//                          
 
CC: Dae Vazquez
 
DATE OF SERVICE:  01/26/2020
 
 
CARDIOLOGY CONSULTATION
 
INDICATION:  Chest pain.
 
HISTORY OF PRESENT ILLNESS:  This is a pleasant 74-year-old gentleman with a
history of CAD, myocardial infarction, stent placement, severe COPD, oxygen
dependent, diastolic heart failure, hypertension, hyperlipidemia, arthritis,
glaucoma, and chronic renal insufficiency.  He woke up yesterday with chest
discomfort and not feeling well.  He took a nitroglycerin without any relief. 
He presented to the ER for an evaluation.  He reports no changes to his
respiratory status.  He denies any recent fever, chills, cough, or diarrhea.
 
ALLERGIES:  Include CODEINE, PIPERACILLIN, AND GABAPENTIN.
 
MEDICATIONS:  Please see the MAR for full listing.
 
SOCIAL HISTORY:  Denies tobacco use, oxygen dependent.
 
FAMILY HISTORY:  Negative for premature CAD.
 
PAST MEDICAL HISTORY:  CAD with stent to the left main and left circumflex in
August.  Return for staged angioplasty with rotational atherectomy and stent
placement to the proximal LAD.  Has a severe obstruction in the ostial RCA,
COPD, oxygen dependent, general debility history of diastolic heart failure,
hypertension, hyperlipidemia, arthritis, and glaucoma.
 
REVIEW OF SYSTEMS:  A full 10-point review of systems performed.  Only the
pertinent positives and negatives are described in the HPI.
 
PHYSICAL EXAMINATION:
VITAL SIGNS:  Blood pressure is 130/80 and heart rate is 108 beats per minute.
GENERAL APPEARANCE:  An elderly appearing male sitting in a chair, in no acute
distress.
HEENT:  Normocephalic, atraumatic.  Oral mucosa moist.
NECK:  Supple.
LUNGS:  Diminished breath sounds at bases.
CARDIAC:  Tachycardic.  S1, S2 positive.
ABDOMEN:  Soft, nontender.
 
 
 
[facility]
[address]
[place]   [address]                     CONSULTATION                  
_______________________________________________________________________________
 
Name:       EBER AVERY                   Room #:         [address] [place] [address] [place]  
[facility].#:      [id number]                       Account #:      [id number]  
Admission:  01/25/20    Attend Phys:    Yvon Vazquez MD  
Discharge:              Date of Birth:  [birth year]  
                                                          Report #: [id number]
                                                                    [id number]   
_______________________________________________________________________________
EXTREMITIES:  Trace edema, no cyanosis.
 
ECG reveals sinus tachycardia, bifascicular block.
 
LABORATORY VALUES:  White count is 12.2, hemoglobin 10.9.  Peak troponin 0.72. 
Hemoglobin is 10.6.
 
ASSESSMENT AND PLAN:
1.  Acute coronary syndrome, the patient with chronic angina.  He has anginal
episodes infrequently.  He remained stable during this hospitalization.  I have
discussed with him the pros and cons of cardiac catheterization versus medical
therapy.  He would like to continue with medications at this time.  He would
like to continue with medical therapy at this time.  Continue on aspirin and
Plavix therapy.  Continue with long-acting nitrates.
2.  COPD, oxygen dependent.  Continue with oxygen and inhalers.
3.  Diastolic heart failure, continue with low dose diuretic therapy.  Stable
with no symptoms of congestion at this time.
4.  Hypercholesterolemia, continue with statin therapy.
 
 
 
 
 
 
 
 
 
 
 
 
 
 
 
 
 
 
 
 
 
 
 
 
 
 
  <ELECTRONICALLY SIGNED>
   By: Dae Uribe MD               
  01/27/20     0843
D: 01/26/20 1110                           _____________________________________
T: 01/26/20 1136                           Dae Uribe MD                 /nt

## 2020-01-27 NOTE — NUR
PT HAS DC ORDERS, ALL WERE PRINTED AND REVIEWED WITH PT, INCLUDING MEDS AND
FOLLOW UP INSTRUCTIONS, PT VERBALIZED UNDERSTANDING, TELE REMOVED, RIGHT AC IV
REMOVED, PT DRESSING, TRANSPORT WILL BE HERE TO PICK PT UP AT 12 AND TAKE HIM
TO MAIN ENTRANCE TO MEET WIFE.

## 2020-02-27 PROBLEM — D48.5 NEOPLASM OF UNCERTAIN BEHAVIOR OF SKIN: Status: ACTIVE | Noted: 2020-01-01

## 2020-02-27 NOTE — PROCEDURE: BIOPSY BY SHAVE METHOD
Detail Level: Detailed
Depth Of Biopsy: dermis
Was A Bandage Applied: Yes
Size Of Lesion In Cm: 0
Biopsy Type: H and E
Biopsy Method: Dermablade
Anesthesia Type: 1% lidocaine with epinephrine
Anesthesia Volume In Cc (Will Not Render If 0): 1
Hemostasis: Drysol
Wound Care: Petrolatum
Dressing: bandage
Destruction After The Procedure: No
Type Of Destruction Used: Curettage
Curettage Text: The wound bed was treated with curettage after the biopsy was performed.
Cryotherapy Text: The wound bed was treated with cryotherapy after the biopsy was performed.
Electrodesiccation Text: The wound bed was treated with electrodesiccation after the biopsy was performed.
Electrodesiccation And Curettage Text: The wound bed was treated with electrodesiccation and curettage after the biopsy was performed.
Silver Nitrate Text: The wound bed was treated with silver nitrate after the biopsy was performed.
Lab: [id number]
Lab Facility: [id number]
Consent: Written consent was obtained and risks were reviewed including but not limited to scarring, infection, bleeding, scabbing, incomplete removal, nerve damage and allergy to anesthesia.
Post-Care Instructions: I reviewed with the patient in detail post-care instructions. Patient is to keep the biopsy site dry overnight, and then apply bacitracin twice daily until healed. Patient may apply hydrogen peroxide soaks to remove any crusting.
Notification Instructions: Patient will be notified of biopsy results. However, patient instructed to call the office if not contacted within 2 weeks.
Billing Type: Third-Party Bill
Lab: [id number]
Lab Facility: [id number]
Billing Type: Third-Party Bill

## 2020-03-03 ENCOUNTER — HOSPITAL ENCOUNTER (OUTPATIENT)
Dept: HOSPITAL 35 - RAD | Age: 75
End: 2020-03-03
Attending: FAMILY MEDICINE
Payer: COMMERCIAL

## 2020-03-03 DIAGNOSIS — J44.9: ICD-10-CM

## 2020-03-03 DIAGNOSIS — J98.4: Primary | ICD-10-CM

## 2020-03-04 ENCOUNTER — HOSPITAL ENCOUNTER (OUTPATIENT)
Dept: HOSPITAL 35 - SJCVC | Age: 75
End: 2020-03-04
Attending: INTERNAL MEDICINE
Payer: COMMERCIAL

## 2020-03-04 DIAGNOSIS — I50.9: ICD-10-CM

## 2020-03-04 DIAGNOSIS — R00.0: ICD-10-CM

## 2020-03-04 DIAGNOSIS — I45.10: Primary | ICD-10-CM

## 2020-03-04 DIAGNOSIS — Z87.891: ICD-10-CM

## 2020-03-04 DIAGNOSIS — I13.0: ICD-10-CM

## 2020-03-04 DIAGNOSIS — E78.5: ICD-10-CM

## 2020-03-04 DIAGNOSIS — Z79.899: ICD-10-CM

## 2020-03-04 DIAGNOSIS — R94.31: ICD-10-CM

## 2020-03-04 DIAGNOSIS — I25.2: ICD-10-CM

## 2020-03-04 DIAGNOSIS — I25.118: ICD-10-CM

## 2020-03-04 DIAGNOSIS — K21.9: ICD-10-CM

## 2020-03-04 DIAGNOSIS — N18.9: ICD-10-CM

## 2020-03-04 DIAGNOSIS — Z79.82: ICD-10-CM

## 2020-03-04 DIAGNOSIS — J43.9: ICD-10-CM

## 2020-03-04 DIAGNOSIS — E11.22: ICD-10-CM

## 2020-03-13 PROBLEM — C44.42 SQUAMOUS CELL CARCINOMA OF SKIN OF SCALP AND NECK: Status: ACTIVE | Noted: 2020-01-01

## 2020-03-13 NOTE — PROCEDURE: MOHS SURGERY
Biopsy Photograph Reviewed: Yes
Consent Type: Consent 1 (Standard)
Eye Shield Used: No
Initial Size Of Lesion: 1.8
X Size Of Lesion In Cm (Optional): 1.2
Number Of Stages: 1
Primary Defect Length In Cm (Final Defect Size - Required For Flaps/Grafts): 2
Primary Defect Width In Cm (Final Defect Size - Required For Flaps/Grafts): 1.4
Repair Type: Referred to ASC for closure
Oculoplastic Surgeon Procedure Text (A): After obtaining clear surgical margins the patient was sent to oculoplastics for surgical repair.  The patient understands they will receive post-surgical care and follow-up from the referring physician's office.
Oculoplastic Surgeon Procedure Text (B): After obtaining clear surgical margins the patient was sent to oculoplastics for surgical repair.  The patient understands they will receive post-surgical care and follow-up from the referring physician's office.
Otolaryngologist Procedure Text (A): After obtaining clear surgical margins the patient was sent to otolaryngology for surgical repair.  The patient understands they will receive post-surgical care and follow-up from the referring physician's office.
Otolaryngologist Procedure Text (B): After obtaining clear surgical margins the patient was sent to otolaryngology for surgical repair.  The patient understands they will receive post-surgical care and follow-up from the referring physician's office.
Plastic Surgeon Procedure Text (A): After obtaining clear surgical margins the patient was sent to plastics for surgical repair.  The patient understands they will receive post-surgical care and follow-up from the referring physician's office.
Plastic Surgeon Procedure Text (B): After obtaining clear surgical margins the patient was sent to plastics for surgical repair.  The patient understands they will receive post-surgical care and follow-up from the referring physician's office.
Mid-Level Procedure Text (A): After obtaining clear surgical margins the patient was sent to a mid-level provider for surgical repair.  The patient understands they will receive post-surgical care and follow-up from the mid-level provider.
Mid-Level Procedure Text (B): After obtaining clear surgical margins the patient was sent to a mid-level provider for surgical repair.  The patient understands they will receive post-surgical care and follow-up from the mid-level provider.
Provider Procedure Text (A): After obtaining clear surgical margins the defect was repaired by another provider.
Asc Procedure Text (A): After obtaining clear surgical margins the patient was sent to an ASC for surgical repair.  The patient understands they will receive post-surgical care and follow-up from the ASC physician.
Asc Procedure Text (B): After obtaining clear surgical margins the patient was sent to an ASC for surgical repair.  The patient understands they will receive post-surgical care and follow-up from the ASC physician.
Suturegard Retention Suture: 2-0 Nylon
Retention Suture Bite Size: 3 mm
Length To Time In Minutes Device Was In Place: 10
Simple / Intermediate / Complex Repair - Final Wound Length In Cm: 0
Undermining Type: Entire Wound
Debridement Text: The wound edges were debrided prior to proceeding with the closure to facilitate wound healing.
Helical Rim Text: The closure involved the helical rim.
Vermilion Border Text: The closure involved the vermilion border.
Nostril Rim Text: The closure involved the nostril rim.
Retention Suture Text: Retention sutures were placed to support the closure and prevent dehiscence.
Location Indication Override (Is Already Calculated Based On Selected Body Location): Area M
Area H Indication Text: Tumors in this location are included in Area H (eyelids, eyebrows, nose, lips, chin, ear, pre-auricular, post-auricular, temple, genitalia, hands, feet, ankles and areola).  Tissue conservation is critical in these anatomic locations.
Area M Indication Text: Tumors in this location are included in Area M (cheek, forehead, scalp, neck, jawline and pretibial skin).  Mohs surgery is indicated for tumors in these anatomic locations.
Area L Indication Text: Tumors in this location are included in Area L (trunk and extremities).  Mohs surgery is indicated for larger tumors, or tumors with aggressive histologic features, in these anatomic locations.
Tumor Debulked?: curette
Depth Of Tumor Invasion (For Histology): tumor not visualized (deep and peripheral margins are clear of tumor)
Perineural Invasion (For Histology - Be Specific If Possible): absent
Special Stains Stage 1 - Results: Base On Clearance Noted Above
Stage 2: Additional Anesthesia Type: 1% lidocaine with epinephrine
Medical Necessity Statement: Based on my medical judgement, Mohs surgery is the most appropriate treatment for this cancer compared to other treatments.
Alternatives Discussed Intro (Do Not Add Period): I discussed alternative treatments to Mohs surgery and specifically discussed the risks and benefits of
Consent 1/Introductory Paragraph: The rationale for Mohs was explained to the patient and consent was obtained. The risks, benefits and alternatives to therapy were discussed in detail. Specifically, the risks of infection, scarring, bleeding, prolonged wound healing, incomplete removal, allergy to anesthesia, nerve injury and recurrence were addressed. Prior to the procedure, the treatment site was clearly identified and confirmed by the patient. All components of Universal Protocol/PAUSE Rule completed.
Consent 2/Introductory Paragraph: Mohs surgery was explained to the patient and consent was obtained. The risks, benefits and alternatives to therapy were discussed in detail. Specifically, the risks of infection, scarring, bleeding, prolonged wound healing, incomplete removal, allergy to anesthesia, nerve injury and recurrence were addressed. Prior to the procedure, the treatment site was clearly identified and confirmed by the patient. All components of Universal Protocol/PAUSE Rule completed.
Consent 3/Introductory Paragraph: I gave the patient a chance to ask questions they had about the procedure.  Following this I explained the Mohs procedure and consent was obtained. The risks, benefits and alternatives to therapy were discussed in detail. Specifically, the risks of infection, scarring, bleeding, prolonged wound healing, incomplete removal, allergy to anesthesia, nerve injury and recurrence were addressed. Prior to the procedure, the treatment site was clearly identified and confirmed by the patient. All components of Universal Protocol/PAUSE Rule completed.
Consent (Temporal Branch)/Introductory Paragraph: The rationale for Mohs was explained to the patient and consent was obtained. The risks, benefits and alternatives to therapy were discussed in detail. Specifically, the risks of damage to the temporal branch of the facial nerve, infection, scarring, bleeding, prolonged wound healing, incomplete removal, allergy to anesthesia, and recurrence were addressed. Prior to the procedure, the treatment site was clearly identified and confirmed by the patient. All components of Universal Protocol/PAUSE Rule completed.
Consent (Marginal Mandibular)/Introductory Paragraph: The rationale for Mohs was explained to the patient and consent was obtained. The risks, benefits and alternatives to therapy were discussed in detail. Specifically, the risks of damage to the marginal mandibular branch of the facial nerve, infection, scarring, bleeding, prolonged wound healing, incomplete removal, allergy to anesthesia, and recurrence were addressed. Prior to the procedure, the treatment site was clearly identified and confirmed by the patient. All components of Universal Protocol/PAUSE Rule completed.
Consent (Spinal Accessory)/Introductory Paragraph: The rationale for Mohs was explained to the patient and consent was obtained. The risks, benefits and alternatives to therapy were discussed in detail. Specifically, the risks of damage to the spinal accessory nerve, infection, scarring, bleeding, prolonged wound healing, incomplete removal, allergy to anesthesia, and recurrence were addressed. Prior to the procedure, the treatment site was clearly identified and confirmed by the patient. All components of Universal Protocol/PAUSE Rule completed.
Consent (Near Eyelid Margin)/Introductory Paragraph: The rationale for Mohs was explained to the patient and consent was obtained. The risks, benefits and alternatives to therapy were discussed in detail. Specifically, the risks of ectropion or eyelid deformity, infection, scarring, bleeding, prolonged wound healing, incomplete removal, allergy to anesthesia, nerve injury and recurrence were addressed. Prior to the procedure, the treatment site was clearly identified and confirmed by the patient. All components of Universal Protocol/PAUSE Rule completed.
Consent (Ear)/Introductory Paragraph: The rationale for Mohs was explained to the patient and consent was obtained. The risks, benefits and alternatives to therapy were discussed in detail. Specifically, the risks of ear deformity, infection, scarring, bleeding, prolonged wound healing, incomplete removal, allergy to anesthesia, nerve injury and recurrence were addressed. Prior to the procedure, the treatment site was clearly identified and confirmed by the patient. All components of Universal Protocol/PAUSE Rule completed.
Consent (Nose)/Introductory Paragraph: The rationale for Mohs was explained to the patient and consent was obtained. The risks, benefits and alternatives to therapy were discussed in detail. Specifically, the risks of nasal deformity, changes in the flow of air through the nose, infection, scarring, bleeding, prolonged wound healing, incomplete removal, allergy to anesthesia, nerve injury and recurrence were addressed. Prior to the procedure, the treatment site was clearly identified and confirmed by the patient. All components of Universal Protocol/PAUSE Rule completed.
Consent (Lip)/Introductory Paragraph: The rationale for Mohs was explained to the patient and consent was obtained. The risks, benefits and alternatives to therapy were discussed in detail. Specifically, the risks of lip deformity, changes in the oral aperture, infection, scarring, bleeding, prolonged wound healing, incomplete removal, allergy to anesthesia, nerve injury and recurrence were addressed. Prior to the procedure, the treatment site was clearly identified and confirmed by the patient. All components of Universal Protocol/PAUSE Rule completed.
Consent (Scalp)/Introductory Paragraph: The rationale for Mohs was explained to the patient and consent was obtained. The risks, benefits and alternatives to therapy were discussed in detail. Specifically, the risks of changes in hair growth pattern secondary to repair, infection, scarring, bleeding, prolonged wound healing, incomplete removal, allergy to anesthesia, nerve injury and recurrence were addressed. Prior to the procedure, the treatment site was clearly identified and confirmed by the patient. All components of Universal Protocol/PAUSE Rule completed.
Detail Level: Detailed
Postop Diagnosis: same
Surgeon: Margarito Pierson MD
Anesthesia Type: 1% lidocaine with 1:100,000 epinephrine and a 1:10 solution of 8.4% sodium bicarbonate
Anesthesia Volume In Cc: 3
Additional Anesthesia Volume In Cc: 6
Hemostasis: Thermocautery
Estimated Blood Loss (Cc): minimal
Deep Sutures: 5-0 Vicryl
Epidermal Sutures: 6-0 Ethilon
Epidermal Closure: simple interrupted
Suturegard Intro: Intraoperative tissue expansion was performed, utilizing the SUTUREGARD device, in order to reduce wound tension.
Suturegard Body: The suture ends were repeatedly re-tightened and re-clamped to achieve the desired tissue expansion.
Donor Site Anesthesia Type: same as repair anesthesia
Graft Donor Site Bandage (Optional-Leave Blank If You Don't Want In Note): Steri-strips and a pressure bandage were applied to the donor site.
Closure 2 Information: This tab is for additional flaps and grafts, including complex repair and grafts and complex repair and flaps. You can also specify a different location for the additional defect, if the location is the same you do not need to select a new one. We will insert the automated text for the repair you select below just as we do for solitary flaps and grafts. Please note that at this time if you select a location with a different insurance zone you will need to override the ICD10 and CPT if appropriate.
Closure 3 Information: This tab is for additional flaps and grafts above and beyond our usual structured repairs.  Please note if you enter information here it will not currently bill and you will need to add the billing information manually.
Closure 4 Information: This tab is for additional flaps and grafts above and beyond our usual structured repairs.  Please note if you enter information here it will not currently bill and you will need to add the billing information manually.
Wound Care: Petrolatum
Dressing: pressure dressing with telfa
Dressing (No Sutures): dry sterile dressing
Unna Boot Text: An Unna boot was placed to help immobilize the limb and facilitate more rapid healing.
Home Suture Removal Text: Patient was provided instructions on removing sutures and will remove their sutures at home.  If they have any questions or difficulties they will call the office.
Post-Care Instructions: I reviewed with the patient in detail post-care instructions. Patient is not to engage in any heavy lifting, exercise, or swimming for the next 14 days. Should the patient develop any fevers, chills, bleeding, severe pain patient will contact the office immediately.
Pain Refusal Text: I offered to prescribe pain medication but the patient refused to take this medication.
Mauc Instructions: By selecting yes to the question below the MAUC number will be added into the note.  This will be calculated automatically based on the diagnosis chosen, the size entered, the body zone selected (H,M,L) and the specific indications you chose. You will also have the option to override the Mohs AUC if you disagree with the automatically calculated number and this option is found in the Case Summary tab.
Where Do You Want The Question To Include Opioid Counseling Located?: Case Summary Tab
Eye Protection Verbiage: Before proceeding with the stage, a plastic scleral shield was inserted. The globe was anesthetized with a few drops of 1% lidocaine with 1:100,000 epinephrine. Then, an appropriate sized scleral shield was chosen and coated with lacrilube ointment. The shield was gently inserted and left in place for the duration of each stage. After the stage was completed, the shield was gently removed.
Mohs Method Verbiage: An incision at a 45 degree angle following the standard Mohs approach was done and the specimen was harvested as a microscopic controlled layer.
Surgeon/Pathologist Verbiage (Will Incorporate Name Of Surgeon From Intro If Not Blank): operated in two distinct and integrated capacities as the surgeon and pathologist.
Mohs Histo Method Verbiage: Each section was then chromacoded and processed in the Mohs lab using the Mohs protocol and submitted for frozen section.
Subsequent Stages Histo Method Verbiage: Using a similar technique to that described above, a thin layer of tissue was removed from all areas where tumor was visible on the previous stage.  The tissue was again oriented, mapped, dyed, and processed as above.
Mohs Rapid Report Verbiage: The area of clinically evident tumor was marked with skin marking ink and appropriately hatched.  The initial incision was made following the Mohs approach through the skin.  The specimen was taken to the lab, divided into the necessary number of pieces, chromacoded and processed according to the Mohs protocol.  This was repeated in successive stages until a tumor free defect was achieved.
Complex Repair Preamble Text (Leave Blank If You Do Not Want): Extensive wide undermining was performed.
Intermediate Repair Preamble Text (Leave Blank If You Do Not Want): Undermining was performed with blunt dissection.
Non-Graft Cartilage Fenestration Text: The cartilage was fenestrated with a 2mm punch biopsy to help facilitate healing.
Graft Cartilage Fenestration Text: The cartilage was fenestrated with a 2mm punch biopsy to help facilitate graft survival and healing.
Secondary Intention Text (Leave Blank If You Do Not Want): The defect will heal with secondary intention.
No Repair - Repaired With Adjacent Surgical Defect Text (Leave Blank If You Do Not Want): After obtaining clear surgical margins the defect was repaired concurrently with another surgical defect which was in close approximation.
Advancement Flap (Single) Text: The defect edges were debeveled with a #15 scalpel blade.  Given the location of the defect and the proximity to free margins a single advancement flap was deemed most appropriate.  Using a sterile surgical marker, an appropriate advancement flap was drawn incorporating the defect and placing the expected incisions within the relaxed skin tension lines where possible.    The area thus outlined was incised deep to adipose tissue with a #15 scalpel blade.  The skin margins were undermined to an appropriate distance in all directions utilizing iris scissors.
Advancement Flap (Double) Text: The defect edges were debeveled with a #15 scalpel blade.  Given the location of the defect and the proximity to free margins a double advancement flap was deemed most appropriate.  Using a sterile surgical marker, the appropriate advancement flaps were drawn incorporating the defect and placing the expected incisions within the relaxed skin tension lines where possible.    The area thus outlined was incised deep to adipose tissue with a #15 scalpel blade.  The skin margins were undermined to an appropriate distance in all directions utilizing iris scissors.
Burow's Advancement Flap Text: The defect edges were debeveled with a #15 scalpel blade.  Given the location of the defect and the proximity to free margins a Burow's advancement flap was deemed most appropriate.  Using a sterile surgical marker, the appropriate advancement flap was drawn incorporating the defect and placing the expected incisions within the relaxed skin tension lines where possible.    The area thus outlined was incised deep to adipose tissue with a #15 scalpel blade.  The skin margins were undermined to an appropriate distance in all directions utilizing iris scissors.
Chonodrocutaneous Helical Advancement Flap Text: The defect edges were debeveled with a #15 scalpel blade.  Given the location of the defect and the proximity to free margins a chondrocutaneous helical advancement flap was deemed most appropriate.  Using a sterile surgical marker, the appropriate advancement flap was drawn incorporating the defect and placing the expected incisions within the relaxed skin tension lines where possible.    The area thus outlined was incised deep to adipose tissue with a #15 scalpel blade.  The skin margins were undermined to an appropriate distance in all directions utilizing iris scissors.
Crescentic Advancement Flap Text: The defect edges were debeveled with a #15 scalpel blade.  Given the location of the defect and the proximity to free margins a crescentic advancement flap was deemed most appropriate.  Using a sterile surgical marker, the appropriate advancement flap was drawn incorporating the defect and placing the expected incisions within the relaxed skin tension lines where possible.    The area thus outlined was incised deep to adipose tissue with a #15 scalpel blade.  The skin margins were undermined to an appropriate distance in all directions utilizing iris scissors.
A-T Advancement Flap Text: The defect edges were debeveled with a #15 scalpel blade.  Given the location of the defect, shape of the defect and the proximity to free margins an A-T advancement flap was deemed most appropriate.  Using a sterile surgical marker, an appropriate advancement flap was drawn incorporating the defect and placing the expected incisions within the relaxed skin tension lines where possible.    The area thus outlined was incised deep to adipose tissue with a #15 scalpel blade.  The skin margins were undermined to an appropriate distance in all directions utilizing iris scissors.
O-T Advancement Flap Text: The defect edges were debeveled with a #15 scalpel blade.  Given the location of the defect, shape of the defect and the proximity to free margins an O-T advancement flap was deemed most appropriate.  Using a sterile surgical marker, an appropriate advancement flap was drawn incorporating the defect and placing the expected incisions within the relaxed skin tension lines where possible.    The area thus outlined was incised deep to adipose tissue with a #15 scalpel blade.  The skin margins were undermined to an appropriate distance in all directions utilizing iris scissors.
O-L Flap Text: The defect edges were debeveled with a #15 scalpel blade.  Given the location of the defect, shape of the defect and the proximity to free margins an O-L flap was deemed most appropriate.  Using a sterile surgical marker, an appropriate advancement flap was drawn incorporating the defect and placing the expected incisions within the relaxed skin tension lines where possible.    The area thus outlined was incised deep to adipose tissue with a #15 scalpel blade.  The skin margins were undermined to an appropriate distance in all directions utilizing iris scissors.
O-Z Flap Text: The defect edges were debeveled with a #15 scalpel blade.  Given the location of the defect, shape of the defect and the proximity to free margins an O-Z flap was deemed most appropriate.  Using a sterile surgical marker, an appropriate transposition flap was drawn incorporating the defect and placing the expected incisions within the relaxed skin tension lines where possible. The area thus outlined was incised deep to adipose tissue with a #15 scalpel blade.  The skin margins were undermined to an appropriate distance in all directions utilizing iris scissors.
Double O-Z Flap Text: The defect edges were debeveled with a #15 scalpel blade.  Given the location of the defect, shape of the defect and the proximity to free margins a Double O-Z flap was deemed most appropriate.  Using a sterile surgical marker, an appropriate transposition flap was drawn incorporating the defect and placing the expected incisions within the relaxed skin tension lines where possible. The area thus outlined was incised deep to adipose tissue with a #15 scalpel blade.  The skin margins were undermined to an appropriate distance in all directions utilizing iris scissors.
V-Y Flap Text: The defect edges were debeveled with a #15 scalpel blade.  Given the location of the defect, shape of the defect and the proximity to free margins a V-Y flap was deemed most appropriate.  Using a sterile surgical marker, an appropriate advancement flap was drawn incorporating the defect and placing the expected incisions within the relaxed skin tension lines where possible.    The area thus outlined was incised deep to adipose tissue with a #15 scalpel blade.  The skin margins were undermined to an appropriate distance in all directions utilizing iris scissors.
Advancement-Rotation Flap Text: The defect edges were debeveled with a #15 scalpel blade.  Given the location of the defect, shape of the defect and the proximity to free margins an advancement-rotation flap was deemed most appropriate.  Using a sterile surgical marker, an appropriate flap was drawn incorporating the defect and placing the expected incisions within the relaxed skin tension lines where possible. The area thus outlined was incised deep to adipose tissue with a #15 scalpel blade.  The skin margins were undermined to an appropriate distance in all directions utilizing iris scissors.
Mercedes Flap Text: The defect edges were debeveled with a #15 scalpel blade.  Given the location of the defect, shape of the defect and the proximity to free margins a Mercedes flap was deemed most appropriate.  Using a sterile surgical marker, an appropriate advancement flap was drawn incorporating the defect and placing the expected incisions within the relaxed skin tension lines where possible. The area thus outlined was incised deep to adipose tissue with a #15 scalpel blade.  The skin margins were undermined to an appropriate distance in all directions utilizing iris scissors.
Modified Advancement Flap Text: The defect edges were debeveled with a #15 scalpel blade.  Given the location of the defect, shape of the defect and the proximity to free margins a modified advancement flap was deemed most appropriate.  Using a sterile surgical marker, an appropriate advancement flap was drawn incorporating the defect and placing the expected incisions within the relaxed skin tension lines where possible.    The area thus outlined was incised deep to adipose tissue with a #15 scalpel blade.  The skin margins were undermined to an appropriate distance in all directions utilizing iris scissors.
Mucosal Advancement Flap Text: Given the location of the defect, shape of the defect and the proximity to free margins a mucosal advancement flap was deemed most appropriate. Incisions were made with a 15 blade scalpel in the appropriate fashion along the cutaneous vermilion border and the mucosal lip. The remaining actinically damaged mucosal tissue was excised.  The mucosal advancement flap was then elevated to the gingival sulcus with care taken to preserve the neurovascular structures and advanced into the primary defect. Care was taken to ensure that precise realignment of the vermilion border was achieved.
Hatchet Flap Text: The defect edges were debeveled with a #15 scalpel blade.  Given the location of the defect, shape of the defect and the proximity to free margins a hatchet flap was deemed most appropriate.  Using a sterile surgical marker, an appropriate hatchet flap was drawn incorporating the defect and placing the expected incisions within the relaxed skin tension lines where possible.    The area thus outlined was incised deep to adipose tissue with a #15 scalpel blade.  The skin margins were undermined to an appropriate distance in all directions utilizing iris scissors.
Rotation Flap Text: The defect edges were debeveled with a #15 scalpel blade.  Given the location of the defect, shape of the defect and the proximity to free margins a rotation flap was deemed most appropriate.  Using a sterile surgical marker, an appropriate rotation flap was drawn incorporating the defect and placing the expected incisions within the relaxed skin tension lines where possible.    The area thus outlined was incised deep to adipose tissue with a #15 scalpel blade.  The skin margins were undermined to an appropriate distance in all directions utilizing iris scissors.
Spiral Flap Text: The defect edges were debeveled with a #15 scalpel blade.  Given the location of the defect, shape of the defect and the proximity to free margins a spiral flap was deemed most appropriate.  Using a sterile surgical marker, an appropriate rotation flap was drawn incorporating the defect and placing the expected incisions within the relaxed skin tension lines where possible. The area thus outlined was incised deep to adipose tissue with a #15 scalpel blade.  The skin margins were undermined to an appropriate distance in all directions utilizing iris scissors.
Star Wedge Flap Text: The defect edges were debeveled with a #15 scalpel blade.  Given the location of the defect, shape of the defect and the proximity to free margins a star wedge flap was deemed most appropriate.  Using a sterile surgical marker, an appropriate rotation flap was drawn incorporating the defect and placing the expected incisions within the relaxed skin tension lines where possible. The area thus outlined was incised deep to adipose tissue with a #15 scalpel blade.  The skin margins were undermined to an appropriate distance in all directions utilizing iris scissors.
Transposition Flap Text: The defect edges were debeveled with a #15 scalpel blade.  Given the location of the defect and the proximity to free margins a transposition flap was deemed most appropriate.  Using a sterile surgical marker, an appropriate transposition flap was drawn incorporating the defect.    The area thus outlined was incised deep to adipose tissue with a #15 scalpel blade.  The skin margins were undermined to an appropriate distance in all directions utilizing iris scissors.
Muscle Hinge Flap Text: The defect edges were debeveled with a #15 scalpel blade.  Given the size, depth and location of the defect and the proximity to free margins a muscle hinge flap was deemed most appropriate.  Using a sterile surgical marker, an appropriate hinge flap was drawn incorporating the defect. The area thus outlined was incised with a #15 scalpel blade.  The skin margins were undermined to an appropriate distance in all directions utilizing iris scissors.
Melolabial Transposition Flap Text: The defect edges were debeveled with a #15 scalpel blade.  Given the location of the defect and the proximity to free margins a melolabial flap was deemed most appropriate.  Using a sterile surgical marker, an appropriate melolabial transposition flap was drawn incorporating the defect.    The area thus outlined was incised deep to adipose tissue with a #15 scalpel blade.  The skin margins were undermined to an appropriate distance in all directions utilizing iris scissors.
Rhombic Flap Text: The defect edges were debeveled with a #15 scalpel blade.  Given the location of the defect and the proximity to free margins a rhombic flap was deemed most appropriate.  Using a sterile surgical marker, an appropriate rhombic flap was drawn incorporating the defect.    The area thus outlined was incised deep to adipose tissue with a #15 scalpel blade.  The skin margins were undermined to an appropriate distance in all directions utilizing iris scissors.
Rhomboid Transposition Flap Text: The defect edges were debeveled with a #15 scalpel blade.  Given the location of the defect and the proximity to free margins a rhomboid transposition flap was deemed most appropriate.  Using a sterile surgical marker, an appropriate rhomboid flap was drawn incorporating the defect.    The area thus outlined was incised deep to adipose tissue with a #15 scalpel blade.  The skin margins were undermined to an appropriate distance in all directions utilizing iris scissors.
Bi-Rhombic Flap Text: The defect edges were debeveled with a #15 scalpel blade.  Given the location of the defect and the proximity to free margins a bi-rhombic flap was deemed most appropriate.  Using a sterile surgical marker, an appropriate rhombic flap was drawn incorporating the defect. The area thus outlined was incised deep to adipose tissue with a #15 scalpel blade.  The skin margins were undermined to an appropriate distance in all directions utilizing iris scissors.
Helical Rim Advancement Flap Text: The defect edges were debeveled with a #15 blade scalpel.  Given the location of the defect and the proximity to free margins (helical rim) a double helical rim advancement flap was deemed most appropriate.  Using a sterile surgical marker, the appropriate advancement flaps were drawn incorporating the defect and placing the expected incisions between the helical rim and antihelix where possible.  The area thus outlined was incised through and through with a #15 scalpel blade.  With a skin hook and iris scissors, the flaps were gently and sharply undermined and freed up.
Bilateral Helical Rim Advancement Flap Text: The defect edges were debeveled with a #15 blade scalpel.  Given the location of the defect and the proximity to free margins (helical rim) a bilateral helical rim advancement flap was deemed most appropriate.  Using a sterile surgical marker, the appropriate advancement flaps were drawn incorporating the defect and placing the expected incisions between the helical rim and antihelix where possible.  The area thus outlined was incised through and through with a #15 scalpel blade.  With a skin hook and iris scissors, the flaps were gently and sharply undermined and freed up.
Ear Star Wedge Flap Text: The defect edges were debeveled with a #15 blade scalpel.  Given the location of the defect and the proximity to free margins (helical rim) an ear star wedge flap was deemed most appropriate.  Using a sterile surgical marker, the appropriate flap was drawn incorporating the defect and placing the expected incisions between the helical rim and antihelix where possible.  The area thus outlined was incised through and through with a #15 scalpel blade.
Banner Transposition Flap Text: The defect edges were debeveled with a #15 scalpel blade.  Given the location of the defect and the proximity to free margins a Banner transposition flap was deemed most appropriate.  Using a sterile surgical marker, an appropriate flap drawn around the defect. The area thus outlined was incised deep to adipose tissue with a #15 scalpel blade.  The skin margins were undermined to an appropriate distance in all directions utilizing iris scissors.
Bilobed Flap Text: The defect edges were debeveled with a #15 scalpel blade.  Given the location of the defect and the proximity to free margins a bilobe flap was deemed most appropriate.  Using a sterile surgical marker, an appropriate bilobe flap drawn around the defect.    The area thus outlined was incised deep to adipose tissue with a #15 scalpel blade.  The skin margins were undermined to an appropriate distance in all directions utilizing iris scissors.
Bilobed Transposition Flap Text: The defect edges were debeveled with a #15 scalpel blade.  Given the location of the defect and the proximity to free margins a bilobed transposition flap was deemed most appropriate.  Using a sterile surgical marker, an appropriate bilobe flap drawn around the defect.    The area thus outlined was incised deep to adipose tissue with a #15 scalpel blade.  The skin margins were undermined to an appropriate distance in all directions utilizing iris scissors.
Trilobed Flap Text: The defect edges were debeveled with a #15 scalpel blade.  Given the location of the defect and the proximity to free margins a trilobed flap was deemed most appropriate.  Using a sterile surgical marker, an appropriate trilobed flap drawn around the defect.    The area thus outlined was incised deep to adipose tissue with a #15 scalpel blade.  The skin margins were undermined to an appropriate distance in all directions utilizing iris scissors.
Dorsal Nasal Flap Text: The defect edges were debeveled with a #15 scalpel blade.  Given the location of the defect and the proximity to free margins a dorsal nasal flap was deemed most appropriate.  Using a sterile surgical marker, an appropriate dorsal nasal flap was drawn around the defect.    The area thus outlined was incised deep to adipose tissue with a #15 scalpel blade.  The skin margins were undermined to an appropriate distance in all directions utilizing iris scissors.
Island Pedicle Flap Text: The defect edges were debeveled with a #15 scalpel blade.  Given the location of the defect, shape of the defect and the proximity to free margins an island pedicle advancement flap was deemed most appropriate.  Using a sterile surgical marker, an appropriate advancement flap was drawn incorporating the defect, outlining the appropriate donor tissue and placing the expected incisions within the relaxed skin tension lines where possible.    The area thus outlined was incised deep to adipose tissue with a #15 scalpel blade.  The skin margins were undermined to an appropriate distance in all directions around the primary defect and laterally outward around the island pedicle utilizing iris scissors.  There was minimal undermining beneath the pedicle flap.
Island Pedicle Flap With Canthal Suspension Text: The defect edges were debeveled with a #15 scalpel blade.  Given the location of the defect, shape of the defect and the proximity to free margins an island pedicle advancement flap was deemed most appropriate.  Using a sterile surgical marker, an appropriate advancement flap was drawn incorporating the defect, outlining the appropriate donor tissue and placing the expected incisions within the relaxed skin tension lines where possible. The area thus outlined was incised deep to adipose tissue with a #15 scalpel blade.  The skin margins were undermined to an appropriate distance in all directions around the primary defect and laterally outward around the island pedicle utilizing iris scissors.  There was minimal undermining beneath the pedicle flap. A suspension suture was placed in the canthal tendon to prevent tension and prevent ectropion.
Alar Island Pedicle Flap Text: The defect edges were debeveled with a #15 scalpel blade.  Given the location of the defect, shape of the defect and the proximity to the alar rim an island pedicle advancement flap was deemed most appropriate.  Using a sterile surgical marker, an appropriate advancement flap was drawn incorporating the defect, outlining the appropriate donor tissue and placing the expected incisions within the nasal ala running parallel to the alar rim. The area thus outlined was incised with a #15 scalpel blade.  The skin margins were undermined minimally to an appropriate distance in all directions around the primary defect and laterally outward around the island pedicle utilizing iris scissors.  There was minimal undermining beneath the pedicle flap.
Double Island Pedicle Flap Text: The defect edges were debeveled with a #15 scalpel blade.  Given the location of the defect, shape of the defect and the proximity to free margins a double island pedicle advancement flap was deemed most appropriate.  Using a sterile surgical marker, an appropriate advancement flap was drawn incorporating the defect, outlining the appropriate donor tissue and placing the expected incisions within the relaxed skin tension lines where possible.    The area thus outlined was incised deep to adipose tissue with a #15 scalpel blade.  The skin margins were undermined to an appropriate distance in all directions around the primary defect and laterally outward around the island pedicle utilizing iris scissors.  There was minimal undermining beneath the pedicle flap.
Island Pedicle Flap-Requiring Vessel Identification Text: The defect edges were debeveled with a #15 scalpel blade.  Given the location of the defect, shape of the defect and the proximity to free margins an island pedicle advancement flap was deemed most appropriate.  Using a sterile surgical marker, an appropriate advancement flap was drawn, based on the axial vessel mentioned above, incorporating the defect, outlining the appropriate donor tissue and placing the expected incisions within the relaxed skin tension lines where possible.    The area thus outlined was incised deep to adipose tissue with a #15 scalpel blade.  The skin margins were undermined to an appropriate distance in all directions around the primary defect and laterally outward around the island pedicle utilizing iris scissors.  There was minimal undermining beneath the pedicle flap.
Keystone Flap Text: The defect edges were debeveled with a #15 scalpel blade.  Given the location of the defect, shape of the defect a keystone flap was deemed most appropriate.  Using a sterile surgical marker, an appropriate keystone flap was drawn incorporating the defect, outlining the appropriate donor tissue and placing the expected incisions within the relaxed skin tension lines where possible. The area thus outlined was incised deep to adipose tissue with a #15 scalpel blade.  The skin margins were undermined to an appropriate distance in all directions around the primary defect and laterally outward around the flap utilizing iris scissors.
O-T Plasty Text: The defect edges were debeveled with a #15 scalpel blade.  Given the location of the defect, shape of the defect and the proximity to free margins an O-T plasty was deemed most appropriate.  Using a sterile surgical marker, an appropriate O-T plasty was drawn incorporating the defect and placing the expected incisions within the relaxed skin tension lines where possible.    The area thus outlined was incised deep to adipose tissue with a #15 scalpel blade.  The skin margins were undermined to an appropriate distance in all directions utilizing iris scissors.
O-Z Plasty Text: The defect edges were debeveled with a #15 scalpel blade.  Given the location of the defect, shape of the defect and the proximity to free margins an O-Z plasty (double transposition flap) was deemed most appropriate.  Using a sterile surgical marker, the appropriate transposition flaps were drawn incorporating the defect and placing the expected incisions within the relaxed skin tension lines where possible.    The area thus outlined was incised deep to adipose tissue with a #15 scalpel blade.  The skin margins were undermined to an appropriate distance in all directions utilizing iris scissors.  Hemostasis was achieved with electrocautery.  The flaps were then transposed into place, one clockwise and the other counterclockwise, and anchored with interrupted buried subcutaneous sutures.
Double O-Z Plasty Text: The defect edges were debeveled with a #15 scalpel blade.  Given the location of the defect, shape of the defect and the proximity to free margins a Double O-Z plasty (double transposition flap) was deemed most appropriate.  Using a sterile surgical marker, the appropriate transposition flaps were drawn incorporating the defect and placing the expected incisions within the relaxed skin tension lines where possible. The area thus outlined was incised deep to adipose tissue with a #15 scalpel blade.  The skin margins were undermined to an appropriate distance in all directions utilizing iris scissors.  Hemostasis was achieved with electrocautery.  The flaps were then transposed into place, one clockwise and the other counterclockwise, and anchored with interrupted buried subcutaneous sutures.
S Plasty Text: Given the location and shape of the defect, and the orientation of relaxed skin tension lines, an S-plasty was deemed most appropriate for repair.  Using a sterile surgical marker, the appropriate outline of the S-plasty was drawn, incorporating the defect and placing the expected incisions within the relaxed skin tension lines where possible.  The area thus outlined was incised deep to adipose tissue with a #15 scalpel blade.  The skin margins were undermined to an appropriate distance in all directions utilizing iris scissors. The skin flaps were advanced over the defect.  The opposing margins were then approximated with interrupted buried subcutaneous sutures.
V-Y Plasty Text: The defect edges were debeveled with a #15 scalpel blade.  Given the location of the defect, shape of the defect and the proximity to free margins an V-Y advancement flap was deemed most appropriate.  Using a sterile surgical marker, an appropriate advancement flap was drawn incorporating the defect and placing the expected incisions within the relaxed skin tension lines where possible.    The area thus outlined was incised deep to adipose tissue with a #15 scalpel blade.  The skin margins were undermined to an appropriate distance in all directions utilizing iris scissors.
H Plasty Text: Given the location of the defect, shape of the defect and the proximity to free margins a H-plasty was deemed most appropriate for repair.  Using a sterile surgical marker, the appropriate advancement arms of the H-plasty were drawn incorporating the defect and placing the expected incisions within the relaxed skin tension lines where possible. The area thus outlined was incised deep to adipose tissue with a #15 scalpel blade. The skin margins were undermined to an appropriate distance in all directions utilizing iris scissors.  The opposing advancement arms were then advanced into place in opposite direction and anchored with interrupted buried subcutaneous sutures.
W Plasty Text: The lesion was extirpated to the level of the fat with a #15 scalpel blade.  Given the location of the defect, shape of the defect and the proximity to free margins a W-plasty was deemed most appropriate for repair.  Using a sterile surgical marker, the appropriate transposition arms of the W-plasty were drawn incorporating the defect and placing the expected incisions within the relaxed skin tension lines where possible.    The area thus outlined was incised deep to adipose tissue with a #15 scalpel blade.  The skin margins were undermined to an appropriate distance in all directions utilizing iris scissors.  The opposing transposition arms were then transposed into place in opposite direction and anchored with interrupted buried subcutaneous sutures.
Z Plasty Text: The lesion was extirpated to the level of the fat with a #15 scalpel blade.  Given the location of the defect, shape of the defect and the proximity to free margins a Z-plasty was deemed most appropriate for repair.  Using a sterile surgical marker, the appropriate transposition arms of the Z-plasty were drawn incorporating the defect and placing the expected incisions within the relaxed skin tension lines where possible.    The area thus outlined was incised deep to adipose tissue with a #15 scalpel blade.  The skin margins were undermined to an appropriate distance in all directions utilizing iris scissors.  The opposing transposition arms were then transposed into place in opposite direction and anchored with interrupted buried subcutaneous sutures.
Cheek Interpolation Flap Text: A decision was made to reconstruct the defect utilizing an interpolation axial flap and a staged reconstruction.  A telfa template was made of the defect.  This telfa template was then used to outline the Cheek Interpolation flap.  The donor area for the pedicle flap was then injected with anesthesia.  The flap was excised through the skin and subcutaneous tissue down to the layer of the underlying musculature.  The interpolation flap was carefully excised within this deep plane to maintain its blood supply.  The edges of the donor site were undermined.   The donor site was closed in a primary fashion.  The pedicle was then rotated into position and sutured.  Once the tube was sutured into place, adequate blood supply was confirmed with blanching and refill.  The pedicle was then wrapped with xeroform gauze and dressed appropriately with a telfa and gauze bandage to ensure continued blood supply and protect the attached pedicle.
Cheek-To-Nose Interpolation Flap Text: A decision was made to reconstruct the defect utilizing an interpolation axial flap and a staged reconstruction.  A telfa template was made of the defect.  This telfa template was then used to outline the Cheek-To-Nose Interpolation flap.  The donor area for the pedicle flap was then injected with anesthesia.  The flap was excised through the skin and subcutaneous tissue down to the layer of the underlying musculature.  The interpolation flap was carefully excised within this deep plane to maintain its blood supply.  The edges of the donor site were undermined.   The donor site was closed in a primary fashion.  The pedicle was then rotated into position and sutured.  Once the tube was sutured into place, adequate blood supply was confirmed with blanching and refill.  The pedicle was then wrapped with xeroform gauze and dressed appropriately with a telfa and gauze bandage to ensure continued blood supply and protect the attached pedicle.
Interpolation Flap Text: A decision was made to reconstruct the defect utilizing an interpolation axial flap and a staged reconstruction.  A telfa template was made of the defect.  This telfa template was then used to outline the interpolation flap.  The donor area for the pedicle flap was then injected with anesthesia.  The flap was excised through the skin and subcutaneous tissue down to the layer of the underlying musculature.  The interpolation flap was carefully excised within this deep plane to maintain its blood supply.  The edges of the donor site were undermined.   The donor site was closed in a primary fashion.  The pedicle was then rotated into position and sutured.  Once the tube was sutured into place, adequate blood supply was confirmed with blanching and refill.  The pedicle was then wrapped with xeroform gauze and dressed appropriately with a telfa and gauze bandage to ensure continued blood supply and protect the attached pedicle.
Melolabial Interpolation Flap Text: A decision was made to reconstruct the defect utilizing an interpolation axial flap and a staged reconstruction.  A telfa template was made of the defect.  This telfa template was then used to outline the melolabial interpolation flap.  The donor area for the pedicle flap was then injected with anesthesia.  The flap was excised through the skin and subcutaneous tissue down to the layer of the underlying musculature.  The pedicle flap was carefully excised within this deep plane to maintain its blood supply.  The edges of the donor site were undermined.   The donor site was closed in a primary fashion.  The pedicle was then rotated into position and sutured.  Once the tube was sutured into place, adequate blood supply was confirmed with blanching and refill.  The pedicle was then wrapped with xeroform gauze and dressed appropriately with a telfa and gauze bandage to ensure continued blood supply and protect the attached pedicle.
Mastoid Interpolation Flap Text: A decision was made to reconstruct the defect utilizing an interpolation axial flap and a staged reconstruction.  A telfa template was made of the defect.  This telfa template was then used to outline the mastoid interpolation flap.  The donor area for the pedicle flap was then injected with anesthesia.  The flap was excised through the skin and subcutaneous tissue down to the layer of the underlying musculature.  The pedicle flap was carefully excised within this deep plane to maintain its blood supply.  The edges of the donor site were undermined.   The donor site was closed in a primary fashion.  The pedicle was then rotated into position and sutured.  Once the tube was sutured into place, adequate blood supply was confirmed with blanching and refill.  The pedicle was then wrapped with xeroform gauze and dressed appropriately with a telfa and gauze bandage to ensure continued blood supply and protect the attached pedicle.
Posterior Auricular Interpolation Flap Text: A decision was made to reconstruct the defect utilizing an interpolation axial flap and a staged reconstruction.  A telfa template was made of the defect.  This telfa template was then used to outline the posterior auricular interpolation flap.  The donor area for the pedicle flap was then injected with anesthesia.  The flap was excised through the skin and subcutaneous tissue down to the layer of the underlying musculature.  The pedicle flap was carefully excised within this deep plane to maintain its blood supply.  The edges of the donor site were undermined.   The donor site was closed in a primary fashion.  The pedicle was then rotated into position and sutured.  Once the tube was sutured into place, adequate blood supply was confirmed with blanching and refill.  The pedicle was then wrapped with xeroform gauze and dressed appropriately with a telfa and gauze bandage to ensure continued blood supply and protect the attached pedicle.
Paramedian Forehead Flap Text: A decision was made to reconstruct the defect utilizing an interpolation axial flap and a staged reconstruction.  A telfa template was made of the defect.  This telfa template was then used to outline the paramedian forehead pedicle flap.  The donor area for the pedicle flap was then injected with anesthesia.  The flap was excised through the skin and subcutaneous tissue down to the layer of the underlying musculature.  The pedicle flap was carefully excised within this deep plane to maintain its blood supply.  The edges of the donor site were undermined.   The donor site was closed in a primary fashion.  The pedicle was then rotated into position and sutured.  Once the tube was sutured into place, adequate blood supply was confirmed with blanching and refill.  The pedicle was then wrapped with xeroform gauze and dressed appropriately with a telfa and gauze bandage to ensure continued blood supply and protect the attached pedicle.
Cheiloplasty (Less Than 50%) Text: A decision was made to reconstruct the defect with a  cheiloplasty.  The defect was undermined extensively.  Additional obicularis oris muscle was excised with a 15 blade scalpel.  The defect was converted into a full thickness wedge, of less than 50% of the vertical height of the lip, to facilite a better cosmetic result.  Small vessels were then tied off with 5-0 monocyrl. The obicularis oris, superficial fascia, adipose and dermis were then reapproximated.  After the deeper layers were approximated the epidermis was reapproximated with particular care given to realign the vermilion border.
Cheiloplasty (Complex) Text: A decision was made to reconstruct the defect with a  cheiloplasty.  The defect was undermined extensively.  Additional obicularis oris muscle was excised with a 15 blade scalpel.  The defect was converted into a full thickness wedge to facilite a better cosmetic result.  Small vessels were then tied off with 5-0 monocyrl. The obicularis oris, superficial fascia, adipose and dermis were then reapproximated.  After the deeper layers were approximated the epidermis was reapproximated with particular care given to realign the vermilion border.
Ear Wedge Repair Text: A wedge excision was completed by carrying down an excision through the full thickness of the ear and cartilage with an inward facing Burow's triangle. The wound was then closed in a layered fashion.
Full Thickness Lip Wedge Repair (Flap) Text: Given the location of the defect and the proximity to free margins a full thickness wedge repair was deemed most appropriate.  Using a sterile surgical marker, the appropriate repair was drawn incorporating the defect and placing the expected incisions perpendicular to the vermilion border.  The vermilion border was also meticulously outlined to ensure appropriate reapproximation during the repair.  The area thus outlined was incised through and through with a #15 scalpel blade.  The muscularis and dermis were reaproximated with deep sutures following hemostasis. Care was taken to realign the vermilion border before proceeding with the superficial closure.  Once the vermilion was realigned the superfical and mucosal closure was finished.
Ftsg Text: The defect edges were debeveled with a #15 scalpel blade.  Given the location of the defect, shape of the defect and the proximity to free margins a full thickness skin graft was deemed most appropriate.  Using a sterile surgical marker, the primary defect shape was transferred to the donor site. The area thus outlined was incised deep to adipose tissue with a #15 scalpel blade.  The harvested graft was then trimmed of adipose tissue until only dermis and epidermis was left.  The skin margins of the secondary defect were undermined to an appropriate distance in all directions utilizing iris scissors.  The secondary defect was closed with interrupted buried subcutaneous sutures.  The skin edges were then re-apposed with running  sutures.  The skin graft was then placed in the primary defect and oriented appropriately.
Split-Thickness Skin Graft Text: The defect edges were debeveled with a #15 scalpel blade.  Given the location of the defect, shape of the defect and the proximity to free margins a split thickness skin graft was deemed most appropriate.  Using a sterile surgical marker, the primary defect shape was transferred to the donor site. The split thickness graft was then harvested.  The skin graft was then placed in the primary defect and oriented appropriately.
Cartilage Graft Text: The defect edges were debeveled with a #15 scalpel blade.  Given the location of the defect, shape of the defect, the fact the defect involved a full thickness cartilage defect a cartilage graft was deemed most appropriate.  An appropriate donor site was identified, cleansed, and anesthetized. The cartilage graft was then harvested and transferred to the recipient site, oriented appropriately and then sutured into place.  The secondary defect was then repaired using a primary closure.
Composite Graft Text: The defect edges were debeveled with a #15 scalpel blade.  Given the location of the defect, shape of the defect, the proximity to free margins and the fact the defect was full thickness a composite graft was deemed most appropriate.  The defect was outline and then transferred to the donor site.  A full thickness graft was then excised from the donor site. The graft was then placed in the primary defect, oriented appropriately and then sutured into place.  The secondary defect was then repaired using a primary closure.
Epidermal Autograft Text: The defect edges were debeveled with a #15 scalpel blade.  Given the location of the defect, shape of the defect and the proximity to free margins an epidermal autograft was deemed most appropriate.  Using a sterile surgical marker, the primary defect shape was transferred to the donor site. The epidermal graft was then harvested.  The skin graft was then placed in the primary defect and oriented appropriately.
Dermal Autograft Text: The defect edges were debeveled with a #15 scalpel blade.  Given the location of the defect, shape of the defect and the proximity to free margins a dermal autograft was deemed most appropriate.  Using a sterile surgical marker, the primary defect shape was transferred to the donor site. The area thus outlined was incised deep to adipose tissue with a #15 scalpel blade.  The harvested graft was then trimmed of adipose and epidermal tissue until only dermis was left.  The skin graft was then placed in the primary defect and oriented appropriately.
Skin Substitute Text: The defect edges were debeveled with a #15 scalpel blade.  Given the location of the defect, shape of the defect and the proximity to free margins a skin substitute graft was deemed most appropriate.  The graft material was trimmed to fit the size of the defect. The graft was then placed in the primary defect and oriented appropriately.
Tissue Cultured Epidermal Autograft Text: The defect edges were debeveled with a #15 scalpel blade.  Given the location of the defect, shape of the defect and the proximity to free margins a tissue cultured epidermal autograft was deemed most appropriate.  The graft was then trimmed to fit the size of the defect.  The graft was then placed in the primary defect and oriented appropriately.
Xenograft Text: The defect edges were debeveled with a #15 scalpel blade.  Given the location of the defect, shape of the defect and the proximity to free margins a xenograft was deemed most appropriate.  The graft was then trimmed to fit the size of the defect.  The graft was then placed in the primary defect and oriented appropriately.
Purse String (Simple) Text: Given the location of the defect and the characteristics of the surrounding skin a purse string closure was deemed most appropriate.  Undermining was performed circumfirentially around the surgical defect.  A purse string suture was then placed and tightened.
Purse String (Intermediate) Text: Given the location of the defect and the characteristics of the surrounding skin a purse string intermediate closure was deemed most appropriate.  Undermining was performed circumfirentially around the surgical defect.  A purse string suture was then placed and tightened.
Partial Purse String (Simple) Text: Given the location of the defect and the characteristics of the surrounding skin a simple purse string closure was deemed most appropriate.  Undermining was performed circumfirentially around the surgical defect.  A purse string suture was then placed and tightened. Wound tension only allowed a partial closure of the circular defect.
Partial Purse String (Intermediate) Text: Given the location of the defect and the characteristics of the surrounding skin an intermediate purse string closure was deemed most appropriate.  Undermining was performed circumfirentially around the surgical defect.  A purse string suture was then placed and tightened. Wound tension only allowed a partial closure of the circular defect.
Localized Dermabrasion Text: The patient was draped in routine manner.  Localized dermabrasion using 3 x 17 mm wire brush was performed in routine manner to papillary dermis. This spot dermabrasion is being performed to complete skin cancer reconstruction. It also will eliminate the other sun damaged precancerous cells that are known to be part of the regional effect of a lifetime's worth of sun exposure. This localized dermabrasion is therapeutic and should not be considered cosmetic in any regard.
Tarsorrhaphy Text: A tarsorrhaphy was performed using Frost sutures.
Complex Repair And Flap Additional Text (Will Appearing After The Standard Complex Repair Text): The complex repair was not sufficient to completely close the primary defect. The remaining additional defect was repaired with the flap mentioned below.
Complex Repair And Graft Additional Text (Will Appearing After The Standard Complex Repair Text): The complex repair was not sufficient to completely close the primary defect. The remaining additional defect was repaired with the graft mentioned below.
Manual Repair Warning Statement: We plan on removing the manually selected variable below in favor of our much easier automatic structured text blocks found in the previous tab. We decided to do this to help make the flow better and give you the full power of structured data. Manual selection is never going to be ideal in our platform and I would encourage you to avoid using manual selection from this point on, especially since I will be sunsetting this feature. It is important that you do one of two things with the customized text below. First, you can save all of the text in a word file so you can have it for future reference. Second, transfer the text to the appropriate area in the Library tab. Lastly, if there is a flap or graft type which we do not have you need to let us know right away so I can add it in before the variable is hidden. No need to panic, we plan to give you roughly 6 months to make the change.
Same Histology In Subsequent Stages Text: The pattern and morphology of the tumor is as described in the first stage.
No Residual Tumor Seen Histology Text: There were no malignant cells seen in the sections examined.
Inflammation Suggestive Of Cancer Camouflage Histology Text: There was a dense lymphocytic infiltrate which prevented adequate histologic evaluation of adjacent structures.
Bcc Histology Text: There were numerous aggregates of basaloid cells.
Bcc Infiltrative Histology Text: There were numerous aggregates of basaloid cells demonstrating an infiltrative pattern.
Mart-1 - Positive Histology Text: MART-1 staining demonstrates areas of higher density and clustering of melanocytes with Pagetoid spread upwards within the epidermis. The surgical margins are positive for tumor cells.
Mart-1 - Negative Histology Text: MART-1 staining demonstrates a normal density and pattern of melanocytes along the dermal-epidermal junction. The surgical margins are negative for tumor cells.
Information: Selecting Yes will display possible errors in your note based on the variables you have selected. This validation is only offered as a suggestion for you. PLEASE NOTE THAT THE VALIDATION TEXT WILL BE REMOVED WHEN YOU FINALIZE YOUR NOTE. IF YOU WANT TO FAX A PRELIMINARY NOTE YOU WILL NEED TO TOGGLE THIS TO 'NO' IF YOU DO NOT WANT IT IN YOUR FAXED NOTE.

## 2020-03-13 NOTE — PROCEDURE: REPAIR NOTE
Anesthesia Volume In Cc: 5
Repair Type: Complex Repair
Include Suturegard In Note?: No
Suturegard Retention Suture: 2-0 Nylon
Retention Suture Bite Size: 3 mm
Length To Time In Minutes Device Was In Place: 10
Simple / Intermediate / Complex Repair - Final Wound Length In Cm: 2.5
Complex Requirements: Extensive Undermining Performed?: Yes
Width Of Defect Perpendicular To Closure In Cm (Required): 1.4
Undermining Type: Entire Wound
Debridement Text: The wound edges were debrided prior to proceeding with the closure to facilitate wound healing.
Helical Rim Text: The closure involved the helical rim.
Vermilion Border Text: The closure involved the vermilion border.
Nostril Rim Text: The closure involved the nostril rim.
Retention Suture Text: Retention sutures were placed to support the closure and prevent dehiscence.
Primary Defect Length (In Cm): 1.8
Primary Defect Width (In Cm): 1.2
Secondary Defect Length (In Cm): 0
Skin Substitute: EpiFix Micronized
Suture Removal: 14 days
Type Of Previous Surgery (Optional- Ie Mohs Surgery): Mohs
Date Of Previous Surgery (Optional): 03/13/2020
Number Of Stages Required To Clear Tumor (Optional): 1
Detail Level: Detailed
Anesthesia Type: 1% lidocaine with 1:100,000 epinephrine and a 1:10 solution of 8.4% sodium bicarbonate
Additional Anesthesia Volume In Cc: 6
Hemostasis: Thermal Cautery
Estimated Blood Loss (Cc): minimal
Deep Sutures: 3-0 Vicryl
Additional Deep Sutures: 4-0 Vicryl
Epidermal Sutures: 4-0 Ethilon
Epidermal Closure: running and interrupted
Wound Care: Petrolatum
Dressing: pressure dressing with telfa
Unna Boot Text: An Unna boot was placed to help immobilize the limb and facilitate more rapid healing.
Suturegard Intro: Intraoperative tissue expansion was performed, utilizing the SUTUREGARD device, in order to reduce wound tension.
Suturegard Body: The suture ends were repeatedly re-tightened and re-clamped to achieve the desired tissue expansion.
Epidermal Closure Graft Donor Site (Optional): simple interrupted
Graft Donor Site Bandage (Optional-Leave Blank If You Don't Want In Note): Steri-strips and a pressure bandage were applied to the donor site.
Closure 2 Information: This tab is for additional flaps and grafts, including complex repair and grafts and complex repair and flaps. You can also specify a different location for the additional defect, if the location is the same you do not need to select a new one. We will insert the automated text for the repair you select below just as we do for solitary flaps and grafts. Please note that at this time if you select a location with a different insurance zone you will need to override the ICD10 and CPT if appropriate.
Closure 3 Information: This tab is for additional flaps and grafts above and beyond our usual structured repairs.  Please note if you enter information here it will not currently bill and you will need to add the billing information manually.
Closure 4 Information: This tab is for additional flaps and grafts above and beyond our usual structured repairs.  Please note if you enter information here it will not currently bill and you will need to add the billing information manually.
Complex Repair Preamble Text (Leave Blank If You Do Not Want): Extensive wide undermining was performed.
Intermediate Repair Preamble Text (Leave Blank If You Do Not Want): Undermining was performed with blunt dissection.
Non-Graft Cartilage Fenestration Text: The cartilage was fenestrated with a 2mm punch biopsy to help facilitate healing.
Graft Cartilage Fenestration Text: The cartilage was fenestrated with a 2mm punch biopsy to help facilitate graft survival and healing.
Secondary Intention Text (Leave Blank If You Do Not Want): The defect will heal with secondary intention.
No Repair - Repaired With Adjacent Surgical Defect Text (Leave Blank If You Do Not Want): After obtaining clear surgical margins the defect was repaired concurrently with another surgical defect which was in close approximation.
Referred To Oculoplastics For Closure Text (Leave Blank If You Do Not Want): After obtaining clear surgical margins the patient was sent to oculoplastics for surgical repair.  The patient understands they will receive post-surgical care and follow-up from the referring physician's office.
Referred To Otolaryngology For Closure Text (Leave Blank If You Do Not Want): After obtaining clear surgical margins the patient was sent to otolaryngology for surgical repair.  The patient understands they will receive post-surgical care and follow-up from the referring physician's office.
Referred To Plastics For Closure Text (Leave Blank If You Do Not Want): After obtaining clear surgical margins the patient was sent to plastics for surgical repair.  The patient understands they will receive post-surgical care and follow-up from the referring physician's office.
Referred To Asc For Closure Text (Leave Blank If You Do Not Want): After obtaining clear surgical margins the patient was sent to an ASC for surgical repair.  The patient understands they will receive post-surgical care and follow-up from the ASC physician.
Referred To Mid-Level For Closure Text (Leave Blank If You Do Not Want): After obtaining clear surgical margins the patient was sent to a mid-level provider for surgical repair.  The patient understands they will receive post-surgical care and follow-up from the mid-level provider.
Repair Performed By Another Provider Text (Leave Blank If You Do Not Want): After obtaining clear surgical margins the defect was repaired by another provider.
Advancement Flap (Single) Text: The defect edges were debeveled with a #15 scalpel blade.  Given the location of the defect and the proximity to free margins a single advancement flap was deemed most appropriate.  Using a sterile surgical marker, an appropriate advancement flap was drawn incorporating the defect and placing the expected incisions within the relaxed skin tension lines where possible.    The area thus outlined was incised deep to adipose tissue with a #15 scalpel blade.  The skin margins were undermined to an appropriate distance in all directions utilizing iris scissors.
Advancement Flap (Double) Text: The defect edges were debeveled with a #15 scalpel blade.  Given the location of the defect and the proximity to free margins a double advancement flap was deemed most appropriate.  Using a sterile surgical marker, the appropriate advancement flaps were drawn incorporating the defect and placing the expected incisions within the relaxed skin tension lines where possible.    The area thus outlined was incised deep to adipose tissue with a #15 scalpel blade.  The skin margins were undermined to an appropriate distance in all directions utilizing iris scissors.
Burow's Advancement Flap Text: The defect edges were debeveled with a #15 scalpel blade.  Given the location of the defect and the proximity to free margins a Burow's advancement flap was deemed most appropriate.  Using a sterile surgical marker, the appropriate advancement flap was drawn incorporating the defect and placing the expected incisions within the relaxed skin tension lines where possible.    The area thus outlined was incised deep to adipose tissue with a #15 scalpel blade.  The skin margins were undermined to an appropriate distance in all directions utilizing iris scissors.
Chonodrocutaneous Helical Advancement Flap Text: The defect edges were debeveled with a #15 scalpel blade.  Given the location of the defect and the proximity to free margins a chondrocutaneous helical advancement flap was deemed most appropriate.  Using a sterile surgical marker, the appropriate advancement flap was drawn incorporating the defect and placing the expected incisions within the relaxed skin tension lines where possible.    The area thus outlined was incised deep to adipose tissue with a #15 scalpel blade.  The skin margins were undermined to an appropriate distance in all directions utilizing iris scissors.
Crescentic Advancement Flap Text: The defect edges were debeveled with a #15 scalpel blade.  Given the location of the defect and the proximity to free margins a crescentic advancement flap was deemed most appropriate.  Using a sterile surgical marker, the appropriate advancement flap was drawn incorporating the defect and placing the expected incisions within the relaxed skin tension lines where possible.    The area thus outlined was incised deep to adipose tissue with a #15 scalpel blade.  The skin margins were undermined to an appropriate distance in all directions utilizing iris scissors.
A-T Advancement Flap Text: The defect edges were debeveled with a #15 scalpel blade.  Given the location of the defect, shape of the defect and the proximity to free margins an A-T advancement flap was deemed most appropriate.  Using a sterile surgical marker, an appropriate advancement flap was drawn incorporating the defect and placing the expected incisions within the relaxed skin tension lines where possible.    The area thus outlined was incised deep to adipose tissue with a #15 scalpel blade.  The skin margins were undermined to an appropriate distance in all directions utilizing iris scissors.
O-T Advancement Flap Text: The defect edges were debeveled with a #15 scalpel blade.  Given the location of the defect, shape of the defect and the proximity to free margins an O-T advancement flap was deemed most appropriate.  Using a sterile surgical marker, an appropriate advancement flap was drawn incorporating the defect and placing the expected incisions within the relaxed skin tension lines where possible.    The area thus outlined was incised deep to adipose tissue with a #15 scalpel blade.  The skin margins were undermined to an appropriate distance in all directions utilizing iris scissors.
O-L Flap Text: The defect edges were debeveled with a #15 scalpel blade.  Given the location of the defect, shape of the defect and the proximity to free margins an O-L flap was deemed most appropriate.  Using a sterile surgical marker, an appropriate advancement flap was drawn incorporating the defect and placing the expected incisions within the relaxed skin tension lines where possible.    The area thus outlined was incised deep to adipose tissue with a #15 scalpel blade.  The skin margins were undermined to an appropriate distance in all directions utilizing iris scissors.
O-Z Flap Text: The defect edges were debeveled with a #15 scalpel blade.  Given the location of the defect, shape of the defect and the proximity to free margins an O-Z flap was deemed most appropriate.  Using a sterile surgical marker, an appropriate transposition flap was drawn incorporating the defect and placing the expected incisions within the relaxed skin tension lines where possible. The area thus outlined was incised deep to adipose tissue with a #15 scalpel blade.  The skin margins were undermined to an appropriate distance in all directions utilizing iris scissors.
Double O-Z Flap Text: The defect edges were debeveled with a #15 scalpel blade.  Given the location of the defect, shape of the defect and the proximity to free margins a Double O-Z flap was deemed most appropriate.  Using a sterile surgical marker, an appropriate transposition flap was drawn incorporating the defect and placing the expected incisions within the relaxed skin tension lines where possible. The area thus outlined was incised deep to adipose tissue with a #15 scalpel blade.  The skin margins were undermined to an appropriate distance in all directions utilizing iris scissors.
V-Y Flap Text: The defect edges were debeveled with a #15 scalpel blade.  Given the location of the defect, shape of the defect and the proximity to free margins a V-Y flap was deemed most appropriate.  Using a sterile surgical marker, an appropriate advancement flap was drawn incorporating the defect and placing the expected incisions within the relaxed skin tension lines where possible.    The area thus outlined was incised deep to adipose tissue with a #15 scalpel blade.  The skin margins were undermined to an appropriate distance in all directions utilizing iris scissors.
Advancement-Rotation Flap Text: The defect edges were debeveled with a #15 scalpel blade.  Given the location of the defect, shape of the defect and the proximity to free margins an advancement-rotation flap was deemed most appropriate.  Using a sterile surgical marker, an appropriate flap was drawn incorporating the defect and placing the expected incisions within the relaxed skin tension lines where possible. The area thus outlined was incised deep to adipose tissue with a #15 scalpel blade.  The skin margins were undermined to an appropriate distance in all directions utilizing iris scissors.
Mercedes Flap Text: The defect edges were debeveled with a #15 scalpel blade.  Given the location of the defect, shape of the defect and the proximity to free margins a Mercedes flap was deemed most appropriate.  Using a sterile surgical marker, an appropriate advancement flap was drawn incorporating the defect and placing the expected incisions within the relaxed skin tension lines where possible. The area thus outlined was incised deep to adipose tissue with a #15 scalpel blade.  The skin margins were undermined to an appropriate distance in all directions utilizing iris scissors.
Modified Advancement Flap Text: The defect edges were debeveled with a #15 scalpel blade.  Given the location of the defect, shape of the defect and the proximity to free margins a modified advancement flap was deemed most appropriate.  Using a sterile surgical marker, an appropriate advancement flap was drawn incorporating the defect and placing the expected incisions within the relaxed skin tension lines where possible.    The area thus outlined was incised deep to adipose tissue with a #15 scalpel blade.  The skin margins were undermined to an appropriate distance in all directions utilizing iris scissors.
Mucosal Advancement Flap Text: Given the location of the defect, shape of the defect and the proximity to free margins a mucosal advancement flap was deemed most appropriate. Incisions were made with a 15 blade scalpel in the appropriate fashion along the cutaneous vermilion border and the mucosal lip. The remaining actinically damaged mucosal tissue was excised.  The mucosal advancement flap was then elevated to the gingival sulcus with care taken to preserve the neurovascular structures and advanced into the primary defect. Care was taken to ensure that precise realignment of the vermilion border was achieved.
Hatchet Flap Text: The defect edges were debeveled with a #15 scalpel blade.  Given the location of the defect, shape of the defect and the proximity to free margins a hatchet flap was deemed most appropriate.  Using a sterile surgical marker, an appropriate hatchet flap was drawn incorporating the defect and placing the expected incisions within the relaxed skin tension lines where possible.    The area thus outlined was incised deep to adipose tissue with a #15 scalpel blade.  The skin margins were undermined to an appropriate distance in all directions utilizing iris scissors.
Rotation Flap Text: The defect edges were debeveled with a #15 scalpel blade.  Given the location of the defect, shape of the defect and the proximity to free margins a rotation flap was deemed most appropriate.  Using a sterile surgical marker, an appropriate rotation flap was drawn incorporating the defect and placing the expected incisions within the relaxed skin tension lines where possible.    The area thus outlined was incised deep to adipose tissue with a #15 scalpel blade.  The skin margins were undermined to an appropriate distance in all directions utilizing iris scissors.
Spiral Flap Text: The defect edges were debeveled with a #15 scalpel blade.  Given the location of the defect, shape of the defect and the proximity to free margins a spiral flap was deemed most appropriate.  Using a sterile surgical marker, an appropriate rotation flap was drawn incorporating the defect and placing the expected incisions within the relaxed skin tension lines where possible. The area thus outlined was incised deep to adipose tissue with a #15 scalpel blade.  The skin margins were undermined to an appropriate distance in all directions utilizing iris scissors.
Star Wedge Flap Text: The defect edges were debeveled with a #15 scalpel blade.  Given the location of the defect, shape of the defect and the proximity to free margins a star wedge flap was deemed most appropriate.  Using a sterile surgical marker, an appropriate rotation flap was drawn incorporating the defect and placing the expected incisions within the relaxed skin tension lines where possible. The area thus outlined was incised deep to adipose tissue with a #15 scalpel blade.  The skin margins were undermined to an appropriate distance in all directions utilizing iris scissors.
Transposition Flap Text: The defect edges were debeveled with a #15 scalpel blade.  Given the location of the defect and the proximity to free margins a transposition flap was deemed most appropriate.  Using a sterile surgical marker, an appropriate transposition flap was drawn incorporating the defect.    The area thus outlined was incised deep to adipose tissue with a #15 scalpel blade.  The skin margins were undermined to an appropriate distance in all directions utilizing iris scissors.
Muscle Hinge Flap Text: The defect edges were debeveled with a #15 scalpel blade.  Given the size, depth and location of the defect and the proximity to free margins a muscle hinge flap was deemed most appropriate.  Using a sterile surgical marker, an appropriate hinge flap was drawn incorporating the defect. The area thus outlined was incised with a #15 scalpel blade.  The skin margins were undermined to an appropriate distance in all directions utilizing iris scissors.
Melolabial Transposition Flap Text: The defect edges were debeveled with a #15 scalpel blade.  Given the location of the defect and the proximity to free margins a melolabial flap was deemed most appropriate.  Using a sterile surgical marker, an appropriate melolabial transposition flap was drawn incorporating the defect.    The area thus outlined was incised deep to adipose tissue with a #15 scalpel blade.  The skin margins were undermined to an appropriate distance in all directions utilizing iris scissors.
Rhombic Flap Text: The defect edges were debeveled with a #15 scalpel blade.  Given the location of the defect and the proximity to free margins a rhombic flap was deemed most appropriate.  Using a sterile surgical marker, an appropriate rhombic flap was drawn incorporating the defect.    The area thus outlined was incised deep to adipose tissue with a #15 scalpel blade.  The skin margins were undermined to an appropriate distance in all directions utilizing iris scissors.
Rhomboid Transposition Flap Text: The defect edges were debeveled with a #15 scalpel blade.  Given the location of the defect and the proximity to free margins a rhomboid transposition flap was deemed most appropriate.  Using a sterile surgical marker, an appropriate rhomboid flap was drawn incorporating the defect.    The area thus outlined was incised deep to adipose tissue with a #15 scalpel blade.  The skin margins were undermined to an appropriate distance in all directions utilizing iris scissors.
Bi-Rhombic Flap Text: The defect edges were debeveled with a #15 scalpel blade.  Given the location of the defect and the proximity to free margins a bi-rhombic flap was deemed most appropriate.  Using a sterile surgical marker, an appropriate rhombic flap was drawn incorporating the defect. The area thus outlined was incised deep to adipose tissue with a #15 scalpel blade.  The skin margins were undermined to an appropriate distance in all directions utilizing iris scissors.
Helical Rim Advancement Flap Text: The defect edges were debeveled with a #15 blade scalpel.  Given the location of the defect and the proximity to free margins (helical rim) a double helical rim advancement flap was deemed most appropriate.  Using a sterile surgical marker, the appropriate advancement flaps were drawn incorporating the defect and placing the expected incisions between the helical rim and antihelix where possible.  The area thus outlined was incised through and through with a #15 scalpel blade.  With a skin hook and iris scissors, the flaps were gently and sharply undermined and freed up.
Bilateral Helical Rim Advancement Flap Text: The defect edges were debeveled with a #15 blade scalpel.  Given the location of the defect and the proximity to free margins (helical rim) a bilateral helical rim advancement flap was deemed most appropriate.  Using a sterile surgical marker, the appropriate advancement flaps were drawn incorporating the defect and placing the expected incisions between the helical rim and antihelix where possible.  The area thus outlined was incised through and through with a #15 scalpel blade.  With a skin hook and iris scissors, the flaps were gently and sharply undermined and freed up.
Ear Star Wedge Flap Text: The defect edges were debeveled with a #15 blade scalpel.  Given the location of the defect and the proximity to free margins (helical rim) an ear star wedge flap was deemed most appropriate.  Using a sterile surgical marker, the appropriate flap was drawn incorporating the defect and placing the expected incisions between the helical rim and antihelix where possible.  The area thus outlined was incised through and through with a #15 scalpel blade.
Banner Transposition Flap Text: The defect edges were debeveled with a #15 scalpel blade.  Given the location of the defect and the proximity to free margins a Banner transposition flap was deemed most appropriate.  Using a sterile surgical marker, an appropriate flap drawn around the defect. The area thus outlined was incised deep to adipose tissue with a #15 scalpel blade.  The skin margins were undermined to an appropriate distance in all directions utilizing iris scissors.
Bilobed Flap Text: The defect edges were debeveled with a #15 scalpel blade.  Given the location of the defect and the proximity to free margins a bilobe flap was deemed most appropriate.  Using a sterile surgical marker, an appropriate bilobe flap drawn around the defect.    The area thus outlined was incised deep to adipose tissue with a #15 scalpel blade.  The skin margins were undermined to an appropriate distance in all directions utilizing iris scissors.
Bilobed Transposition Flap Text: The defect edges were debeveled with a #15 scalpel blade.  Given the location of the defect and the proximity to free margins a bilobed transposition flap was deemed most appropriate.  Using a sterile surgical marker, an appropriate bilobe flap drawn around the defect.    The area thus outlined was incised deep to adipose tissue with a #15 scalpel blade.  The skin margins were undermined to an appropriate distance in all directions utilizing iris scissors.
Trilobed Flap Text: The defect edges were debeveled with a #15 scalpel blade.  Given the location of the defect and the proximity to free margins a trilobed flap was deemed most appropriate.  Using a sterile surgical marker, an appropriate trilobed flap drawn around the defect.    The area thus outlined was incised deep to adipose tissue with a #15 scalpel blade.  The skin margins were undermined to an appropriate distance in all directions utilizing iris scissors.
Dorsal Nasal Flap Text: The defect edges were debeveled with a #15 scalpel blade.  Given the location of the defect and the proximity to free margins a dorsal nasal flap was deemed most appropriate.  Using a sterile surgical marker, an appropriate dorsal nasal flap was drawn around the defect.    The area thus outlined was incised deep to adipose tissue with a #15 scalpel blade.  The skin margins were undermined to an appropriate distance in all directions utilizing iris scissors.
Island Pedicle Flap Text: The defect edges were debeveled with a #15 scalpel blade.  Given the location of the defect, shape of the defect and the proximity to free margins an island pedicle advancement flap was deemed most appropriate.  Using a sterile surgical marker, an appropriate advancement flap was drawn incorporating the defect, outlining the appropriate donor tissue and placing the expected incisions within the relaxed skin tension lines where possible.    The area thus outlined was incised deep to adipose tissue with a #15 scalpel blade.  The skin margins were undermined to an appropriate distance in all directions around the primary defect and laterally outward around the island pedicle utilizing iris scissors.  There was minimal undermining beneath the pedicle flap.
Island Pedicle Flap With Canthal Suspension Text: The defect edges were debeveled with a #15 scalpel blade.  Given the location of the defect, shape of the defect and the proximity to free margins an island pedicle advancement flap was deemed most appropriate.  Using a sterile surgical marker, an appropriate advancement flap was drawn incorporating the defect, outlining the appropriate donor tissue and placing the expected incisions within the relaxed skin tension lines where possible. The area thus outlined was incised deep to adipose tissue with a #15 scalpel blade.  The skin margins were undermined to an appropriate distance in all directions around the primary defect and laterally outward around the island pedicle utilizing iris scissors.  There was minimal undermining beneath the pedicle flap. A suspension suture was placed in the canthal tendon to prevent tension and prevent ectropion.
Alar Island Pedicle Flap Text: The defect edges were debeveled with a #15 scalpel blade.  Given the location of the defect, shape of the defect and the proximity to the alar rim an island pedicle advancement flap was deemed most appropriate.  Using a sterile surgical marker, an appropriate advancement flap was drawn incorporating the defect, outlining the appropriate donor tissue and placing the expected incisions within the nasal ala running parallel to the alar rim. The area thus outlined was incised with a #15 scalpel blade.  The skin margins were undermined minimally to an appropriate distance in all directions around the primary defect and laterally outward around the island pedicle utilizing iris scissors.  There was minimal undermining beneath the pedicle flap.
Double Island Pedicle Flap Text: The defect edges were debeveled with a #15 scalpel blade.  Given the location of the defect, shape of the defect and the proximity to free margins a double island pedicle advancement flap was deemed most appropriate.  Using a sterile surgical marker, an appropriate advancement flap was drawn incorporating the defect, outlining the appropriate donor tissue and placing the expected incisions within the relaxed skin tension lines where possible.    The area thus outlined was incised deep to adipose tissue with a #15 scalpel blade.  The skin margins were undermined to an appropriate distance in all directions around the primary defect and laterally outward around the island pedicle utilizing iris scissors.  There was minimal undermining beneath the pedicle flap.
Island Pedicle Flap-Requiring Vessel Identification Text: The defect edges were debeveled with a #15 scalpel blade.  Given the location of the defect, shape of the defect and the proximity to free margins an island pedicle advancement flap was deemed most appropriate.  Using a sterile surgical marker, an appropriate advancement flap was drawn, based on the axial vessel mentioned above, incorporating the defect, outlining the appropriate donor tissue and placing the expected incisions within the relaxed skin tension lines where possible.    The area thus outlined was incised deep to adipose tissue with a #15 scalpel blade.  The skin margins were undermined to an appropriate distance in all directions around the primary defect and laterally outward around the island pedicle utilizing iris scissors.  There was minimal undermining beneath the pedicle flap.
Keystone Flap Text: The defect edges were debeveled with a #15 scalpel blade.  Given the location of the defect, shape of the defect a keystone flap was deemed most appropriate.  Using a sterile surgical marker, an appropriate keystone flap was drawn incorporating the defect, outlining the appropriate donor tissue and placing the expected incisions within the relaxed skin tension lines where possible. The area thus outlined was incised deep to adipose tissue with a #15 scalpel blade.  The skin margins were undermined to an appropriate distance in all directions around the primary defect and laterally outward around the flap utilizing iris scissors.
O-T Plasty Text: The defect edges were debeveled with a #15 scalpel blade.  Given the location of the defect, shape of the defect and the proximity to free margins an O-T plasty was deemed most appropriate.  Using a sterile surgical marker, an appropriate O-T plasty was drawn incorporating the defect and placing the expected incisions within the relaxed skin tension lines where possible.    The area thus outlined was incised deep to adipose tissue with a #15 scalpel blade.  The skin margins were undermined to an appropriate distance in all directions utilizing iris scissors.
O-Z Plasty Text: The defect edges were debeveled with a #15 scalpel blade.  Given the location of the defect, shape of the defect and the proximity to free margins an O-Z plasty (double transposition flap) was deemed most appropriate.  Using a sterile surgical marker, the appropriate transposition flaps were drawn incorporating the defect and placing the expected incisions within the relaxed skin tension lines where possible.    The area thus outlined was incised deep to adipose tissue with a #15 scalpel blade.  The skin margins were undermined to an appropriate distance in all directions utilizing iris scissors.  Hemostasis was achieved with electrocautery.  The flaps were then transposed into place, one clockwise and the other counterclockwise, and anchored with interrupted buried subcutaneous sutures.
Double O-Z Plasty Text: The defect edges were debeveled with a #15 scalpel blade.  Given the location of the defect, shape of the defect and the proximity to free margins a Double O-Z plasty (double transposition flap) was deemed most appropriate.  Using a sterile surgical marker, the appropriate transposition flaps were drawn incorporating the defect and placing the expected incisions within the relaxed skin tension lines where possible. The area thus outlined was incised deep to adipose tissue with a #15 scalpel blade.  The skin margins were undermined to an appropriate distance in all directions utilizing iris scissors.  Hemostasis was achieved with electrocautery.  The flaps were then transposed into place, one clockwise and the other counterclockwise, and anchored with interrupted buried subcutaneous sutures.
S Plasty Text: Given the location and shape of the defect, and the orientation of relaxed skin tension lines, an S-plasty was deemed most appropriate for repair.  Using a sterile surgical marker, the appropriate outline of the S-plasty was drawn, incorporating the defect and placing the expected incisions within the relaxed skin tension lines where possible.  The area thus outlined was incised deep to adipose tissue with a #15 scalpel blade.  The skin margins were undermined to an appropriate distance in all directions utilizing iris scissors. The skin flaps were advanced over the defect.  The opposing margins were then approximated with interrupted buried subcutaneous sutures.
V-Y Plasty Text: The defect edges were debeveled with a #15 scalpel blade.  Given the location of the defect, shape of the defect and the proximity to free margins an V-Y advancement flap was deemed most appropriate.  Using a sterile surgical marker, an appropriate advancement flap was drawn incorporating the defect and placing the expected incisions within the relaxed skin tension lines where possible.    The area thus outlined was incised deep to adipose tissue with a #15 scalpel blade.  The skin margins were undermined to an appropriate distance in all directions utilizing iris scissors.
H Plasty Text: Given the location of the defect, shape of the defect and the proximity to free margins a H-plasty was deemed most appropriate for repair.  Using a sterile surgical marker, the appropriate advancement arms of the H-plasty were drawn incorporating the defect and placing the expected incisions within the relaxed skin tension lines where possible. The area thus outlined was incised deep to adipose tissue with a #15 scalpel blade. The skin margins were undermined to an appropriate distance in all directions utilizing iris scissors.  The opposing advancement arms were then advanced into place in opposite direction and anchored with interrupted buried subcutaneous sutures.
W Plasty Text: The lesion was extirpated to the level of the fat with a #15 scalpel blade.  Given the location of the defect, shape of the defect and the proximity to free margins a W-plasty was deemed most appropriate for repair.  Using a sterile surgical marker, the appropriate transposition arms of the W-plasty were drawn incorporating the defect and placing the expected incisions within the relaxed skin tension lines where possible.    The area thus outlined was incised deep to adipose tissue with a #15 scalpel blade.  The skin margins were undermined to an appropriate distance in all directions utilizing iris scissors.  The opposing transposition arms were then transposed into place in opposite direction and anchored with interrupted buried subcutaneous sutures.
Z Plasty Text: The lesion was extirpated to the level of the fat with a #15 scalpel blade.  Given the location of the defect, shape of the defect and the proximity to free margins a Z-plasty was deemed most appropriate for repair.  Using a sterile surgical marker, the appropriate transposition arms of the Z-plasty were drawn incorporating the defect and placing the expected incisions within the relaxed skin tension lines where possible.    The area thus outlined was incised deep to adipose tissue with a #15 scalpel blade.  The skin margins were undermined to an appropriate distance in all directions utilizing iris scissors.  The opposing transposition arms were then transposed into place in opposite direction and anchored with interrupted buried subcutaneous sutures.
Cheek Interpolation Flap Text: A decision was made to reconstruct the defect utilizing an interpolation axial flap and a staged reconstruction.  A telfa template was made of the defect.  This telfa template was then used to outline the Cheek Interpolation flap.  The donor area for the pedicle flap was then injected with anesthesia.  The flap was excised through the skin and subcutaneous tissue down to the layer of the underlying musculature.  The interpolation flap was carefully excised within this deep plane to maintain its blood supply.  The edges of the donor site were undermined.   The donor site was closed in a primary fashion.  The pedicle was then rotated into position and sutured.  Once the tube was sutured into place, adequate blood supply was confirmed with blanching and refill.  The pedicle was then wrapped with xeroform gauze and dressed appropriately with a telfa and gauze bandage to ensure continued blood supply and protect the attached pedicle.
Cheek-To-Nose Interpolation Flap Text: A decision was made to reconstruct the defect utilizing an interpolation axial flap and a staged reconstruction.  A telfa template was made of the defect.  This telfa template was then used to outline the Cheek-To-Nose Interpolation flap.  The donor area for the pedicle flap was then injected with anesthesia.  The flap was excised through the skin and subcutaneous tissue down to the layer of the underlying musculature.  The interpolation flap was carefully excised within this deep plane to maintain its blood supply.  The edges of the donor site were undermined.   The donor site was closed in a primary fashion.  The pedicle was then rotated into position and sutured.  Once the tube was sutured into place, adequate blood supply was confirmed with blanching and refill.  The pedicle was then wrapped with xeroform gauze and dressed appropriately with a telfa and gauze bandage to ensure continued blood supply and protect the attached pedicle.
Interpolation Flap Text: A decision was made to reconstruct the defect utilizing an interpolation axial flap and a staged reconstruction.  A telfa template was made of the defect.  This telfa template was then used to outline the interpolation flap.  The donor area for the pedicle flap was then injected with anesthesia.  The flap was excised through the skin and subcutaneous tissue down to the layer of the underlying musculature.  The interpolation flap was carefully excised within this deep plane to maintain its blood supply.  The edges of the donor site were undermined.   The donor site was closed in a primary fashion.  The pedicle was then rotated into position and sutured.  Once the tube was sutured into place, adequate blood supply was confirmed with blanching and refill.  The pedicle was then wrapped with xeroform gauze and dressed appropriately with a telfa and gauze bandage to ensure continued blood supply and protect the attached pedicle.
Melolabial Interpolation Flap Text: A decision was made to reconstruct the defect utilizing an interpolation axial flap and a staged reconstruction.  A telfa template was made of the defect.  This telfa template was then used to outline the melolabial interpolation flap.  The donor area for the pedicle flap was then injected with anesthesia.  The flap was excised through the skin and subcutaneous tissue down to the layer of the underlying musculature.  The pedicle flap was carefully excised within this deep plane to maintain its blood supply.  The edges of the donor site were undermined.   The donor site was closed in a primary fashion.  The pedicle was then rotated into position and sutured.  Once the tube was sutured into place, adequate blood supply was confirmed with blanching and refill.  The pedicle was then wrapped with xeroform gauze and dressed appropriately with a telfa and gauze bandage to ensure continued blood supply and protect the attached pedicle.
Mastoid Interpolation Flap Text: A decision was made to reconstruct the defect utilizing an interpolation axial flap and a staged reconstruction.  A telfa template was made of the defect.  This telfa template was then used to outline the mastoid interpolation flap.  The donor area for the pedicle flap was then injected with anesthesia.  The flap was excised through the skin and subcutaneous tissue down to the layer of the underlying musculature.  The pedicle flap was carefully excised within this deep plane to maintain its blood supply.  The edges of the donor site were undermined.   The donor site was closed in a primary fashion.  The pedicle was then rotated into position and sutured.  Once the tube was sutured into place, adequate blood supply was confirmed with blanching and refill.  The pedicle was then wrapped with xeroform gauze and dressed appropriately with a telfa and gauze bandage to ensure continued blood supply and protect the attached pedicle.
Posterior Auricular Interpolation Flap Text: A decision was made to reconstruct the defect utilizing an interpolation axial flap and a staged reconstruction.  A telfa template was made of the defect.  This telfa template was then used to outline the posterior auricular interpolation flap.  The donor area for the pedicle flap was then injected with anesthesia.  The flap was excised through the skin and subcutaneous tissue down to the layer of the underlying musculature.  The pedicle flap was carefully excised within this deep plane to maintain its blood supply.  The edges of the donor site were undermined.   The donor site was closed in a primary fashion.  The pedicle was then rotated into position and sutured.  Once the tube was sutured into place, adequate blood supply was confirmed with blanching and refill.  The pedicle was then wrapped with xeroform gauze and dressed appropriately with a telfa and gauze bandage to ensure continued blood supply and protect the attached pedicle.
Paramedian Forehead Flap Text: A decision was made to reconstruct the defect utilizing an interpolation axial flap and a staged reconstruction.  A telfa template was made of the defect.  This telfa template was then used to outline the paramedian forehead pedicle flap.  The donor area for the pedicle flap was then injected with anesthesia.  The flap was excised through the skin and subcutaneous tissue down to the layer of the underlying musculature.  The pedicle flap was carefully excised within this deep plane to maintain its blood supply.  The edges of the donor site were undermined.   The donor site was closed in a primary fashion.  The pedicle was then rotated into position and sutured.  Once the tube was sutured into place, adequate blood supply was confirmed with blanching and refill.  The pedicle was then wrapped with xeroform gauze and dressed appropriately with a telfa and gauze bandage to ensure continued blood supply and protect the attached pedicle.
Cheiloplasty (Less Than 50%) Text: A decision was made to reconstruct the defect with a  cheiloplasty.  The defect was undermined extensively.  Additional obicularis oris muscle was excised with a 15 blade scalpel.  The defect was converted into a full thickness wedge, of less than 50% of the vertical height of the lip, to facilite a better cosmetic result.  Small vessels were then tied off with 5-0 monocyrl. The obicularis oris, superficial fascia, adipose and dermis were then reapproximated.  After the deeper layers were approximated the epidermis was reapproximated with particular care given to realign the vermilion border.
Cheiloplasty (Complex) Text: A decision was made to reconstruct the defect with a  cheiloplasty.  The defect was undermined extensively.  Additional obicularis oris muscle was excised with a 15 blade scalpel.  The defect was converted into a full thickness wedge to facilite a better cosmetic result.  Small vessels were then tied off with 5-0 monocyrl. The obicularis oris, superficial fascia, adipose and dermis were then reapproximated.  After the deeper layers were approximated the epidermis was reapproximated with particular care given to realign the vermilion border.
Ear Wedge Repair Text: A wedge excision was completed by carrying down an excision through the full thickness of the ear and cartilage with an inward facing Burow's triangle. The wound was then closed in a layered fashion.
Full Thickness Lip Wedge Repair (Flap) Text: Given the location of the defect and the proximity to free margins a full thickness wedge repair was deemed most appropriate.  Using a sterile surgical marker, the appropriate repair was drawn incorporating the defect and placing the expected incisions perpendicular to the vermilion border.  The vermilion border was also meticulously outlined to ensure appropriate reapproximation during the repair.  The area thus outlined was incised through and through with a #15 scalpel blade.  The muscularis and dermis were reaproximated with deep sutures following hemostasis. Care was taken to realign the vermilion border before proceeding with the superficial closure.  Once the vermilion was realigned the superfical and mucosal closure was finished.
Ftsg Text: The defect edges were debeveled with a #15 scalpel blade.  Given the location of the defect, shape of the defect and the proximity to free margins a full thickness skin graft was deemed most appropriate.  Using a sterile surgical marker, the primary defect shape was transferred to the donor site. The area thus outlined was incised deep to adipose tissue with a #15 scalpel blade.  The harvested graft was then trimmed of adipose tissue until only dermis and epidermis was left.  The skin margins of the secondary defect were undermined to an appropriate distance in all directions utilizing iris scissors.  The secondary defect was closed with interrupted buried subcutaneous sutures.  The skin edges were then re-apposed with running  sutures.  The skin graft was then placed in the primary defect and oriented appropriately.
Split-Thickness Skin Graft Text: The defect edges were debeveled with a #15 scalpel blade.  Given the location of the defect, shape of the defect and the proximity to free margins a split thickness skin graft was deemed most appropriate.  Using a sterile surgical marker, the primary defect shape was transferred to the donor site. The split thickness graft was then harvested.  The skin graft was then placed in the primary defect and oriented appropriately.
Cartilage Graft Text: The defect edges were debeveled with a #15 scalpel blade.  Given the location of the defect, shape of the defect, the fact the defect involved a full thickness cartilage defect a cartilage graft was deemed most appropriate.  An appropriate donor site was identified, cleansed, and anesthetized. The cartilage graft was then harvested and transferred to the recipient site, oriented appropriately and then sutured into place.  The secondary defect was then repaired using a primary closure.
Composite Graft Text: The defect edges were debeveled with a #15 scalpel blade.  Given the location of the defect, shape of the defect, the proximity to free margins and the fact the defect was full thickness a composite graft was deemed most appropriate.  The defect was outline and then transferred to the donor site.  A full thickness graft was then excised from the donor site. The graft was then placed in the primary defect, oriented appropriately and then sutured into place.  The secondary defect was then repaired using a primary closure.
Epidermal Autograft Text: The defect edges were debeveled with a #15 scalpel blade.  Given the location of the defect, shape of the defect and the proximity to free margins an epidermal autograft was deemed most appropriate.  Using a sterile surgical marker, the primary defect shape was transferred to the donor site. The epidermal graft was then harvested.  The skin graft was then placed in the primary defect and oriented appropriately.
Dermal Autograft Text: The defect edges were debeveled with a #15 scalpel blade.  Given the location of the defect, shape of the defect and the proximity to free margins a dermal autograft was deemed most appropriate.  Using a sterile surgical marker, the primary defect shape was transferred to the donor site. The area thus outlined was incised deep to adipose tissue with a #15 scalpel blade.  The harvested graft was then trimmed of adipose and epidermal tissue until only dermis was left.  The skin graft was then placed in the primary defect and oriented appropriately.
Skin Substitute Text: The defect edges were debeveled with a #15 scalpel blade.  Given the location of the defect, shape of the defect and the proximity to free margins a skin substitute graft was deemed most appropriate.  The graft material was trimmed to fit the size of the defect. The graft was then placed in the primary defect and oriented appropriately.
Skin Substitute Paste Text: The defect edges were debeveled with a #15 scalpel blade.  Given the location of the defect, shape of the defect and the proximity to free margins a skin substitute micronized graft was deemed most appropriate.  The entire vial contents were admixed with 0.5ccs of sterile saline, formed into a paste and then evenly spread over the entire wound bed.
Skin Substitute Injection Text: The defect edges were debeveled with a #15 scalpel blade.  Given the location of the defect, shape of the defect and the proximity to free margins a skin substitute micronized graft was deemed most appropriate.  The entire vial contents were admixed with 3.0ccs of sterile saline and then injected subcutaneously throughout the entire wound bed.
Tissue Cultured Epidermal Autograft Text: The defect edges were debeveled with a #15 scalpel blade.  Given the location of the defect, shape of the defect and the proximity to free margins a tissue cultured epidermal autograft was deemed most appropriate.  The graft was then trimmed to fit the size of the defect.  The graft was then placed in the primary defect and oriented appropriately.
Xenograft Text: The defect edges were debeveled with a #15 scalpel blade.  Given the location of the defect, shape of the defect and the proximity to free margins a xenograft was deemed most appropriate.  The graft was then trimmed to fit the size of the defect.  The graft was then placed in the primary defect and oriented appropriately.
Purse String (Simple) Text: Given the location of the defect and the characteristics of the surrounding skin a purse string closure was deemed most appropriate.  Undermining was performed circumfirentially around the surgical defect.  A purse string suture was then placed and tightened.
Purse String (Intermediate) Text: Given the location of the defect and the characteristics of the surrounding skin a purse string intermediate closure was deemed most appropriate.  Undermining was performed circumfirentially around the surgical defect.  A purse string suture was then placed and tightened.
Partial Purse String (Simple) Text: Given the location of the defect and the characteristics of the surrounding skin a simple purse string closure was deemed most appropriate.  Undermining was performed circumfirentially around the surgical defect.  A purse string suture was then placed and tightened. Wound tension only allowed a partial closure of the circular defect.
Partial Purse String (Intermediate) Text: Given the location of the defect and the characteristics of the surrounding skin an intermediate purse string closure was deemed most appropriate.  Undermining was performed circumfirentially around the surgical defect.  A purse string suture was then placed and tightened. Wound tension only allowed a partial closure of the circular defect.
Localized Dermabrasion Text: The patient was draped in routine manner.  Localized dermabrasion using 3 x 17 mm wire brush was performed in routine manner to papillary dermis. This spot dermabrasion is being performed to complete skin cancer reconstruction. It also will eliminate the other sun damaged precancerous cells that are known to be part of the regional effect of a lifetime's worth of sun exposure. This localized dermabrasion is therapeutic and should not be considered cosmetic in any regard.
Tarsorrhaphy Text: A tarsorrhaphy was performed using Frost sutures.
Complex Repair And Flap Additional Text (Will Appearing After The Standard Complex Repair Text): The complex repair was not sufficient to completely close the primary defect. The remaining additional defect was repaired with the flap mentioned below.
Complex Repair And Graft Additional Text (Will Appearing After The Standard Complex Repair Text): The complex repair was not sufficient to completely close the primary defect. The remaining additional defect was repaired with the graft mentioned below.
Cheek Interpolation Flap Division And Inset Text: Division and inset of the cheek interpolation flap was performed to achieve optimal aesthetic result, restore normal anatomic appearance and avoid distortion of normal anatomy, expedite and facilitate wound healing, achieve optimal functional result and because linear closure either not possible or would produce suboptimal result. The patient was prepped and draped in the usual manner. The pedicle was infiltrated with local anesthesia. The pedicle was sectioned with a #15 blade. The pedicle was de-bulked and trimmed to match the shape of the defect. Hemostasis was achieved. The flap donor site and free margin of the flap were secured with deep buried sutures and the wound edges were re-approximated.
Cheek To Nose Interpolation Flap Division And Inset Text: Division and inset of the cheek to nose interpolation flap was performed to achieve optimal aesthetic result, restore normal anatomic appearance and avoid distortion of normal anatomy, expedite and facilitate wound healing, achieve optimal functional result and because linear closure either not possible or would produce suboptimal result. The patient was prepped and draped in the usual manner. The pedicle was infiltrated with local anesthesia. The pedicle was sectioned with a #15 blade. The pedicle was de-bulked and trimmed to match the shape of the defect. Hemostasis was achieved. The flap donor site and free margin of the flap were secured with deep buried sutures and the wound edges were re-approximated.
Melolabial Interpolation Flap Division And Inset Text: Division and inset of the melolabial interpolation flap was performed to achieve optimal aesthetic result, restore normal anatomic appearance and avoid distortion of normal anatomy, expedite and facilitate wound healing, achieve optimal functional result and because linear closure either not possible or would produce suboptimal result. The patient was prepped and draped in the usual manner. The pedicle was infiltrated with local anesthesia. The pedicle was sectioned with a #15 blade. The pedicle was de-bulked and trimmed to match the shape of the defect. Hemostasis was achieved. The flap donor site and free margin of the flap were secured with deep buried sutures and the wound edges were re-approximated.
Mastoid Interpolation Flap Division And Inset Text: Division and inset of the mastoid interpolation flap was performed to achieve optimal aesthetic result, restore normal anatomic appearance and avoid distortion of normal anatomy, expedite and facilitate wound healing, achieve optimal functional result and because linear closure either not possible or would produce suboptimal result. The patient was prepped and draped in the usual manner. The pedicle was infiltrated with local anesthesia. The pedicle was sectioned with a #15 blade. The pedicle was de-bulked and trimmed to match the shape of the defect. Hemostasis was achieved. The flap donor site and free margin of the flap were secured with deep buried sutures and the wound edges were re-approximated.
Paramedian Forehead Flap Division And Inset Text: Division and inset of the paramedian forehead flap was performed to achieve optimal aesthetic result, restore normal anatomic appearance and avoid distortion of normal anatomy, expedite and facilitate wound healing, achieve optimal functional result and because linear closure either not possible or would produce suboptimal result. The patient was prepped and draped in the usual manner. The pedicle was infiltrated with local anesthesia. The pedicle was sectioned with a #15 blade. The pedicle was de-bulked and trimmed to match the shape of the defect. Hemostasis was achieved. The flap donor site and free margin of the flap were secured with deep buried sutures and the wound edges were re-approximated.
Posterior Auricular Interpolation Flap Division And Inset Text: Division and inset of the posterior auricular interpolation flap was performed to achieve optimal aesthetic result, restore normal anatomic appearance and avoid distortion of normal anatomy, expedite and facilitate wound healing, achieve optimal functional result and because linear closure either not possible or would produce suboptimal result. The patient was prepped and draped in the usual manner. The pedicle was infiltrated with local anesthesia. The pedicle was sectioned with a #15 blade. The pedicle was de-bulked and trimmed to match the shape of the defect. Hemostasis was achieved. The flap donor site and free margin of the flap were secured with deep buried sutures and the wound edges were re-approximated.
Manual Repair Warning Statement: We plan on removing the manually selected variable below in favor of our much easier automatic structured text blocks found in the previous tab. We decided to do this to help make the flow better and give you the full power of structured data. Manual selection is never going to be ideal in our platform and I would encourage you to avoid using manual selection from this point on, especially since I will be sunsetting this feature. It is important that you do one of two things with the customized text below. First, you can save all of the text in a word file so you can have it for future reference. Second, transfer the text to the appropriate area in the Library tab. Lastly, if there is a flap or graft type which we do not have you need to let us know right away so I can add it in before the variable is hidden. No need to panic, we plan to give you roughly 6 months to make the change.
Consent: The rationale for the repair was explained to the patient and consent was obtained. The risks, benefits and alternatives to therapy were discussed in detail. Specifically, the risks of infection, scarring, bleeding, prolonged wound healing, incomplete removal, allergy to anesthesia, nerve injury and recurrence were addressed. Prior to the procedure, the treatment site was clearly identified and confirmed by the patient. All components of Universal Protocol/PAUSE Rule completed.
Post-Care Instructions: I reviewed with the patient in detail post-care instructions. Patient is not to engage in any heavy lifting, exercise, or swimming for the next 7 days. Should the patient develop any fevers, chills, bleeding, severe pain patient will contact the office immediately.
Information: Selecting Yes will display possible errors in your note based on the variables you have selected. This validation is only offered as a suggestion for you. PLEASE NOTE THAT THE VALIDATION TEXT WILL BE REMOVED WHEN YOU FINALIZE YOUR NOTE. IF YOU WANT TO FAX A PRELIMINARY NOTE YOU WILL NEED TO TOGGLE THIS TO 'NO' IF YOU DO NOT WANT IT IN YOUR FAXED NOTE.

## 2020-04-01 PROBLEM — Z48.02 ENCOUNTER FOR REMOVAL OF SUTURES: Status: ACTIVE | Noted: 2020-01-01

## 2020-04-01 NOTE — PROCEDURE: SUTURE REMOVAL (GLOBAL PERIOD)
Detail Level: Detailed
Add 98137 Cpt? (Important Note: In 2017 The Use Of 51977 Is Being Tracked By Cms To Determine Future Global Period Reimbursement For Global Periods): yes

## 2020-05-11 ENCOUNTER — HOSPITAL ENCOUNTER (OUTPATIENT)
Dept: HOSPITAL 35 - TELEPC | Age: 75
End: 2020-05-11
Attending: CLINICAL NURSE SPECIALIST
Payer: COMMERCIAL

## 2020-05-11 DIAGNOSIS — Z79.899: ICD-10-CM

## 2020-05-11 DIAGNOSIS — Z88.2: ICD-10-CM

## 2020-05-11 DIAGNOSIS — Z88.5: ICD-10-CM

## 2020-05-11 DIAGNOSIS — M47.816: Primary | ICD-10-CM

## 2020-05-11 DIAGNOSIS — F11.20: ICD-10-CM

## 2020-05-11 DIAGNOSIS — Z88.8: ICD-10-CM

## 2020-05-11 DIAGNOSIS — M81.0: ICD-10-CM

## 2020-05-11 DIAGNOSIS — Z88.0: ICD-10-CM

## 2020-05-11 DIAGNOSIS — J44.9: ICD-10-CM

## 2020-05-11 DIAGNOSIS — M48.061: ICD-10-CM

## 2020-05-11 DIAGNOSIS — M19.90: ICD-10-CM

## 2020-05-12 NOTE — HPC
[facility]
[address] [place]   [address]                     PAIN MANAGEMENT CONSULTATION  
_______________________________________________________________________________
 
Name:       EBER AVERY                     Room #:                     REG CLI 
JAMAAL#:      [id number]                       Account #:      [id number]  
Admission:  05/11/20    Attend Phys:    Nancy Brown     
Discharge:              Date of Birth:  [birth year]  
                                                          Report #: [id number]
                                                                    [id number]   
_______________________________________________________________________________
THIS REPORT FOR:  
 
cc:  Yvon Vazquez MD, Neal A. MD Hocker,Nancy ROME                                            ~
CC: Albert Randolph MD
    
DATE OF SERVICE:  05/11/2020
 
 
This is a tele-med appointment that the patient has agreed upon to do via the
telephone.  The patient states he has no audiovisual capability on his home
computer.  We spoke from [phone] hours for this tele-med appointment for his
medication refill.
 
CHIEF COMPLAINT:  Low back pain with spondylosis and osteoarthritis.
 
HISTORY OF PRESENT ILLNESS:  This is a very pleasant 74-year-old gentleman who I
am speaking with via the telephone for his tele-med appointment for refill of
his opioid medications that he takes for his ongoing low back pain and mid back
pain.  He also complains of some hand, wrist and shoulder pain as well as
bilateral knee pain with his osteoarthritis.  He states that he typically takes
3 tablets every day of his oxycodone tablets, but occasionally will require a
fourth pill in the middle of the night if he is up.  Today, he is rating his
pain a 4/10, is an aching pain that he feels is well controlled with his current
medication regimen.  He states he does walk from his chair to the steps, uses a
chairlift to take him upstairs to his bed.  He walks unassisted at that time to
his bathroom.  Typically, he does use a walker, but is unable to get into his
bathroom in the upstairs hallways.  He states he does continue to wear oxygen at
3 liters while sitting down and when he is active, he does increase it to 4-5
liters per nasal cannula.  Today, he is requesting a refill of his opioid
medications that he does not experience any daytime somnolence or constipation
issues as a result of.
 
ALLERGIES:  CODEINE, TAZOBACTAM, PIPERACILLIN AND GABAPENTIN.
 
CURRENT MEDICINES:  Metoprolol, potassium, oxycodone 5/325, ____ inhaler,
Protonix, ProAir, Lipitor, iron, aspirin, Plavix, sertraline, Imdur, Demadex,
and Voltaren gel.
 
PQRS:
1.  He has osteoarthritis of multiple joints that is diffuse.  He denies any
rheumatoid arthritis.
2.  Height, weight, and vital signs are deferred due to this being until a
tele-med conference.
3.  Pain score is 4/10.
 
 
 
[address]   [address]                     PAIN MANAGEMENT CONSULTATION  
_______________________________________________________________________________
 
Name:       EBER AVERY                     Room #:                     REG HERLINDA HINTON#:      [id number]                       Account #:      [id number]  
Admission:  05/11/20    Attend Phys:    Nancy Brown     
Discharge:              Date of Birth:  [birth year]  
                                                          Report #: [id number]
                                                                    [id number]   
_______________________________________________________________________________
4.  Denies dizziness.  He needs assistance with walking, uses a walker and a
chair lift.  He is on Plavix.  He also takes medicine for hypertension.
5.  Opioid therapy is greater than 6 weeks; therefore, an opioid signed contract
is on the chart.  Risk assessment is moderate.  Functional assessment 6-7.
6.  Recreational drug use, he denies.  He is a former smoker and does not drink
alcohol.
 
According to the prescription monitoring system, the patient is due to fill his
medications next week, filling them in a timely fashion from Dr. Albert Randolph.
 He does also take some lorazepam on a very p.r.n. basis and has been taking
this medicine combination for quite some time and is stable.  According to the
CDC guidelines, his morphine mEq per day is 35.
 
REVIEW OF SYSTEMS:  Due to the tele-med, he is alert and orientated 74-year-old
answering all my questions appropriately, in complete sentences.  He is a good
historian.  He reports to me that he has generalized aches and pains in numerous
joints and across his mid and low back.  He does use a walker for ambulation.
 
IMPRESSION:
1.  Severe chronic obstructive pulmonary disease with supplemental oxygen.
2.  Chronic low back pain with spondylosis and spinal stenosis.
3.  Osteoarthritis of multiple joints.
4.  Recent stents, on anticoagulation therapy.
5.  Osteoporosis.
6.  Management of high risk medications under written opioid agreement.
 
We reviewed the fact that opiate medications are being used to provide analgesia
adequate to support activities of daily living, not attempting to achieve a
specific pain score on the 0-10 Visual Analog Scale.  The current opiate
medications are providing sufficient analgesia to allow the patient to
participate in activities of daily living.  The patient is not exhibiting any
aberrant behavior suggestive of drug diversion.  The patient is not having any
adverse reactions to medications.  The patient is not suffering from daytime
somnolence or mental acuity changes.  The patient is managing opiate-induced
constipation with appropriate over-the-counter agents and dietary
considerations.  The patient was counseled on concern for caution with operating
a motor vehicle while using opiate medications.
 
PLAN:
1.  We discussed treatment options with the patient today.  The patient finds
his medications very beneficial.  He takes an average of 3 every day with
occasionally taking the fourth depending on activity.  He states he has no
problems with constipation or other side effects as a result of his medications.
2.  We will send these medications for 120 of oxycodone 5/325 to his local
pharmacy electronically by Dr. Albert Randolph for 2 months, then I instructed
the patient we will need to see him.  We have not seen him since January since
 
 
 
[facility]
[address]
[place]   [address]                     PAIN MANAGEMENT CONSULTATION  
_______________________________________________________________________________
 
Name:       EBER AVERY                     Room #:                     REG DONALD HINTON#:      [id number]                       Account #:      [id number]  
Admission:  05/11/20    Attend Phys:    Nancy Brown     
Discharge:              Date of Birth:  [birth year]  
                                                          Report #: [id number]
                                                                    [id number]   
_______________________________________________________________________________
the COVID virus since the patient is very immunocompromised and has significant
respiratory issues.  He has been safer at home.  We are hopeful that by July we
will be able to have an in-person appointment.  The patient verbalizes
understanding.
3.  The patient care given in collaboration with Dr. Albert Randolph today.
 
 
 
 
 
 
 
 
 
 
 
 
 
 
 
 
 
 
 
 
 
 
 
 
 
 
 
 
 
 
 
 
 
 
 
 
 
 
 
  <ELECTRONICALLY SIGNED>
   By: Nancy Brown             
  05/12/20     1117
D: 05/11/20 1316                           _____________________________________
T: 05/11/20 1540                           Nancy Brown               /nt

## 2020-05-18 PROBLEM — D04.39 CARCINOMA IN SITU OF SKIN OF OTHER PARTS OF FACE: Status: ACTIVE | Noted: 2020-01-01

## 2020-05-18 NOTE — PROCEDURE: TREATMENT REGIMEN
Plan: Start effudex as directed, twice a day, send photo after 4 weeks of treatment for evaluation.
Detail Level: Detailed

## 2020-07-13 ENCOUNTER — HOSPITAL ENCOUNTER (OUTPATIENT)
Dept: HOSPITAL 35 - PAIN | Age: 75
End: 2020-07-13
Attending: CLINICAL NURSE SPECIALIST
Payer: COMMERCIAL

## 2020-07-13 DIAGNOSIS — Z79.899: ICD-10-CM

## 2020-07-13 DIAGNOSIS — Z79.01: ICD-10-CM

## 2020-07-13 DIAGNOSIS — F11.20: ICD-10-CM

## 2020-07-13 DIAGNOSIS — M47.816: Primary | ICD-10-CM

## 2020-07-13 DIAGNOSIS — M48.061: ICD-10-CM

## 2020-07-13 DIAGNOSIS — M81.0: ICD-10-CM

## 2020-07-13 DIAGNOSIS — Z88.8: ICD-10-CM

## 2020-07-13 DIAGNOSIS — J44.9: ICD-10-CM

## 2020-07-14 ENCOUNTER — HOSPITAL ENCOUNTER (EMERGENCY)
Dept: HOSPITAL 35 - ER | Age: 75
End: 2020-07-14
Payer: COMMERCIAL

## 2020-07-14 VITALS — HEIGHT: 70 IN | BODY MASS INDEX: 27.2 KG/M2 | WEIGHT: 189.99 LBS

## 2020-07-14 DIAGNOSIS — J44.9: ICD-10-CM

## 2020-07-14 DIAGNOSIS — Z88.5: ICD-10-CM

## 2020-07-14 DIAGNOSIS — R79.89: ICD-10-CM

## 2020-07-14 DIAGNOSIS — N18.9: ICD-10-CM

## 2020-07-14 DIAGNOSIS — I13.0: ICD-10-CM

## 2020-07-14 DIAGNOSIS — E78.5: ICD-10-CM

## 2020-07-14 DIAGNOSIS — Z79.899: ICD-10-CM

## 2020-07-14 DIAGNOSIS — I50.9: ICD-10-CM

## 2020-07-14 DIAGNOSIS — Z88.8: ICD-10-CM

## 2020-07-14 DIAGNOSIS — Z88.1: ICD-10-CM

## 2020-07-14 DIAGNOSIS — I46.9: Primary | ICD-10-CM

## 2020-07-14 DIAGNOSIS — Z79.82: ICD-10-CM

## 2020-07-14 DIAGNOSIS — J96.90: ICD-10-CM

## 2020-07-14 LAB
ALBUMIN SERPL-MCNC: 3.3 G/DL (ref 3.4–5)
ALT SERPL-CCNC: 75 U/L (ref 30–65)
ANION GAP SERPL CALC-SCNC: 14 MMOL/L (ref 7–16)
AST SERPL-CCNC: 94 U/L (ref 15–37)
BE(VIVO): -10.3 MMOL/L
BILIRUB DIRECT SERPL-MCNC: 0.1 MG/DL
BILIRUB SERPL-MCNC: 0.3 MG/DL (ref 0.2–1)
BUN SERPL-MCNC: 27 MG/DL (ref 7–18)
CALCIUM SERPL-MCNC: 8.2 MG/DL (ref 8.5–10.1)
CHLORIDE SERPL-SCNC: 105 MMOL/L (ref 98–107)
CO2 SERPL-SCNC: 25 MMOL/L (ref 21–32)
CREAT SERPL-MCNC: 2.1 MG/DL (ref 0.7–1.3)
ERYTHROCYTE [DISTWIDTH] IN BLOOD BY AUTOMATED COUNT: 15.6 % (ref 10.5–14.5)
GLUCOSE SERPL-MCNC: 287 MG/DL (ref 74–106)
GRANULOCYTES NFR BLD MANUAL: 74 % (ref 36–66)
HCO3 BLD-SCNC: 18 MMOL/L (ref 22–26)
HCT VFR BLD CALC: 30.2 % (ref 42–52)
HGB BLD-MCNC: 9.3 GM/DL (ref 14–18)
LYMPHOCYTES NFR BLD AUTO: 14 % (ref 24–44)
MCH RBC QN AUTO: 29 PG (ref 26–34)
MCHC RBC AUTO-ENTMCNC: 31 G/DL (ref 28–37)
MCV RBC: 93.7 FL (ref 80–100)
METAMYELOCYTES NFR BLD: 1 %
MONOCYTES NFR BLD: 7 % (ref 1–8)
NEUTROPHILS # BLD: 17.2 THOU/UL (ref 1.4–8.2)
NEUTS BAND NFR BLD: 4 % (ref 0–8)
PCO2 BLD: 51.2 MMHG (ref 35–45)
PLATELET # BLD: 385 THOU/UL (ref 150–400)
PO2 BLD: 177 MMHG (ref 80–100)
POTASSIUM SERPL-SCNC: 5.8 MMOL/L (ref 3.5–5.1)
PROT SERPL-MCNC: 6.1 G/DL (ref 6.4–8.2)
RBC # BLD AUTO: 3.22 MIL/UL (ref 4.5–6)
SODIUM SERPL-SCNC: 144 MMOL/L (ref 136–145)
TROPONIN I SERPL-MCNC: 0.33 NG/ML (ref ?–0.06)
WBC # BLD AUTO: 22 THOU/UL (ref 4–11)

## 2020-07-14 NOTE — EMS
[facility]
[address] [place]   [address]                     EMS Patient Care Report       
_______________________________________________________________________________
 
Name:       EBER AVERY                     Room #:                     REG KATE HINTON#:      [id number]                       Account #:      [id number]  
Admission:  20    Attend Phys:                          
Discharge:              Date of Birth:  [birth year]  
                                                          Report #: [id number]
                                                                    [id number]
_______________________________________________________________________________
THIS REPORT FOR:   //name//                          
 
Report Transmitted: 2020 16:50
EMS Care Summary
[facility] MED-ACT
Incident [id number] @ 2020 14:13
 
Incident Location
[address] [place] [address]
 
Patient
EBER AVERY
Male, 74 Years
 1945
 
Patient Address
[address] [place] [address]
 
Patient History
Congestive Heart Failure (CHF),Hypertension (HTN),Cardiac - Stent,
 
Patient Allergies
No known allergies,
 
 
Chief Complaint
Cardiac arrest
 
Disposition
Transported [facility] [place]
 
Dispatch Reason
Cardiac Arrest/Death
 
Transported To
[facility]
 
Narrative
 responded to above address for a C1C non-breather. On scene, the crew 
finds an unresponsive 74/M patient supine on a driveway near a car. Family on 
scene state that patient just returned from having cataract surgery and became 
unresponsive in the driveway of their home. They attempted CPR in the car until 
Amy SMITH arrived, extracted patient from the car, and started compressions 
with pt supine on the driveway. On EMS arrival, pt is found supine on a hot 
 
 
 
[facility]
[address] [place]   [address]                     EMS Patient Care Report       
_______________________________________________________________________________
 
Name:       EBER AVERY                     Room #:                     REG   
JAMAAL#:      [id number]                       Account #:      [id number]  
Admission:  20    Attend Phys:                          
Discharge:              Date of Birth:  [birth year]  
                                                          Report #: [id number]
                                                                    [id number]
_______________________________________________________________________________
driveway in the sun. Pt lifted and secured to cot and moved to a shady area to 
continue running the code. Amy  has already established an airway with a 
size 4 igel and applied EtCO2. Pt ventilated with BVM with 15 lpm O2. Amy 
 states the [facility] has advised "no shock" twice. Pt's rhythm is initially 
asystole. IV attempted as noted. IO established in pt's L proximal tibia. 1 mg 
1:10,000 epi pushed IO. 500 cc NS bag hung and pressure infused. After approx 4 
minutes a pulse is felt, although quickly lost. Compressions restarted and 
another 1 mg 1:10,000 epi pushed IO. After 2 minutes of compressions, a strong 
pulse is present and pt re-triaged critical. Pt is breathing spontaneously 
although remains unresponsive. VS, ECG, 12-lead, pt moved to On license of [facility] via cot and 
secured on FABRIZIO back board. VS repeated as noted. Pt closely monitored en 
route to [facility]. On arrival, pt moved via cot to  8 and report 
delivered to the team including physician. 
 
Initial Vitals
@14:39P: 98,R: 14,BP: 172/70,Pain: 0/10,GCS: 3,Glucose: 320,EtCO2: 76,SpO2: 
92,Revised Trauma: 8, 
@14:45P: 68,R: 16,BP: 133/60,Pain: 0/10,GCS: 3,EtCO2: 57,SpO2: 95,Revised 
Trauma: 8, 
@14:40P: 61,R: 15,Pain: 0/10,GCS: 3,EtCO2: 74,SpO2: 95,MI Suspected: false
@14:22P: 0,R: 14,Pain: 0/10,GCS: 3,Revised Trauma: 4,
 
Assessments
@14:23MENTAL:Unresponsive,SKIN:Pale,HEENT:Neck/Airway: No Abnormalities,LUNG 
SOUNDS:ABDOMEN:PELVIS//GI:EXTREMITIES:PULSE:NEURO: 
 
Impression
Cardiac arrest
 
Procedures
@14:4012-Lead ECGResponse: UnchangedSucceeded@14:30Epinephrine 1:10 - 1 
Milligrams (mg) - Intraosseous (IO)Response: Unchanged@PTAResponse: 
UnchangedSucceeded@14:36Epinephrine 1:10 - 1 Milligrams (mg) - Intravenous 
(IV)Response: Improved@14:28Saline Lock 0cc (20 ga) Site: 
Antecubital-LeftResponse: UnchangedFailed@14:29Normal Saline (.9% NaCl) 500cc 
(EZ-IO (Blue 25mm)) Site: IO-Tibia-Left ProximalResponse: 
UnchangedSucceeded@PTAiGEL   Complications: None,Response: 
ImprovedSucceeded@PTAGeneral CommentsResponse: Unchanged 
 
Timeline
PTA,Response: UnchangedSucceeded,
PTA,iGEL   Complications: None,,Response: ImprovedSucceeded,
PTA,General Comments,Response: Unchanged
14:11,Call Received
14:11,Psap Call
14:13,Dispatched
 
 
 
[address]   [address]                     EMS Patient Care Report       
_______________________________________________________________________________
 
Name:       EBER AVERY                     Room #:                     REG ER  
M.R.#:      [id number]                       Account #:      [id number]  
Admission:  07/14/20    Attend Phys:                          
Discharge:              Date of Birth:  [birth year]  
                                                          Report #: [id number]
                                                                    [id number]
_______________________________________________________________________________
14:13,En Route
14:21,On Scene
14:22,At Patient
14:22,BP: 0/ M,PULSE: 0,RR: 14 R,SPO2:  Ox,ETCO2:  ,BG: ,PAIN: 0,GCS: 3,
14:28,Saline Lock 0cc 20 ga Site: Antecubital-Left,Response: UnchangedFailed,
14:29,Normal Saline (.9% NaCl) 500cc EZ-IO (Blue 25mm) Site: IO-Tibia-Left 
Proximal,Response: UnchangedSucceeded, 
14:30,Epinephrine 1:10 - 1 Milligrams (mg) - Intraosseous (IO),Response: 
Unchanged 
14:36,Epinephrine 1:10 - 1 Milligrams (mg) - Intravenous (IV),Response: Improved
14:39,BP: 172/70 M,PULSE: 98,RR: 14 R,SPO2: 92 Ox,ETCO2: 76 ,BG: 320,PAIN: 
0,GCS: 3, 
14:40,12-Lead ECG,Response: UnchangedSucceeded,
14:40,BP: / M,PULSE: 61,RR: 15 R,SPO2: 95 Ox,ETCO2: 74 ,BG: ,PAIN: 0,GCS: 3,
14:44,Depart Scene
14:45,BP: 133/60 M,PULSE: 68,RR: 16 R,SPO2: 95 Ox,ETCO2: 57 ,BG: ,PAIN: 0,GCS: 
3, 
14:48,At Destination
15:45,Call Closed
 
Disclaimer
v1.1     Copyright 2020 [employer]
This EMS Care Summary contains data elements from the applicable legal record 
(which may be displayed differently). It is designed to provide pertinent 
information for the following purposes: continuity of care, clinical quality, 
and state data reporting. The complete legal record is available to ED staff 
and administrators of the receiving hospital in ES's Patient Tracker. All data 
is provided "as is."

## 2020-07-14 NOTE — EKG
[facility]
[place]   [address]                     ELECTROCARDIOGRAM REPORT      
_______________________________________________________________________________
 
Name:       EBER AVERY                     Room #:                     REG [facility].#:      [id number]                       Account #:      [id number]  
Admission:  20    Attend Phys:                          
Discharge:              Date of Birth:  [birth year]  
                                                          Report #: [id number]
                                                                    [id number]
_______________________________________________________________________________
THIS REPORT FOR:  
 
cc:  Yvon Vazquez MD, Neal A. MD Lundgren,Trevor MATTA MD [facility]
THIS REPORT FOR:   //name//                          
 
                         [facility] ED
                                       
Test Date:    2020               Test Time:    15:01:58
Pat Name:     EBER AVERY                Department:   
Patient ID:   [id number]            Room:          
Gender:                               Technician:   [facility]
:          1945               Requested By: Mike Thomas
Order Number: [id number] MD:   Trevor Hendrix
                                 Measurements
Intervals                              Axis          
Rate:         44                       P:            -46
DE:           86                       QRS:          -91
QRSD:         143                      T:            -54
QT:           490                                    
QTc:          420                                    
                           Interpretive Statements
Probable complete heart block
RBBB and LAFB
Compared to ECG 2020 10:00:39
Sinus tachycardia no longer present
Electronically Signed On 2020 17:08:47 CDT by Trevor Hendrix
[email or web address]
 
 
 
 
 
 
 
 
 
 
 
 
 
 
 
  <ELECTRONICALLY SIGNED>
   By: Trevor Hendrix MD, [facility]   
  20     1708
D: 20 1501                           _____________________________________
T: 20 1501                           Trevor Hendrix MD, [facility]     /EPI

## 2020-07-14 NOTE — HPC
[facility]
[address]
[place]   [address]                     PAIN MANAGEMENT CONSULTATION  
_______________________________________________________________________________
 
Name:       EBER AVERY                     Room #:                     REG HERLINDA 
JAMAAL#:      [id number]                       Account #:      [id number]  
Admission:  07/13/20    Attend Phys:    Nancy Brown     
Discharge:              Date of Birth:  [birth year]  
                                                          Report #: [id number]
                                                                    [id number]   
_______________________________________________________________________________
THIS REPORT FOR:  
 
cc:  Yvon Vazquez MD, Neal A. MD Hocker,Nancy ROME                                            ~
CC: 
    Albert Randolph MD
 
DATE OF SERVICE:  07/13/2020
 
 
This is a telemedicine appointment.  The patient has agreed on via the
telephone, speaking with him from 9:30-9:50 for his medication refills.
 
CHIEF COMPLAINT:  Low back pain with spondylosis and osteoarthritis.
 
HISTORY OF PRESENT ILLNESS:  This is a pleasant 74-year-old gentleman who I am
speaking with via the telephone today for a telemedicine appointment.  He does
have significant respiratory problems and is at home during this COVID outbreak.
 Today, he is complaining of pain of a 6/10 in his right wrist and
shoulder as well as ongoing hand pain due to his osteoarthritis.  He states the
weather changes have been increasing his arthritis pain quite significantly.  He
also reports that he has been having more difficulty breathing due to the
humidity.  They did recently buy a dehumidifier and he is hopeful that will help
with some of his breathing problems.  He states that his pain medicine does not
seem to be lasting as long as it used to, typically about 5 hours a day as
opposed to 6-7 as it used to.  He is wondering if there is a possibility of
increasing his pain medication, either in strength or in number of pills
provided per day.
 
The patient denies any problems with constipation.  He does sleep off and on
throughout the day only for a few hours at a time.  He does not associate this
with daytime sleepiness as a result of medication.  He states that he sleeps in
his recliner and that is the longest that he can sleep at one time.  He does
wear oxygen 4-5 liters per nasal cannula.
 
ALLERGIES:  CODEINE, TAZOBACTAM, PIPERACILLIN, AND GABAPENTIN.
 
CURRENT LIST OF MEDICATIONS:  Metoprolol, potassium, oxycodone, Protonix,
ProAir, Lipitor, iron, aspirin, Plavix, sertraline, Imdur, Demadex, Voltaren gel
and Singulair.
 
PQRS:
1.  He has osteoarthritis of multiple joints that is diffuse in his hands,
shoulders, knees and wrists.  Denies any rheumatoid arthritis.
2.  Height, weight and vital signs were deferred due to a telemedicine
 
 
 
[facility]
[address] [place]   [address]                     PAIN MANAGEMENT CONSULTATION  
_______________________________________________________________________________
 
Name:       EBER AVEYR                     Room #:                     REG [facility]#:      [id number]                       Account #:      [id number]  
Admission:  07/13/20    Attend Phys:    Nancy Brown     
Discharge:              Date of Birth:  [birth year]  
                                                          Report #: [id number]
                                                                    [id number]   
_______________________________________________________________________________
appointment.  Pain score is 6/10.  The patient denies dizziness.  He needs
assistance with walking.  He has a walker and a chair lift.  He is on Plavix. 
He also takes medicine for hypertension.  Opioid therapy is greater than 6
weeks; therefore, an opioid signed contract is on the chart.  Risk assessment is
moderate.  Functional assessment is 67/70.
3.  Recreational drug use, he denies.  He is a former smoker and does not drink
alcohol.
 
According to the prescription monitoring system, he is filling appropriately.
 
PHYSICAL EXAMINATION:  This is review of systems due to a telemed appointment. 
He is answering all questions appropriately.  He is alert and orientated.  He is
a good historian.  The patient reports increased pain in his right wrist and
shoulder.  Does have tenderness in his knees and ankles as well and does not
ambulate unless he uses a walker or a chair lift.
 
IMPRESSION:
1.  Severe chronic obstructive pulmonary disease with supplemental oxygen.
2.  Low back pain with spondylosis and spinal stenosis.
3.  Osteoarthritis involving multiple joints, shoulders, wrists, ankles, knees.
4.  Osteoporosis.
5.  Anticoagulation therapy.
6.  Management of high risk medications under terms of written opioid agreement.
 
We reviewed the fact that opiate medications are being used to provide analgesia
adequate to support activities of daily living, not attempting to achieve a
specific pain score on the 0-10 Visual Analog Scale.  The current opiate
medications are providing sufficient analgesia to allow the patient to
participate in activities of daily living.  The patient is not exhibiting any
aberrant behavior suggestive of drug diversion.  The patient is not having any
adverse reactions to medications.  The patient is not suffering from daytime
somnolence or mental acuity changes.  The patient is managing opiate-induced
constipation with appropriate over-the-counter agents and dietary
considerations.  The patient was counseled on concern for caution with operating
a motor vehicle while using opiate medications.
 
PLAN:
1.  We discussed treatment options with the patient today.  The patient feels
that he is having either end of dose failure of his medications or is requiring
an increase in the amount of medication he does take per day.  In the past, the
patient had been on OxyContin as well as a breakthrough pain medicine.  We have
been trying to keep him at the lowest most effective dose due to his breathing
issues.  I discussed this case with Dr. Albert Randolph who is agreeable to
increase his oxycodone slightly to 7.5 mg/325 continuing at 4 times a day.  This
will be sent electronically by Dr. Albert Randolph to his pharmacy for today and
4 weeks supply.
 
 
 
[address]   [address]                     PAIN MANAGEMENT CONSULTATION  
_______________________________________________________________________________
 
Name:       EBER AVERY                     Room #:                     REG CLI 
JAMAAL#:      [id number]                       Account #:      [id number]  
Admission:  07/13/20    Attend Phys:    Nancy rBown     
Discharge:              Date of Birth:  [birth year]  
                                                          Report #: [id number]
                                                                    [id number]   
_______________________________________________________________________________
2.  The patient states that the Voltaren gel is beneficial on his arthritic
joints.  His wife will have [facility], his mail of pharmacy, send a refill
request that we will fill for an additional 6 months.
3.  The patient reports he is having eye surgery this week to help with his
cataracts and then another one in 2 weeks.  Based on this, I feel like in 2
months the patient should be able to come for an appointment to our clinic since
we have not seen him in the clinic since January.  Hopefully, the COVID outbreak
will have lessened by this time period as well.
4.  The patient is seen today in collaboration with Dr. Albert Randolph for this
telemed appointment.
 
 
 
 
 
 
 
 
 
 
 
 
 
 
 
 
 
 
 
 
 
 
 
 
 
 
 
 
 
 
 
 
 
 
  <ELECTRONICALLY SIGNED>
   By: Nancy Brown             
  07/14/20     0744
D: 07/13/20 1048                           _____________________________________
T: 07/13/20 1104                           Nancy Brown               /nt